# Patient Record
Sex: FEMALE | Race: WHITE | URBAN - METROPOLITAN AREA
[De-identification: names, ages, dates, MRNs, and addresses within clinical notes are randomized per-mention and may not be internally consistent; named-entity substitution may affect disease eponyms.]

---

## 2017-01-12 ENCOUNTER — TELEPHONE (OUTPATIENT)
Dept: TRANSPLANT | Facility: CLINIC | Age: 42
End: 2017-01-12

## 2017-01-12 NOTE — TELEPHONE ENCOUNTER
Rowena Melnedez,  received the labs from [facility] on Rosibel Willingham   1975 MR # [id number] I sent these results to epic scanning. Rosibel is active on the kidney list. Are these labs ok?  Anything more need to be done, please let me know. Fatoumata

## 2017-01-12 NOTE — TELEPHONE ENCOUNTER
talked with [facility] copy service staff, they gave new fax number and cover/DELMAR went through successful tone at 8:02 this am for records on Rosibel Fernando please look for records from [facility] by 10 am today 1/12/2017

## 2017-01-13 ENCOUNTER — TELEPHONE (OUTPATIENT)
Dept: TRANSPLANT | Facility: CLINIC | Age: 42
End: 2017-01-13

## 2017-01-13 NOTE — TELEPHONE ENCOUNTER
"Labs from [facility] Arrived, faxed to CAMILLA Banegas and sent to [facility].  Nora to review  ESR,  CRP, Vasculitis panel,  RPR, Myeloperoxidase Antibody IgG, Proteinase 3 antibody IgG,  Beta 2 glycoprotein 1 Antibody IgM,  Beta 2 glycoprotein 1 Antibody IgG,  Rheumatoid factor,  LUPE antibody panel,  PRABHAKAR (direct antiglobulin test) to see if anything more needs to be done.  I called Rosibel and informed her that she is ACTIVE on Wait List.  If she has questions she can call Rec[facility] Wait List team in the future. She understands.      Rosibel commented that her donors have said it is a 'slow process and haven't heard from donor team.\"  I recommend she ask her donors to call, Rosibel has number and she will tell them.  I will send Rosibel's donor comment to donor team.  Zunilda Borrego.  savanna Sheridan Pt active on list, Nora may have recommendations for outside labs.    "

## 2017-01-19 ENCOUNTER — RESULTS ONLY (OUTPATIENT)
Dept: OTHER | Facility: CLINIC | Age: 42
End: 2017-01-19

## 2017-01-19 ENCOUNTER — DOCUMENTATION ONLY (OUTPATIENT)
Dept: NEPHROLOGY | Facility: CLINIC | Age: 42
End: 2017-01-19

## 2017-01-19 DIAGNOSIS — Z99.2 END STAGE RENAL DISEASE ON DIALYSIS (H): ICD-10-CM

## 2017-01-19 DIAGNOSIS — N18.6 END STAGE RENAL DISEASE ON DIALYSIS (H): ICD-10-CM

## 2017-01-19 PROCEDURE — 86832 HLA CLASS I HIGH DEFIN QUAL: CPT | Performed by: TRANSPLANT SURGERY

## 2017-01-19 PROCEDURE — 86833 HLA CLASS II HIGH DEFIN QUAL: CPT | Performed by: TRANSPLANT SURGERY

## 2017-01-19 NOTE — PROGRESS NOTES
The following outside labs were obtained and reviewed by myself, along with Dr. Valente, in response to a positive dsDNA result at time of eval : LUPE, PRABHAKAR, RPR, lupus panel, beta 2 glycoprotein (IgG, IgM), RF, ESR,CRP, MPO, PR3, and vasculitis panel. All results were negative, except RNP, which had previously been positive. None of the above should preclude her from active listing on the kidney transplant list.

## 2017-01-24 LAB — PRA SINGLE ANTIGEN IGG ANTIBODY: NORMAL

## 2017-02-01 LAB
SA1 CELL: NORMAL
SA1 COMMENTS: NORMAL
SA1 HI RISK ABY: NORMAL
SA1 MOD RISK ABY: NORMAL
SA1 TEST METHOD: NORMAL
SA2 CELL: NORMAL
SA2 COMMENTS: NORMAL
SA2 HI RISK ABY UA: NORMAL
SA2 MOD RISK ABY: NORMAL
SA2 TEST METHOD: NORMAL

## 2017-03-08 ENCOUNTER — RESULTS ONLY (OUTPATIENT)
Dept: OTHER | Facility: CLINIC | Age: 42
End: 2017-03-08

## 2017-03-13 LAB — CROSSMATCH RESULT: NORMAL

## 2017-04-20 DIAGNOSIS — Z99.2 END STAGE RENAL DISEASE ON DIALYSIS (H): ICD-10-CM

## 2017-04-20 DIAGNOSIS — N18.6 END STAGE RENAL DISEASE ON DIALYSIS (H): ICD-10-CM

## 2017-04-21 ENCOUNTER — DOCUMENTATION ONLY (OUTPATIENT)
Dept: TRANSPLANT | Facility: CLINIC | Age: 42
End: 2017-04-21

## 2017-04-21 ENCOUNTER — ORGAN (OUTPATIENT)
Dept: TRANSPLANT | Facility: CLINIC | Age: 42
End: 2017-04-21

## 2017-04-21 DIAGNOSIS — Z76.82 AWAITING ORGAN TRANSPLANT: Primary | ICD-10-CM

## 2017-04-21 NOTE — Clinical Note
Final XM negative. Kidney arriving Sunday morning and will be transplanted in recip upon final visualization.

## 2017-04-21 NOTE — PROGRESS NOTES
PATHOLOGY HLA CROSSMATCH CONSULTATION: DONOR/RECIPIENT  VIRTUAL CROSSMATCH - Kidney  Consultation Date: 2017  Consultation Requested by: Dr. Mani Whiteside  Regarding: Compatibility of  donor organ UNOS #[id number] from OPO/Hospital: [facility]  with Rosibel Willingham      Findings: Regarding a virtual crossmatch between Rosibel Willingham and  donor listed above (match ID [id number]):  The most recent (17) and two (2) additional patient serum/sera  were analyzed.  The patient has no antibodies listed with HLA specificity against the donor organ. This candidate has no HLA mismatches in the graft rejection vector with the kidney donor; therefore, there are no donor-specific antibodies by definition.     Record Review Indicates: I personally reviewed the most recent serum, the historic peak sera, and all other sera with solid-phase HLA Single Antigen test results:  The patient has no HLA antibodies against the donor organ.     The results of this virtual XM are:   -most recent serum: compatible   -peak #1: compatible    Disclaimer: Clinical judgement must take into account other factors, such as non-HLA antibodies not detected in the assay. The VXM gives probabilities only.  The probability does not account for the potential for auto-antibodies that may be present in the patient's serum.  These autoantibodies may render the physical crossmatch falsely positive, and would be detected by an autologous crossmatch.  When possible, confirm findings with prospective allogeneic and autologous flow crossmatches before going to transplant as clinically indicated.     Koby Holman, PhD,[facility]  Medical Director, Immunology/Histocompatibility Laboratory  Pager [phone]

## 2017-04-22 ENCOUNTER — RESULTS ONLY (OUTPATIENT)
Dept: OTHER | Facility: CLINIC | Age: 42
End: 2017-04-22

## 2017-04-22 ENCOUNTER — APPOINTMENT (OUTPATIENT)
Dept: GENERAL RADIOLOGY | Facility: CLINIC | Age: 42
DRG: 652 | End: 2017-04-22
Attending: SURGERY
Payer: COMMERCIAL

## 2017-04-22 ENCOUNTER — HOSPITAL ENCOUNTER (INPATIENT)
Facility: CLINIC | Age: 42
LOS: 4 days | Discharge: HOME HEALTH | DRG: 652 | End: 2017-04-27
Attending: SURGERY | Admitting: SURGERY
Payer: COMMERCIAL

## 2017-04-22 ENCOUNTER — ANESTHESIA EVENT (OUTPATIENT)
Dept: SURGERY | Facility: CLINIC | Age: 42
DRG: 652 | End: 2017-04-22
Payer: COMMERCIAL

## 2017-04-22 DIAGNOSIS — Z99.2 ESRD (END STAGE RENAL DISEASE) ON DIALYSIS (H): ICD-10-CM

## 2017-04-22 DIAGNOSIS — N18.6 END STAGE RENAL DISEASE (H): ICD-10-CM

## 2017-04-22 DIAGNOSIS — Z76.82 KIDNEY TRANSPLANT CANDIDATE: Primary | ICD-10-CM

## 2017-04-22 DIAGNOSIS — Z94.0 KIDNEY REPLACED BY TRANSPLANT: ICD-10-CM

## 2017-04-22 DIAGNOSIS — T86.19 OTHER COMPLICATION OF KIDNEY TRANSPLANT: ICD-10-CM

## 2017-04-22 DIAGNOSIS — D84.9 IMMUNOSUPPRESSION (H): ICD-10-CM

## 2017-04-22 DIAGNOSIS — N18.6 ESRD (END STAGE RENAL DISEASE) ON DIALYSIS (H): ICD-10-CM

## 2017-04-22 LAB
ABO + RH BLD: NORMAL
ABO + RH BLD: NORMAL
ALBUMIN SERPL-MCNC: 4.8 G/DL (ref 3.4–5)
ALBUMIN UR-MCNC: 100 MG/DL
ALP SERPL-CCNC: 56 U/L (ref 40–150)
ALT SERPL W P-5'-P-CCNC: 32 U/L (ref 0–50)
ANION GAP SERPL CALCULATED.3IONS-SCNC: 12 MMOL/L (ref 3–14)
APPEARANCE UR: CLEAR
APTT PPP: 29 SEC (ref 22–37)
AST SERPL W P-5'-P-CCNC: 16 U/L (ref 0–45)
BASOPHILS # BLD AUTO: 0 10E9/L (ref 0–0.2)
BASOPHILS NFR BLD AUTO: 0.5 %
BILIRUB SERPL-MCNC: 0.5 MG/DL (ref 0.2–1.3)
BILIRUB UR QL STRIP: NEGATIVE
BLD GP AB SCN SERPL QL: NORMAL
BLD PROD TYP BPU: NORMAL
BLOOD BANK CMNT PATIENT-IMP: NORMAL
BUN SERPL-MCNC: 61 MG/DL (ref 7–30)
CALCIUM SERPL-MCNC: 10.7 MG/DL (ref 8.5–10.1)
CHLORIDE SERPL-SCNC: 98 MMOL/L (ref 94–109)
CHOLEST SERPL-MCNC: 199 MG/DL
CO2 SERPL-SCNC: 27 MMOL/L (ref 20–32)
COLOR UR AUTO: ABNORMAL
CREAT SERPL-MCNC: 8.57 MG/DL (ref 0.52–1.04)
DIFFERENTIAL METHOD BLD: ABNORMAL
EOSINOPHIL # BLD AUTO: 0.2 10E9/L (ref 0–0.7)
EOSINOPHIL NFR BLD AUTO: 1.9 %
ERYTHROCYTE [DISTWIDTH] IN BLOOD BY AUTOMATED COUNT: 16.3 % (ref 10–15)
GFR SERPL CREATININE-BSD FRML MDRD: 5 ML/MIN/1.7M2
GLUCOSE BLDC GLUCOMTR-MCNC: 112 MG/DL (ref 70–99)
GLUCOSE SERPL-MCNC: 78 MG/DL (ref 70–99)
GLUCOSE UR STRIP-MCNC: 30 MG/DL
HBA1C MFR BLD: 4 % (ref 4.3–6)
HCG SERPL QL: NEGATIVE
HCT VFR BLD AUTO: 35.7 % (ref 35–47)
HDLC SERPL-MCNC: 81 MG/DL
HGB BLD-MCNC: 11.3 G/DL (ref 11.7–15.7)
HGB UR QL STRIP: NEGATIVE
IMM GRANULOCYTES # BLD: 0 10E9/L (ref 0–0.4)
IMM GRANULOCYTES NFR BLD: 0.1 %
INR PPP: 1.04 (ref 0.86–1.14)
KETONES UR STRIP-MCNC: NEGATIVE MG/DL
LDLC SERPL CALC-MCNC: 100 MG/DL
LEUKOCYTE ESTERASE UR QL STRIP: NEGATIVE
LYMPHOCYTES # BLD AUTO: 1.8 10E9/L (ref 0.8–5.3)
LYMPHOCYTES NFR BLD AUTO: 22.4 %
MCH RBC QN AUTO: 29 PG (ref 26.5–33)
MCHC RBC AUTO-ENTMCNC: 31.7 G/DL (ref 31.5–36.5)
MCV RBC AUTO: 92 FL (ref 78–100)
MONOCYTES # BLD AUTO: 0.5 10E9/L (ref 0–1.3)
MONOCYTES NFR BLD AUTO: 5.9 %
MUCOUS THREADS #/AREA URNS LPF: PRESENT /LPF
NEUTROPHILS # BLD AUTO: 5.5 10E9/L (ref 1.6–8.3)
NEUTROPHILS NFR BLD AUTO: 69.2 %
NITRATE UR QL: NEGATIVE
NONHDLC SERPL-MCNC: 118 MG/DL
NRBC # BLD AUTO: 0 10*3/UL
NRBC BLD AUTO-RTO: 0 /100
NUM BPU REQUESTED: 2
PH UR STRIP: 8 PH (ref 5–7)
PLATELET # BLD AUTO: 150 10E9/L (ref 150–450)
POTASSIUM SERPL-SCNC: 4.1 MMOL/L (ref 3.4–5.3)
PROT SERPL-MCNC: 8.6 G/DL (ref 6.8–8.8)
PROT UR-MCNC: 1.38 G/L
PROT/CREAT 24H UR: 5.96 G/G CR (ref 0–0.2)
RBC # BLD AUTO: 3.89 10E12/L (ref 3.8–5.2)
RBC #/AREA URNS AUTO: 1 /HPF (ref 0–2)
SODIUM SERPL-SCNC: 138 MMOL/L (ref 133–144)
SP GR UR STRIP: 1 (ref 1–1.03)
SPECIMEN EXP DATE BLD: NORMAL
SQUAMOUS #/AREA URNS AUTO: 1 /HPF (ref 0–1)
TRIGL SERPL-MCNC: 92 MG/DL
URN SPEC COLLECT METH UR: ABNORMAL
UROBILINOGEN UR STRIP-MCNC: NORMAL MG/DL (ref 0–2)
WBC # BLD AUTO: 8 10E9/L (ref 4–11)
WBC #/AREA URNS AUTO: <1 /HPF (ref 0–2)

## 2017-04-22 PROCEDURE — 36415 COLL VENOUS BLD VENIPUNCTURE: CPT | Performed by: STUDENT IN AN ORGANIZED HEALTH CARE EDUCATION/TRAINING PROGRAM

## 2017-04-22 PROCEDURE — 86833 HLA CLASS II HIGH DEFIN QUAL: CPT | Performed by: TRANSPLANT SURGERY

## 2017-04-22 PROCEDURE — 25000132 ZZH RX MED GY IP 250 OP 250 PS 637: Performed by: STUDENT IN AN ORGANIZED HEALTH CARE EDUCATION/TRAINING PROGRAM

## 2017-04-22 PROCEDURE — 86803 HEPATITIS C AB TEST: CPT | Performed by: STUDENT IN AN ORGANIZED HEALTH CARE EDUCATION/TRAINING PROGRAM

## 2017-04-22 PROCEDURE — 86825 HLA X-MATH NON-CYTOTOXIC: CPT | Performed by: TRANSPLANT SURGERY

## 2017-04-22 PROCEDURE — 86901 BLOOD TYPING SEROLOGIC RH(D): CPT | Performed by: STUDENT IN AN ORGANIZED HEALTH CARE EDUCATION/TRAINING PROGRAM

## 2017-04-22 PROCEDURE — 90937 HEMODIALYSIS REPEATED EVAL: CPT

## 2017-04-22 PROCEDURE — 86705 HEP B CORE ANTIBODY IGM: CPT | Performed by: STUDENT IN AN ORGANIZED HEALTH CARE EDUCATION/TRAINING PROGRAM

## 2017-04-22 PROCEDURE — 80053 COMPREHEN METABOLIC PANEL: CPT | Performed by: STUDENT IN AN ORGANIZED HEALTH CARE EDUCATION/TRAINING PROGRAM

## 2017-04-22 PROCEDURE — 80061 LIPID PANEL: CPT | Performed by: STUDENT IN AN ORGANIZED HEALTH CARE EDUCATION/TRAINING PROGRAM

## 2017-04-22 PROCEDURE — 40000141 ZZH STATISTIC PERIPHERAL IV START W/O US GUIDANCE

## 2017-04-22 PROCEDURE — 81001 URINALYSIS AUTO W/SCOPE: CPT | Performed by: STUDENT IN AN ORGANIZED HEALTH CARE EDUCATION/TRAINING PROGRAM

## 2017-04-22 PROCEDURE — 86665 EPSTEIN-BARR CAPSID VCA: CPT | Performed by: STUDENT IN AN ORGANIZED HEALTH CARE EDUCATION/TRAINING PROGRAM

## 2017-04-22 PROCEDURE — 93005 ELECTROCARDIOGRAM TRACING: CPT

## 2017-04-22 PROCEDURE — 86900 BLOOD TYPING SEROLOGIC ABO: CPT | Performed by: STUDENT IN AN ORGANIZED HEALTH CARE EDUCATION/TRAINING PROGRAM

## 2017-04-22 PROCEDURE — 86850 RBC ANTIBODY SCREEN: CPT | Performed by: STUDENT IN AN ORGANIZED HEALTH CARE EDUCATION/TRAINING PROGRAM

## 2017-04-22 PROCEDURE — 86923 COMPATIBILITY TEST ELECTRIC: CPT | Performed by: STUDENT IN AN ORGANIZED HEALTH CARE EDUCATION/TRAINING PROGRAM

## 2017-04-22 PROCEDURE — 84156 ASSAY OF PROTEIN URINE: CPT | Performed by: STUDENT IN AN ORGANIZED HEALTH CARE EDUCATION/TRAINING PROGRAM

## 2017-04-22 PROCEDURE — 83036 HEMOGLOBIN GLYCOSYLATED A1C: CPT | Performed by: STUDENT IN AN ORGANIZED HEALTH CARE EDUCATION/TRAINING PROGRAM

## 2017-04-22 PROCEDURE — 85025 COMPLETE CBC W/AUTO DIFF WBC: CPT | Performed by: STUDENT IN AN ORGANIZED HEALTH CARE EDUCATION/TRAINING PROGRAM

## 2017-04-22 PROCEDURE — 86645 CMV ANTIBODY IGM: CPT | Performed by: STUDENT IN AN ORGANIZED HEALTH CARE EDUCATION/TRAINING PROGRAM

## 2017-04-22 PROCEDURE — 85730 THROMBOPLASTIN TIME PARTIAL: CPT | Performed by: STUDENT IN AN ORGANIZED HEALTH CARE EDUCATION/TRAINING PROGRAM

## 2017-04-22 PROCEDURE — 84703 CHORIONIC GONADOTROPIN ASSAY: CPT | Performed by: STUDENT IN AN ORGANIZED HEALTH CARE EDUCATION/TRAINING PROGRAM

## 2017-04-22 PROCEDURE — 74000 XR ABDOMEN PORT F1 VW: CPT

## 2017-04-22 PROCEDURE — 87340 HEPATITIS B SURFACE AG IA: CPT | Performed by: STUDENT IN AN ORGANIZED HEALTH CARE EDUCATION/TRAINING PROGRAM

## 2017-04-22 PROCEDURE — 86832 HLA CLASS I HIGH DEFIN QUAL: CPT | Performed by: TRANSPLANT SURGERY

## 2017-04-22 PROCEDURE — 71020 XR CHEST 2 VW: CPT

## 2017-04-22 PROCEDURE — 86644 CMV ANTIBODY: CPT | Performed by: STUDENT IN AN ORGANIZED HEALTH CARE EDUCATION/TRAINING PROGRAM

## 2017-04-22 PROCEDURE — 93010 ELECTROCARDIOGRAM REPORT: CPT | Performed by: INTERNAL MEDICINE

## 2017-04-22 PROCEDURE — 25000128 H RX IP 250 OP 636

## 2017-04-22 PROCEDURE — 87389 HIV-1 AG W/HIV-1&-2 AB AG IA: CPT | Performed by: STUDENT IN AN ORGANIZED HEALTH CARE EDUCATION/TRAINING PROGRAM

## 2017-04-22 PROCEDURE — 85610 PROTHROMBIN TIME: CPT | Performed by: STUDENT IN AN ORGANIZED HEALTH CARE EDUCATION/TRAINING PROGRAM

## 2017-04-22 PROCEDURE — 25000128 H RX IP 250 OP 636: Performed by: INTERNAL MEDICINE

## 2017-04-22 PROCEDURE — 82962 GLUCOSE BLOOD TEST: CPT

## 2017-04-22 RX ORDER — HYDROMORPHONE HYDROCHLORIDE 1 MG/ML
INJECTION, SOLUTION INTRAMUSCULAR; INTRAVENOUS; SUBCUTANEOUS
Status: COMPLETED
Start: 2017-04-22 | End: 2017-04-22

## 2017-04-22 RX ORDER — HYDROMORPHONE HCL/0.9% NACL/PF 0.2MG/0.2
0.2 SYRINGE (ML) INTRAVENOUS ONCE
Status: COMPLETED | OUTPATIENT
Start: 2017-04-22 | End: 2017-04-22

## 2017-04-22 RX ORDER — METOPROLOL SUCCINATE 25 MG/1
25 TABLET, EXTENDED RELEASE ORAL DAILY
Status: DISCONTINUED | OUTPATIENT
Start: 2017-04-22 | End: 2017-04-23

## 2017-04-22 RX ORDER — HYDROMORPHONE HCL/0.9% NACL/PF 0.2MG/0.2
0.2 SYRINGE (ML) INTRAVENOUS
Status: DISCONTINUED | OUTPATIENT
Start: 2017-04-22 | End: 2017-04-24 | Stop reason: DRUGHIGH

## 2017-04-22 RX ADMIN — SODIUM CHLORIDE 250 ML: 9 INJECTION, SOLUTION INTRAVENOUS at 22:07

## 2017-04-22 RX ADMIN — HYDROMORPHONE HYDROCHLORIDE 0.2 MG: 10 INJECTION, SOLUTION INTRAMUSCULAR; INTRAVENOUS; SUBCUTANEOUS at 23:58

## 2017-04-22 RX ADMIN — Medication 0.2 MG: at 23:58

## 2017-04-22 RX ADMIN — METOPROLOL SUCCINATE 25 MG: 25 TABLET, FILM COATED, EXTENDED RELEASE ORAL at 17:11

## 2017-04-22 RX ADMIN — SODIUM CHLORIDE 1000 ML: 9 INJECTION, SOLUTION INTRAVENOUS at 22:06

## 2017-04-22 NOTE — CONSULTS
Nephrology Initial Consult  April 22, 2017      Rosibel Willingham MRN:[id number] YOB: 1975  Date of Admission:4/22/2017  Primary care provider: No Ref-Primary, Physician  Requesting physician: Leanne Montelongo MD    ASSESSMENT AND RECOMMENDATIONS:   1. ESKD secondary unclear etiology - patient last dialyzed 2 days ago, but reportedly has good urine output and less than a kg above her dry weight.  Normal serum potassium.  No acute indications for dialysis and feel she does not require dialysis prior to planned kidney transplant.  2. HTN - okay control.  Would recommend holding amlodipine and continuing on metoprolol for now.  3. Anemia in chronic renal disease - near normal Hgb.  Will check iron studies.  4. Secondary renal hyperparathyroidism - will check PTH and vitamin D levels.  5. Hypercalcemia - will check PTH and vitamin D levels.    Recommendations were communicated to primary team via this note.    Sid Blankenship MD      REASON FOR CONSULT:  ESKD, planned kidney transplant    HISTORY OF PRESENT ILLNESS:  Rosibel Willingham is a 41 year old with ESKD who presents for possible kidney transplant.  Patient grew up in the [facility], but her  is from [place] and they decided to move down there just over a year ago.  Soon after moving to [place], patient presented to a local hospital in 6/2016 with hypertensive emergency, kidney failure with a creatinine of 8.3 mg/dl, and anemia requiring blood transfusion.  Abdominal US showed increased echogenicity of both kidneys and they were smaller just under 9 cm each. YOLANDE and RNP were positive.  Complements were noted to be normal.  A kidney biopsy was done that showed global glomerulosclerosis and severe interstitial fibrosis and tubular atrophy. She was initiated on hemodialysis and has continued on it since.  Of note, for about a year prior to starting dialysis, patient had been developing worsening headaches, for which she was  taking Excedrin 4-5 times per week for 1-2 months, leg cramps, and blood in the stools, all of which have since resolved.  She presently dialyzes via a left upper extremity AV fistula on THS with last dialysis on Thursday.  Her dry weight is ~ 52.0 kg and she continues to have good urine output.    She reports feeling really good with minimal medical complaints at this time.  No fever, sweats or chills.  No nausea, vomiting or diarrhea.  No chest pain or shortness of breath.  No leg swelling.    PAST MEDICAL HISTORY:  Reviewed with patient on 04/22/2017.  Past Medical History:   Diagnosis Date     Anemia of chronic kidney failure      End stage kidney disease (H)      Hypertension      Secondary hyperparathyroidism (H)        Past Surgical History:   Procedure Laterality Date     APPENDECTOMY       CREATE FISTULA ARTERIOVENOUS UPPER EXTREMITY          MEDICATIONS:  PTA Meds  Prior to Admission medications    Medication Sig Last Dose Taking? Auth Provider   amLODIPine (NORVASC) 10 MG tablet Take 10 mg by mouth daily 4/21/2017 at 2000 Yes Reported, Patient   metoprolol (TOPROL-XL) 25 MG 24 hr tablet Take 25 mg by mouth daily 4/21/2017 at 2000 Yes Reported, Patient      Current Meds    methylPREDNISolone  500 mg Intravenous Once     anti-thymocyte globulin  2 mg/kg Intravenous Once     mycophenolate  1,000 mg Intravenous Once     cefuroxime (ZINACEF) IV  1.5 g Intravenous Once     Infusion Meds       ALLERGIES:    Allergies   Allergen Reactions     Erythromycin Hives       REVIEW OF SYSTEMS:  A 10 point review of systems was negative except as noted above.    SOCIAL HISTORY:   Social History     Social History     Marital status:      Spouse name: N/A     Number of children: N/A     Years of education: N/A     Occupational History     Not on file.     Social History Main Topics     Smoking status: Former Smoker     Packs/day: 0.50     Years: 2.00     Types: Cigarettes     Quit date: 9/26/1998     Smokeless  "tobacco: Never Used     Alcohol use 1.2 oz/week     2 Standard drinks or equivalent per week     Drug use: No     Sexual activity: Not on file     Other Topics Concern     Not on file     Social History Narrative     Reviewed with patient.    FAMILY MEDICAL HISTORY:   Family History   Problem Relation Age of Onset     Family History Negative       Reviewed with patient.    PHYSICAL EXAM:   Temp  Av.8  F (36.6  C)  Min: 97.8  F (36.6  C)  Max: 97.8  F (36.6  C)      Pulse  Av  Min: 86  Max: 100 Resp  Av  Min: 16  Max: 16       BP (!) 144/91  Pulse 86  Temp 97.8  F (36.6  C) (Oral)  Resp 16  Ht 1.575 m (5' 2\")  Wt 52.9 kg (116 lb 10 oz)  BMI 21.33 kg/m2   Date 17 0700 - 17 0659   Shift 0505-8105 5087-1318 1411-5683 24 Hour Total   I  N  T  A  K  E   P.O. 240   240    Shift Total  (mL/kg) 240  (4.54)   240  (4.54)   O  U  T  P  U  T   Shift Total  (mL/kg)       Weight (kg) 52.9 52.9 52.9 52.9        Admit Weight: 52.9 kg (116 lb 10 oz)     GENERAL APPEARANCE: alert, NAD  EYES: no scleral icterus, pupils equal  HENT: NC/AT, mouth without ulcers or lesions  Lymphatics: no cervical or supraclavicular LAD  Pulmonary: lungs clear to auscultation with equal breath sounds bilaterally  CV: regular rhythm, normal rate   - none to trace LE edema bilaterally  GI: soft, nontender, normal bowel sounds  MS: no evidence of inflammation in joints, no muscle tenderness  : no Huang  SKIN: no rash, warm, dry  NEURO: mentation intact and speech normal  ACCESS: left upper extremity AV fistula with good thrill    LABS:   CMP  Recent Labs  Lab 17  1230      POTASSIUM 4.1   CHLORIDE 98   CO2 27   ANIONGAP 12   GLC 78   BUN 61*   CR 8.57*   GFRESTIMATED 5*   GFRESTBLACK 6*   ARLINE 10.7*   PROTTOTAL 8.6   ALBUMIN 4.8   BILITOTAL 0.5   ALKPHOS 56   AST 16   ALT 32     CBC  Recent Labs  Lab 17  1230   HGB 11.3*   WBC 8.0   RBC 3.89   HCT 35.7   MCV 92   MCH 29.0   MCHC 31.7   RDW 16.3*    "     INR  Recent Labs  Lab 04/22/17  1230   INR 1.04   PTT 29     ABGNo lab results found in last 7 days.   URINE STUDIES  Recent Labs   Lab Test  04/22/17   1300  09/26/16   1539   COLOR  Light Yellow  Straw   APPEARANCE  Clear  Clear   URINEGLC  30*  50*   URINEBILI  Negative  Negative   URINEKETONE  Negative  Negative   SG  1.005  1.004   UBLD  Negative  Small*   URINEPH  8.0*  9.0*   PROTEIN  100*  30*   NITRITE  Negative  Negative   LEUKEST  Negative  Negative   RBCU  1  0   WBCU  <1  <1     No lab results found.  PTH  No lab results found.  IRON STUDIES  No lab results found.    Sid Blankenship MD

## 2017-04-22 NOTE — LETTER
Transition Communication Hand-off for Care Transitions to Next Level of Care Provider    Name: Rosibel Willingham  MRN #: [id number]  Primary Care Provider: Physician No Ref-Primary     Primary Clinic: No address on file     Reason for Hospitalization:  Kidney replaced by transplant [Z94.0]  Admit Date/Time: 4/22/2017 11:08 AM  Discharge Date: 4.27.17  Payor Source: Payor: [facility] / Plan: [employer] TRANSPLANT / Product Type: Indemnity /              Reason for Communication Hand-off Referral: Other see dx    Discharge Plan:       Concern for non-adherence with plan of care:   Y/N N  Discharge Needs Assessment:  Needs       Most Recent Value    Anticipated Changes Related to Illness none    Other Resources -- [[facility] [phone]]    Home Care [facility] [phone], Fax: [phone]            Follow-up specialty is recommended: Yes    Follow-up plan:  Future Appointments  Date Time Provider Department Center   4/28/2017 7:00 AM [facility] SPE[facility]   4/28/2017 8:00 AM Benjamin Beltran MD [facility]   4/29/2017 7:00 AM [facility]   4/30/2017 7:00 AM [facility]   5/15/2017 2:00 PM Leanne Montelongo MD [facility]   5/22/2017 1:00 PM Benjamin Beltran MD [facility]   7/24/2017 1:10 PM Benjamin Beltran MD [facility]       Any outstanding tests or procedures:        Referrals     Future Labs/Procedures    Home care nursing referral     Comments:    ___________________________________________________  [facility]  Phone: [phone]  Fax: [phone]  ___________________________________________________  ++++pt will stay at [facility] Hot+++++  [address] [place] [address]  Room__________  [place] [address]  Hotel Phone: [phone]  Rosibel Cell: [phone]  Ian Cell: [phone]        RN skilled nursing visit, to begin after [facility] clinic ([phone]) appointments are completed--approx start date 5/3 or 5/5     RN to  assess vital signs and weight, respiratory and cardiac status, patients ability to take and record daily blood pressure, temp and weight, pain level and activity tolerance, incision for signs/symptoms of infection, hydration, nutrition and bowel status and home safety.  RN to teach medication management.    RN to provide morning lab draws, Q M-W-F and report results to Outpatient Care Coordinator: Chinyere Chacha  Ph: [phone]  Fax: [phone]    Pt will have Outpatient Hemodialysis Q M-W-F @ 12:30pm (they cannot draw immunosuppression labs)    Your provider has ordered home care nursing services. If you have not been contacted within 2 days of your discharge please call the inpatient department phone number at [phone] .            [facility] Recommendations:      Swapna Medina    AVS/Discharge Summary is the source of truth; this is a helpful guide for improved communication of patient story

## 2017-04-22 NOTE — IP AVS SNAPSHOT
[address] [address]    Phone:  [phone]                                       After Visit Summary   4/22/2017    Rosibel Willingham    MRN: [id number]           After Visit Summary Signature Page     I have received my discharge instructions, and my questions have been answered. I have discussed any challenges I see with this plan with the nurse or doctor.    ..........................................................................................................................................  Patient/Patient Representative Signature      ..........................................................................................................................................  Patient Representative Print Name and Relationship to Patient    ..................................................               ................................................  Date                                            Time    ..........................................................................................................................................  Reviewed by Signature/Title    ...................................................              ..............................................  Date                                                            Time

## 2017-04-22 NOTE — PLAN OF CARE
Problem: Individualization  Goal: Patient Preferences  Outcome: No Change  Focus: Admission  D/I: Pt admitted at 1100 for pre op kidney transplant. Pt ambulated to unit with . Alert and oriented. Hypertensive, pt last took pm meds last evening. OVSS. Labs drawn, urine spec sent, EKG and CXR done. Nephrologist consulted since pt is due to dialyze today and OR time has been pushed back to late evening hours. Waiting to hear if she will need HD today. Left UE fistula + bruit, + thrill. PIV placed in right AC, saline locked. Denies pain, did have lunch at 1300, otherwise has been NPO. Scrub X 1 done.  Admission profile started, still needs abuse questions asked but  has been in room.   A/P: Plan for OR tonight. One more shower scrub needed.

## 2017-04-23 ENCOUNTER — ANESTHESIA (OUTPATIENT)
Dept: SURGERY | Facility: CLINIC | Age: 42
DRG: 652 | End: 2017-04-23
Payer: COMMERCIAL

## 2017-04-23 ENCOUNTER — DOCUMENTATION ONLY (OUTPATIENT)
Dept: TRANSPLANT | Facility: CLINIC | Age: 42
End: 2017-04-23

## 2017-04-23 ENCOUNTER — APPOINTMENT (OUTPATIENT)
Dept: GENERAL RADIOLOGY | Facility: CLINIC | Age: 42
DRG: 652 | End: 2017-04-23
Attending: TRANSPLANT SURGERY
Payer: COMMERCIAL

## 2017-04-23 PROBLEM — Z94.0 DECEASED-DONOR KIDNEY TRANSPLANT: Status: ACTIVE | Noted: 2017-04-23

## 2017-04-23 LAB
ANION GAP SERPL CALCULATED.3IONS-SCNC: 10 MMOL/L (ref 3–14)
ANION GAP SERPL CALCULATED.3IONS-SCNC: 10 MMOL/L (ref 3–14)
ANION GAP SERPL CALCULATED.3IONS-SCNC: 8 MMOL/L (ref 3–14)
BLD PROD TYP BPU: NORMAL
BLD PROD TYP BPU: NORMAL
BLD UNIT ID BPU: 0
BLD UNIT ID BPU: 0
BLOOD PRODUCT CODE: NORMAL
BLOOD PRODUCT CODE: NORMAL
BPU ID: NORMAL
BPU ID: NORMAL
BUN SERPL-MCNC: 38 MG/DL (ref 7–30)
BUN SERPL-MCNC: 42 MG/DL (ref 7–30)
BUN SERPL-MCNC: 43 MG/DL (ref 7–30)
CALCIUM SERPL-MCNC: 7.9 MG/DL (ref 8.5–10.1)
CALCIUM SERPL-MCNC: 8.3 MG/DL (ref 8.5–10.1)
CALCIUM SERPL-MCNC: 8.5 MG/DL (ref 8.5–10.1)
CHLORIDE SERPL-SCNC: 105 MMOL/L (ref 94–109)
CHLORIDE SERPL-SCNC: 105 MMOL/L (ref 94–109)
CHLORIDE SERPL-SCNC: 106 MMOL/L (ref 94–109)
CO2 SERPL-SCNC: 22 MMOL/L (ref 20–32)
CO2 SERPL-SCNC: 23 MMOL/L (ref 20–32)
CO2 SERPL-SCNC: 26 MMOL/L (ref 20–32)
CREAT SERPL-MCNC: 5.02 MG/DL (ref 0.52–1.04)
CREAT SERPL-MCNC: 6.48 MG/DL (ref 0.52–1.04)
CREAT SERPL-MCNC: 6.52 MG/DL (ref 0.52–1.04)
DEPRECATED CALCIDIOL+CALCIFEROL SERPL-MC: 29 UG/L (ref 20–75)
ERYTHROCYTE [DISTWIDTH] IN BLOOD BY AUTOMATED COUNT: 15.9 % (ref 10–15)
ERYTHROCYTE [DISTWIDTH] IN BLOOD BY AUTOMATED COUNT: 16 % (ref 10–15)
FERRITIN SERPL-MCNC: 71 NG/ML (ref 12–150)
GFR SERPL CREATININE-BSD FRML MDRD: 7 ML/MIN/1.7M2
GFR SERPL CREATININE-BSD FRML MDRD: 7 ML/MIN/1.7M2
GFR SERPL CREATININE-BSD FRML MDRD: 9 ML/MIN/1.7M2
GLUCOSE BLDC GLUCOMTR-MCNC: 197 MG/DL (ref 70–99)
GLUCOSE BLDC GLUCOMTR-MCNC: 205 MG/DL (ref 70–99)
GLUCOSE SERPL-MCNC: 111 MG/DL (ref 70–99)
GLUCOSE SERPL-MCNC: 93 MG/DL (ref 70–99)
GLUCOSE SERPL-MCNC: 95 MG/DL (ref 70–99)
HCT VFR BLD AUTO: 26.8 % (ref 35–47)
HCT VFR BLD AUTO: 28.1 % (ref 35–47)
HGB BLD-MCNC: 7.8 G/DL (ref 11.7–15.7)
HGB BLD-MCNC: 8.4 G/DL (ref 11.7–15.7)
HGB BLD-MCNC: 8.4 G/DL (ref 11.7–15.7)
HGB BLD-MCNC: 9.2 G/DL (ref 11.7–15.7)
IRON SATN MFR SERPL: 25 % (ref 15–46)
IRON SERPL-MCNC: 47 UG/DL (ref 35–180)
MAGNESIUM SERPL-MCNC: 1.7 MG/DL (ref 1.6–2.3)
MAGNESIUM SERPL-MCNC: 1.9 MG/DL (ref 1.6–2.3)
MCH RBC QN AUTO: 28.8 PG (ref 26.5–33)
MCH RBC QN AUTO: 29.6 PG (ref 26.5–33)
MCHC RBC AUTO-ENTMCNC: 31.3 G/DL (ref 31.5–36.5)
MCHC RBC AUTO-ENTMCNC: 32.7 G/DL (ref 31.5–36.5)
MCV RBC AUTO: 90 FL (ref 78–100)
MCV RBC AUTO: 92 FL (ref 78–100)
PHOSPHATE SERPL-MCNC: 2.5 MG/DL (ref 2.5–4.5)
PHOSPHATE SERPL-MCNC: 4 MG/DL (ref 2.5–4.5)
PLATELET # BLD AUTO: 103 10E9/L (ref 150–450)
PLATELET # BLD AUTO: 125 10E9/L (ref 150–450)
POTASSIUM SERPL-SCNC: 3.7 MMOL/L (ref 3.4–5.3)
POTASSIUM SERPL-SCNC: 4.6 MMOL/L (ref 3.4–5.3)
POTASSIUM SERPL-SCNC: 5.4 MMOL/L (ref 3.4–5.3)
POTASSIUM SERPL-SCNC: 6 MMOL/L (ref 3.4–5.3)
POTASSIUM SERPL-SCNC: 6.5 MMOL/L (ref 3.4–5.3)
PTH-INTACT SERPL-MCNC: 149 PG/ML (ref 12–72)
RBC # BLD AUTO: 2.92 10E12/L (ref 3.8–5.2)
RBC # BLD AUTO: 3.11 10E12/L (ref 3.8–5.2)
SODIUM SERPL-SCNC: 137 MMOL/L (ref 133–144)
SODIUM SERPL-SCNC: 139 MMOL/L (ref 133–144)
SODIUM SERPL-SCNC: 139 MMOL/L (ref 133–144)
TIBC SERPL-MCNC: 192 UG/DL (ref 240–430)
TRANSFUSION STATUS PATIENT QL: NORMAL
WBC # BLD AUTO: 10.8 10E9/L (ref 4–11)
WBC # BLD AUTO: 3.7 10E9/L (ref 4–11)

## 2017-04-23 PROCEDURE — 25000125 ZZHC RX 250: Performed by: TRANSPLANT SURGERY

## 2017-04-23 PROCEDURE — 36000062 ZZH SURGERY LEVEL 4 1ST 30 MIN - UMMC: Performed by: SURGERY

## 2017-04-23 PROCEDURE — 36000064 ZZH SURGERY LEVEL 4 EA 15 ADDTL MIN - UMMC: Performed by: SURGERY

## 2017-04-23 PROCEDURE — 00000146 ZZHCL STATISTIC GLUCOSE BY METER IP

## 2017-04-23 PROCEDURE — 25000132 ZZH RX MED GY IP 250 OP 250 PS 637: Performed by: SURGERY

## 2017-04-23 PROCEDURE — 80048 BASIC METABOLIC PNL TOTAL CA: CPT | Performed by: STUDENT IN AN ORGANIZED HEALTH CARE EDUCATION/TRAINING PROGRAM

## 2017-04-23 PROCEDURE — 85027 COMPLETE CBC AUTOMATED: CPT | Performed by: TRANSPLANT SURGERY

## 2017-04-23 PROCEDURE — 82728 ASSAY OF FERRITIN: CPT | Performed by: INTERNAL MEDICINE

## 2017-04-23 PROCEDURE — 84100 ASSAY OF PHOSPHORUS: CPT | Performed by: STUDENT IN AN ORGANIZED HEALTH CARE EDUCATION/TRAINING PROGRAM

## 2017-04-23 PROCEDURE — 25000128 H RX IP 250 OP 636: Performed by: STUDENT IN AN ORGANIZED HEALTH CARE EDUCATION/TRAINING PROGRAM

## 2017-04-23 PROCEDURE — 36415 COLL VENOUS BLD VENIPUNCTURE: CPT | Performed by: INTERNAL MEDICINE

## 2017-04-23 PROCEDURE — 83540 ASSAY OF IRON: CPT | Performed by: INTERNAL MEDICINE

## 2017-04-23 PROCEDURE — 25000131 ZZH RX MED GY IP 250 OP 636 PS 637: Performed by: SURGERY

## 2017-04-23 PROCEDURE — 93005 ELECTROCARDIOGRAM TRACING: CPT

## 2017-04-23 PROCEDURE — 90937 HEMODIALYSIS REPEATED EVAL: CPT

## 2017-04-23 PROCEDURE — 25000125 ZZHC RX 250

## 2017-04-23 PROCEDURE — 80048 BASIC METABOLIC PNL TOTAL CA: CPT | Performed by: INTERNAL MEDICINE

## 2017-04-23 PROCEDURE — 25000125 ZZHC RX 250: Performed by: SURGERY

## 2017-04-23 PROCEDURE — 82306 VITAMIN D 25 HYDROXY: CPT | Performed by: INTERNAL MEDICINE

## 2017-04-23 PROCEDURE — 80048 BASIC METABOLIC PNL TOTAL CA: CPT | Performed by: SURGERY

## 2017-04-23 PROCEDURE — 36415 COLL VENOUS BLD VENIPUNCTURE: CPT | Performed by: TRANSPLANT SURGERY

## 2017-04-23 PROCEDURE — 81200002 ZZH ACQUISITION KIDNEY CADAVER

## 2017-04-23 PROCEDURE — 85018 HEMOGLOBIN: CPT | Performed by: TRANSPLANT SURGERY

## 2017-04-23 PROCEDURE — 37000008 ZZH ANESTHESIA TECHNICAL FEE, 1ST 30 MIN: Performed by: SURGERY

## 2017-04-23 PROCEDURE — 25000128 H RX IP 250 OP 636

## 2017-04-23 PROCEDURE — 84100 ASSAY OF PHOSPHORUS: CPT | Performed by: TRANSPLANT SURGERY

## 2017-04-23 PROCEDURE — 25800025 ZZH RX 258: Performed by: SURGERY

## 2017-04-23 PROCEDURE — 27210794 ZZH OR GENERAL SUPPLY STERILE: Performed by: SURGERY

## 2017-04-23 PROCEDURE — 93010 ELECTROCARDIOGRAM REPORT: CPT | Performed by: INTERNAL MEDICINE

## 2017-04-23 PROCEDURE — 25000128 H RX IP 250 OP 636: Performed by: TRANSPLANT SURGERY

## 2017-04-23 PROCEDURE — 05PY33Z REMOVAL OF INFUSION DEVICE FROM UPPER VEIN, PERCUTANEOUS APPROACH: ICD-10-PCS | Performed by: ANESTHESIOLOGY

## 2017-04-23 PROCEDURE — 80048 BASIC METABOLIC PNL TOTAL CA: CPT | Performed by: TRANSPLANT SURGERY

## 2017-04-23 PROCEDURE — 40000196 ZZH STATISTIC RAPCV CVP MONITORING

## 2017-04-23 PROCEDURE — 83735 ASSAY OF MAGNESIUM: CPT | Performed by: STUDENT IN AN ORGANIZED HEALTH CARE EDUCATION/TRAINING PROGRAM

## 2017-04-23 PROCEDURE — 25000132 ZZH RX MED GY IP 250 OP 250 PS 637: Performed by: STUDENT IN AN ORGANIZED HEALTH CARE EDUCATION/TRAINING PROGRAM

## 2017-04-23 PROCEDURE — 37000009 ZZH ANESTHESIA TECHNICAL FEE, EACH ADDTL 15 MIN: Performed by: SURGERY

## 2017-04-23 PROCEDURE — 83735 ASSAY OF MAGNESIUM: CPT | Performed by: TRANSPLANT SURGERY

## 2017-04-23 PROCEDURE — 0TY00Z0 TRANSPLANTATION OF RIGHT KIDNEY, ALLOGENEIC, OPEN APPROACH: ICD-10-PCS | Performed by: SURGERY

## 2017-04-23 PROCEDURE — 25800025 ZZH RX 258

## 2017-04-23 PROCEDURE — 0T760DZ DILATION OF RIGHT URETER WITH INTRALUMINAL DEVICE, OPEN APPROACH: ICD-10-PCS | Performed by: SURGERY

## 2017-04-23 PROCEDURE — C2617 STENT, NON-COR, TEM W/O DEL: HCPCS | Performed by: SURGERY

## 2017-04-23 PROCEDURE — 25000132 ZZH RX MED GY IP 250 OP 250 PS 637: Performed by: TRANSPLANT SURGERY

## 2017-04-23 PROCEDURE — 36415 COLL VENOUS BLD VENIPUNCTURE: CPT | Performed by: STUDENT IN AN ORGANIZED HEALTH CARE EDUCATION/TRAINING PROGRAM

## 2017-04-23 PROCEDURE — 83970 ASSAY OF PARATHORMONE: CPT | Performed by: INTERNAL MEDICINE

## 2017-04-23 PROCEDURE — 40000170 ZZH STATISTIC PRE-PROCEDURE ASSESSMENT II: Performed by: SURGERY

## 2017-04-23 PROCEDURE — 5A1D60Z PERFORMANCE OF URINARY FILTRATION, MULTIPLE: ICD-10-PCS | Performed by: INTERNAL MEDICINE

## 2017-04-23 PROCEDURE — 40000985 XR CHEST PORT 1 VW

## 2017-04-23 PROCEDURE — 36592 COLLECT BLOOD FROM PICC: CPT | Performed by: TRANSPLANT SURGERY

## 2017-04-23 PROCEDURE — 84132 ASSAY OF SERUM POTASSIUM: CPT | Performed by: TRANSPLANT SURGERY

## 2017-04-23 PROCEDURE — 30243S1 TRANSFUSION OF NONAUTOLOGOUS GLOBULIN INTO CENTRAL VEIN, PERCUTANEOUS APPROACH: ICD-10-PCS | Performed by: SURGERY

## 2017-04-23 PROCEDURE — 25000128 H RX IP 250 OP 636: Performed by: INTERNAL MEDICINE

## 2017-04-23 PROCEDURE — 25000128 H RX IP 250 OP 636: Performed by: SURGERY

## 2017-04-23 PROCEDURE — 83550 IRON BINDING TEST: CPT | Performed by: INTERNAL MEDICINE

## 2017-04-23 PROCEDURE — 25000565 ZZH ISOFLURANE, EA 15 MIN: Performed by: SURGERY

## 2017-04-23 PROCEDURE — 71000016 ZZH RECOVERY PHASE 1 LEVEL 3 FIRST HR: Performed by: SURGERY

## 2017-04-23 PROCEDURE — 25000131 ZZH RX MED GY IP 250 OP 636 PS 637: Performed by: TRANSPLANT SURGERY

## 2017-04-23 PROCEDURE — 12000006 ZZH R&B IMCU INTERMEDIATE UMMC

## 2017-04-23 PROCEDURE — 85027 COMPLETE CBC AUTOMATED: CPT | Performed by: STUDENT IN AN ORGANIZED HEALTH CARE EDUCATION/TRAINING PROGRAM

## 2017-04-23 DEVICE — STENT URETERAL SOFT UNIVERSA 5.0FRX18CM US-518 G53145
Type: IMPLANTABLE DEVICE | Site: URETER | Status: NON-FUNCTIONAL
Removed: 2017-05-15

## 2017-04-23 RX ORDER — ONDANSETRON 2 MG/ML
4 INJECTION INTRAMUSCULAR; INTRAVENOUS EVERY 6 HOURS PRN
Status: DISCONTINUED | OUTPATIENT
Start: 2017-04-23 | End: 2017-04-27 | Stop reason: HOSPADM

## 2017-04-23 RX ORDER — NALOXONE HYDROCHLORIDE 0.4 MG/ML
.1-.4 INJECTION, SOLUTION INTRAMUSCULAR; INTRAVENOUS; SUBCUTANEOUS
Status: DISCONTINUED | OUTPATIENT
Start: 2017-04-23 | End: 2017-04-24

## 2017-04-23 RX ORDER — HEPARIN SODIUM 1000 [USP'U]/ML
INJECTION, SOLUTION INTRAVENOUS; SUBCUTANEOUS PRN
Status: DISCONTINUED | OUTPATIENT
Start: 2017-04-23 | End: 2017-04-23

## 2017-04-23 RX ORDER — FENTANYL CITRATE 50 UG/ML
INJECTION, SOLUTION INTRAMUSCULAR; INTRAVENOUS PRN
Status: DISCONTINUED | OUTPATIENT
Start: 2017-04-23 | End: 2017-04-23

## 2017-04-23 RX ORDER — SULFAMETHOXAZOLE AND TRIMETHOPRIM 400; 80 MG/1; MG/1
1 TABLET ORAL DAILY
Status: DISCONTINUED | OUTPATIENT
Start: 2017-04-23 | End: 2017-04-25

## 2017-04-23 RX ORDER — DEXTROSE MONOHYDRATE 25 G/50ML
25-50 INJECTION, SOLUTION INTRAVENOUS
Status: DISCONTINUED | OUTPATIENT
Start: 2017-04-23 | End: 2017-04-27 | Stop reason: HOSPADM

## 2017-04-23 RX ORDER — METOPROLOL TARTRATE 1 MG/ML
5 INJECTION, SOLUTION INTRAVENOUS EVERY 6 HOURS
Status: DISCONTINUED | OUTPATIENT
Start: 2017-04-24 | End: 2017-04-24

## 2017-04-23 RX ORDER — ONDANSETRON 4 MG/1
4 TABLET, ORALLY DISINTEGRATING ORAL EVERY 6 HOURS PRN
Status: DISCONTINUED | OUTPATIENT
Start: 2017-04-23 | End: 2017-04-27 | Stop reason: HOSPADM

## 2017-04-23 RX ORDER — ACETAMINOPHEN 500 MG
1000 TABLET ORAL EVERY 8 HOURS SCHEDULED
Status: DISCONTINUED | OUTPATIENT
Start: 2017-04-23 | End: 2017-04-27

## 2017-04-23 RX ORDER — GLYCOPYRROLATE 0.2 MG/ML
INJECTION, SOLUTION INTRAMUSCULAR; INTRAVENOUS PRN
Status: DISCONTINUED | OUTPATIENT
Start: 2017-04-23 | End: 2017-04-23

## 2017-04-23 RX ORDER — NICOTINE POLACRILEX 4 MG
15-30 LOZENGE BUCCAL
Status: DISCONTINUED | OUTPATIENT
Start: 2017-04-23 | End: 2017-04-27 | Stop reason: HOSPADM

## 2017-04-23 RX ORDER — NALOXONE HYDROCHLORIDE 0.4 MG/ML
.1-.4 INJECTION, SOLUTION INTRAMUSCULAR; INTRAVENOUS; SUBCUTANEOUS
Status: DISCONTINUED | OUTPATIENT
Start: 2017-04-23 | End: 2017-04-27 | Stop reason: HOSPADM

## 2017-04-23 RX ORDER — SODIUM CHLORIDE, SODIUM LACTATE, POTASSIUM CHLORIDE, CALCIUM CHLORIDE 600; 310; 30; 20 MG/100ML; MG/100ML; MG/100ML; MG/100ML
INJECTION, SOLUTION INTRAVENOUS CONTINUOUS PRN
Status: DISCONTINUED | OUTPATIENT
Start: 2017-04-23 | End: 2017-04-23

## 2017-04-23 RX ORDER — SODIUM CHLORIDE 9 MG/ML
INJECTION, SOLUTION INTRAVENOUS CONTINUOUS
Status: DISCONTINUED | OUTPATIENT
Start: 2017-04-23 | End: 2017-04-23 | Stop reason: HOSPADM

## 2017-04-23 RX ORDER — ONDANSETRON 4 MG/1
4 TABLET, ORALLY DISINTEGRATING ORAL EVERY 30 MIN PRN
Status: DISCONTINUED | OUTPATIENT
Start: 2017-04-23 | End: 2017-04-23 | Stop reason: HOSPADM

## 2017-04-23 RX ORDER — PROCHLORPERAZINE MALEATE 5 MG
5-10 TABLET ORAL EVERY 6 HOURS PRN
Status: DISCONTINUED | OUTPATIENT
Start: 2017-04-23 | End: 2017-04-27 | Stop reason: HOSPADM

## 2017-04-23 RX ORDER — MYCOPHENOLATE MOFETIL 250 MG/1
750 CAPSULE ORAL
Status: DISCONTINUED | OUTPATIENT
Start: 2017-04-23 | End: 2017-04-27 | Stop reason: HOSPADM

## 2017-04-23 RX ORDER — VALGANCICLOVIR 450 MG/1
450 TABLET, FILM COATED ORAL
Status: DISCONTINUED | OUTPATIENT
Start: 2017-04-24 | End: 2017-04-27 | Stop reason: HOSPADM

## 2017-04-23 RX ORDER — PROPOFOL 10 MG/ML
INJECTION, EMULSION INTRAVENOUS PRN
Status: DISCONTINUED | OUTPATIENT
Start: 2017-04-23 | End: 2017-04-23

## 2017-04-23 RX ORDER — AMOXICILLIN 250 MG
2 CAPSULE ORAL AT BEDTIME
Status: DISCONTINUED | OUTPATIENT
Start: 2017-04-23 | End: 2017-04-27 | Stop reason: HOSPADM

## 2017-04-23 RX ORDER — HYDROMORPHONE HYDROCHLORIDE 1 MG/ML
.3-.5 INJECTION, SOLUTION INTRAMUSCULAR; INTRAVENOUS; SUBCUTANEOUS EVERY 5 MIN PRN
Status: DISCONTINUED | OUTPATIENT
Start: 2017-04-23 | End: 2017-04-23 | Stop reason: HOSPADM

## 2017-04-23 RX ORDER — FENTANYL CITRATE 50 UG/ML
25-50 INJECTION, SOLUTION INTRAMUSCULAR; INTRAVENOUS
Status: DISCONTINUED | OUTPATIENT
Start: 2017-04-23 | End: 2017-04-23 | Stop reason: HOSPADM

## 2017-04-23 RX ORDER — MANNITOL 20 G/100ML
INJECTION, SOLUTION INTRAVENOUS PRN
Status: DISCONTINUED | OUTPATIENT
Start: 2017-04-23 | End: 2017-04-23

## 2017-04-23 RX ORDER — LIDOCAINE HYDROCHLORIDE 20 MG/ML
INJECTION, SOLUTION INFILTRATION; PERINEURAL PRN
Status: DISCONTINUED | OUTPATIENT
Start: 2017-04-23 | End: 2017-04-23

## 2017-04-23 RX ORDER — ONDANSETRON 2 MG/ML
INJECTION INTRAMUSCULAR; INTRAVENOUS PRN
Status: DISCONTINUED | OUTPATIENT
Start: 2017-04-23 | End: 2017-04-23

## 2017-04-23 RX ORDER — LABETALOL HYDROCHLORIDE 5 MG/ML
10 INJECTION, SOLUTION INTRAVENOUS
Status: DISCONTINUED | OUTPATIENT
Start: 2017-04-23 | End: 2017-04-23 | Stop reason: HOSPADM

## 2017-04-23 RX ORDER — BISACODYL 10 MG
10 SUPPOSITORY, RECTAL RECTAL 2 TIMES DAILY PRN
Status: DISCONTINUED | OUTPATIENT
Start: 2017-04-23 | End: 2017-04-27 | Stop reason: HOSPADM

## 2017-04-23 RX ORDER — DEXAMETHASONE SODIUM PHOSPHATE 4 MG/ML
INJECTION, SOLUTION INTRA-ARTICULAR; INTRALESIONAL; INTRAMUSCULAR; INTRAVENOUS; SOFT TISSUE PRN
Status: DISCONTINUED | OUTPATIENT
Start: 2017-04-23 | End: 2017-04-23

## 2017-04-23 RX ORDER — FUROSEMIDE 10 MG/ML
INJECTION INTRAMUSCULAR; INTRAVENOUS PRN
Status: DISCONTINUED | OUTPATIENT
Start: 2017-04-23 | End: 2017-04-23

## 2017-04-23 RX ORDER — SODIUM CHLORIDE, SODIUM LACTATE, POTASSIUM CHLORIDE, CALCIUM CHLORIDE 600; 310; 30; 20 MG/100ML; MG/100ML; MG/100ML; MG/100ML
INJECTION, SOLUTION INTRAVENOUS CONTINUOUS
Status: DISCONTINUED | OUTPATIENT
Start: 2017-04-23 | End: 2017-04-23 | Stop reason: HOSPADM

## 2017-04-23 RX ORDER — FUROSEMIDE 10 MG/ML
60 INJECTION INTRAMUSCULAR; INTRAVENOUS 3 TIMES DAILY
Status: DISCONTINUED | OUTPATIENT
Start: 2017-04-23 | End: 2017-04-24

## 2017-04-23 RX ORDER — SODIUM CHLORIDE 450 MG/100ML
INJECTION, SOLUTION INTRAVENOUS CONTINUOUS PRN
Status: DISCONTINUED | OUTPATIENT
Start: 2017-04-23 | End: 2017-04-24

## 2017-04-23 RX ORDER — PANTOPRAZOLE SODIUM 40 MG/1
40 TABLET, DELAYED RELEASE ORAL DAILY
Status: DISCONTINUED | OUTPATIENT
Start: 2017-04-23 | End: 2017-04-27 | Stop reason: HOSPADM

## 2017-04-23 RX ORDER — HEPARIN SODIUM 1000 [USP'U]/ML
INJECTION, SOLUTION INTRAVENOUS; SUBCUTANEOUS
Status: DISCONTINUED | OUTPATIENT
Start: 1840-12-31 | End: 2017-04-23 | Stop reason: HOSPADM

## 2017-04-23 RX ORDER — AMOXICILLIN 250 MG
1 CAPSULE ORAL 2 TIMES DAILY
Status: DISCONTINUED | OUTPATIENT
Start: 2017-04-23 | End: 2017-04-23

## 2017-04-23 RX ORDER — SODIUM CHLORIDE 9 MG/ML
1000 INJECTION, SOLUTION INTRAVENOUS CONTINUOUS PRN
Status: DISCONTINUED | OUTPATIENT
Start: 2017-04-23 | End: 2017-04-24

## 2017-04-23 RX ORDER — NEOSTIGMINE METHYLSULFATE 1 MG/ML
VIAL (ML) INJECTION PRN
Status: DISCONTINUED | OUTPATIENT
Start: 2017-04-23 | End: 2017-04-23

## 2017-04-23 RX ORDER — ONDANSETRON 2 MG/ML
4 INJECTION INTRAMUSCULAR; INTRAVENOUS EVERY 30 MIN PRN
Status: DISCONTINUED | OUTPATIENT
Start: 2017-04-23 | End: 2017-04-23 | Stop reason: HOSPADM

## 2017-04-23 RX ADMIN — MYCOPHENOLATE MOFETIL 750 MG: 250 CAPSULE ORAL at 18:10

## 2017-04-23 RX ADMIN — MIDAZOLAM HYDROCHLORIDE 2 MG: 1 INJECTION, SOLUTION INTRAMUSCULAR; INTRAVENOUS at 09:52

## 2017-04-23 RX ADMIN — CISATRACURIUM BESYLATE 4 MG: 2 INJECTION INTRAVENOUS at 12:00

## 2017-04-23 RX ADMIN — HYDROMORPHONE HYDROCHLORIDE: 10 INJECTION, SOLUTION INTRAMUSCULAR; INTRAVENOUS; SUBCUTANEOUS at 14:38

## 2017-04-23 RX ADMIN — BISACODYL 10 MG: 10 SUPPOSITORY RECTAL at 01:30

## 2017-04-23 RX ADMIN — SODIUM CHLORIDE, POTASSIUM CHLORIDE, SODIUM LACTATE AND CALCIUM CHLORIDE: 600; 310; 30; 20 INJECTION, SOLUTION INTRAVENOUS at 10:02

## 2017-04-23 RX ADMIN — ONDANSETRON 4 MG: 2 INJECTION INTRAMUSCULAR; INTRAVENOUS at 13:41

## 2017-04-23 RX ADMIN — SENNOSIDES AND DOCUSATE SODIUM 1 TABLET: 8.6; 5 TABLET ORAL at 01:31

## 2017-04-23 RX ADMIN — CISATRACURIUM BESYLATE 2 MG: 2 INJECTION INTRAVENOUS at 11:16

## 2017-04-23 RX ADMIN — SODIUM CHLORIDE, POTASSIUM CHLORIDE, SODIUM LACTATE AND CALCIUM CHLORIDE: 600; 310; 30; 20 INJECTION, SOLUTION INTRAVENOUS at 13:55

## 2017-04-23 RX ADMIN — LIDOCAINE HYDROCHLORIDE 50 MG: 20 INJECTION, SOLUTION INFILTRATION; PERINEURAL at 09:52

## 2017-04-23 RX ADMIN — MANNITOL 25 G: 20 INJECTION, SOLUTION INTRAVENOUS at 12:21

## 2017-04-23 RX ADMIN — SODIUM CHLORIDE 500 MG: 9 INJECTION, SOLUTION INTRAVENOUS at 10:11

## 2017-04-23 RX ADMIN — PROPOFOL 120 MG: 10 INJECTION, EMULSION INTRAVENOUS at 09:52

## 2017-04-23 RX ADMIN — FENTANYL CITRATE 50 MCG: 50 INJECTION, SOLUTION INTRAMUSCULAR; INTRAVENOUS at 10:38

## 2017-04-23 RX ADMIN — FENTANYL CITRATE 50 MCG: 50 INJECTION, SOLUTION INTRAMUSCULAR; INTRAVENOUS at 09:52

## 2017-04-23 RX ADMIN — HYDROMORPHONE HYDROCHLORIDE 0.5 MG: 10 INJECTION, SOLUTION INTRAMUSCULAR; INTRAVENOUS; SUBCUTANEOUS at 15:05

## 2017-04-23 RX ADMIN — CISATRACURIUM BESYLATE 2 MG: 2 INJECTION INTRAVENOUS at 12:48

## 2017-04-23 RX ADMIN — SODIUM CHLORIDE 1000 ML: 9 INJECTION, SOLUTION INTRAVENOUS at 19:10

## 2017-04-23 RX ADMIN — Medication: at 00:00

## 2017-04-23 RX ADMIN — PANTOPRAZOLE SODIUM 40 MG: 40 TABLET, DELAYED RELEASE ORAL at 17:18

## 2017-04-23 RX ADMIN — FENTANYL CITRATE 50 MCG: 50 INJECTION, SOLUTION INTRAMUSCULAR; INTRAVENOUS at 12:12

## 2017-04-23 RX ADMIN — HYDROMORPHONE HYDROCHLORIDE 0.2 MG: 1 INJECTION, SOLUTION INTRAMUSCULAR; INTRAVENOUS; SUBCUTANEOUS at 13:53

## 2017-04-23 RX ADMIN — SULFAMETHOXAZOLE AND TRIMETHOPRIM 1 TABLET: 400; 80 TABLET ORAL at 17:18

## 2017-04-23 RX ADMIN — SODIUM CHLORIDE 250 ML: 9 INJECTION, SOLUTION INTRAVENOUS at 23:26

## 2017-04-23 RX ADMIN — DEXAMETHASONE SODIUM PHOSPHATE 8 MG: 4 INJECTION, SOLUTION INTRA-ARTICULAR; INTRALESIONAL; INTRAMUSCULAR; INTRAVENOUS; SOFT TISSUE at 11:09

## 2017-04-23 RX ADMIN — HYDROMORPHONE HYDROCHLORIDE: 10 INJECTION, SOLUTION INTRAMUSCULAR; INTRAVENOUS; SUBCUTANEOUS at 20:06

## 2017-04-23 RX ADMIN — SODIUM CHLORIDE, POTASSIUM CHLORIDE, SODIUM LACTATE AND CALCIUM CHLORIDE: 600; 310; 30; 20 INJECTION, SOLUTION INTRAVENOUS at 09:29

## 2017-04-23 RX ADMIN — HYDROMORPHONE HYDROCHLORIDE 0.3 MG: 1 INJECTION, SOLUTION INTRAMUSCULAR; INTRAVENOUS; SUBCUTANEOUS at 14:32

## 2017-04-23 RX ADMIN — SENNOSIDES AND DOCUSATE SODIUM 2 TABLET: 8.6; 5 TABLET ORAL at 22:15

## 2017-04-23 RX ADMIN — FUROSEMIDE 10 MG: 10 INJECTION, SOLUTION INTRAVENOUS at 12:25

## 2017-04-23 RX ADMIN — FUROSEMIDE 10 MG: 10 INJECTION, SOLUTION INTRAVENOUS at 12:23

## 2017-04-23 RX ADMIN — FUROSEMIDE 60 MG: 10 INJECTION, SOLUTION INTRAVENOUS at 14:44

## 2017-04-23 RX ADMIN — SODIUM CHLORIDE, POTASSIUM CHLORIDE, SODIUM LACTATE AND CALCIUM CHLORIDE: 600; 310; 30; 20 INJECTION, SOLUTION INTRAVENOUS at 11:59

## 2017-04-23 RX ADMIN — Medication 3000 UNITS: at 11:51

## 2017-04-23 RX ADMIN — ANTI-THYMOCYTE GLOBULIN (RABBIT) 100 MG: 5 INJECTION, POWDER, LYOPHILIZED, FOR SOLUTION INTRAVENOUS at 10:29

## 2017-04-23 RX ADMIN — FUROSEMIDE 10 MG: 10 INJECTION, SOLUTION INTRAVENOUS at 12:24

## 2017-04-23 RX ADMIN — Medication 2.5 MG: at 13:46

## 2017-04-23 RX ADMIN — FUROSEMIDE 10 MG: 10 INJECTION, SOLUTION INTRAVENOUS at 12:21

## 2017-04-23 RX ADMIN — FENTANYL CITRATE 50 MCG: 50 INJECTION INTRAMUSCULAR; INTRAVENOUS at 14:37

## 2017-04-23 RX ADMIN — CISATRACURIUM BESYLATE 10 MG: 2 INJECTION INTRAVENOUS at 09:53

## 2017-04-23 RX ADMIN — FUROSEMIDE 60 MG: 10 INJECTION, SOLUTION INTRAVENOUS at 20:05

## 2017-04-23 RX ADMIN — ACETAMINOPHEN 1000 MG: 500 TABLET, FILM COATED ORAL at 22:15

## 2017-04-23 RX ADMIN — DEXTROSE AND SODIUM CHLORIDE: 5; 900 INJECTION, SOLUTION INTRAVENOUS at 14:39

## 2017-04-23 RX ADMIN — SODIUM CHLORIDE 1000 ML: 9 INJECTION, SOLUTION INTRAVENOUS at 23:26

## 2017-04-23 RX ADMIN — FENTANYL CITRATE 50 MCG: 50 INJECTION, SOLUTION INTRAMUSCULAR; INTRAVENOUS at 11:23

## 2017-04-23 RX ADMIN — CALCIUM GLUCONATE 50 ML/HR: 94 INJECTION, SOLUTION INTRAVENOUS at 22:03

## 2017-04-23 RX ADMIN — CEFUROXIME 1.5 G: 1.5 INJECTION, POWDER, FOR SOLUTION INTRAVENOUS at 10:25

## 2017-04-23 RX ADMIN — FENTANYL CITRATE 50 MCG: 50 INJECTION, SOLUTION INTRAMUSCULAR; INTRAVENOUS at 13:11

## 2017-04-23 RX ADMIN — DEXTROSE MONOHYDRATE 1000 MG: 50 INJECTION, SOLUTION INTRAVENOUS at 10:29

## 2017-04-23 RX ADMIN — FENTANYL CITRATE 50 MCG: 50 INJECTION INTRAMUSCULAR; INTRAVENOUS at 14:59

## 2017-04-23 RX ADMIN — GLYCOPYRROLATE 0.5 MG: 0.2 INJECTION, SOLUTION INTRAMUSCULAR; INTRAVENOUS at 13:46

## 2017-04-23 ASSESSMENT — ENCOUNTER SYMPTOMS: SEIZURES: 0

## 2017-04-23 ASSESSMENT — PAIN DESCRIPTION - DESCRIPTORS
DESCRIPTORS: ACHING;SORE

## 2017-04-23 ASSESSMENT — LIFESTYLE VARIABLES: TOBACCO_USE: 0

## 2017-04-23 NOTE — ANESTHESIA POSTPROCEDURE EVALUATION
Patient: Rosibel Willingham    Procedure(s):   Donor Kidney transplant   Ureteral stent placement - Wound Class: II-Clean Contaminated    Diagnosis:End Stage Renal Disease  Diagnosis Additional Information: No value filed.    Anesthesia Type:  General, ETT    Note:  Anesthesia Post Evaluation    Patient location during evaluation: PACU  Patient participation: Able to fully participate in evaluation  Level of consciousness: responsive to verbal stimuli and sleepy but conscious  Pain management: satisfactory to patient  Airway patency: patent  Cardiovascular status: acceptable  Respiratory status: acceptable  Hydration status: acceptable     Anesthetic complications: None          Last vitals:  Vitals:    17 1445 17 1500 17 1515   BP: (!) 142/92 143/89 (!) 139/92   Pulse:      Resp: 12 12 12   Temp:  36.7  C (98.1  F) 36.7  C (98.1  F)   SpO2: 99% 100% 100%         Electronically Signed By: Ministerio Crum MD  2017  3:28 PM

## 2017-04-23 NOTE — ANESTHESIA CARE TRANSFER NOTE
Patient: Rosibel Willingham    Procedure(s):   Donor Kidney transplant   Ureteral stent placement - Wound Class: II-Clean Contaminated    Diagnosis: End Stage Renal Disease  Diagnosis Additional Information: No value filed.    Anesthesia Type:   General, ETT     Note:  Airway :Face Mask  Patient transferred to:PACU        Vitals: (Last set prior to Anesthesia Care Transfer)    CRNA VITALS  2017 1349 - 2017 1433      2017             EKG: NSR                Electronically Signed By: Kushal Turner MD  2017  2:33 PM

## 2017-04-23 NOTE — PROGRESS NOTES
SURGERY CROSS COVER NOTE    TRANSPLANT PRIMARY    S:  Called to evaluate patient for cramping abdominal pain that has worsening since dialysis.    Patient reports that cramping suprapubic abdominal pain started during dialysis and has now become quite unbearable.  8-9/10 intensity.  Patient has associated nausea, and had non-bloody emesis 200 cc prior to evaluation.  Denies chest pain. Denies shortness of breath.  LMP 4/10/17.  Reports that menstrual cycles are regular.  Int. Hx of small amounts of blood in stool, no recent BM, last 4/20.      O:    Temp: (P) 97.7  F (36.5  C) Temp src: (P) Oral BP: (!) (P) 144/102 Pulse: 92 Heart Rate: (P) 90 Resp: (P) 18 SpO2: (P) 99 % O2 Device: None (Room air)      Alert. Oriented.  Moderate Discomfort.   AAOx3.  MCNEAL symmetrically. No focal deficits.  RRR  Clear symmetric breath-sounds bilaterally.  Normal work of breathing  Abdomen soft, non distended, moderately tender supra-pubically and LLQ.  +BS. No guarding or rebound.  Ext wwp.    A/P  42 y/o F w/ESRD admitted as DDKT candidate for prospective srugery on 4/23/17.  Now with new cramping abd pain since dialysis.  No recent BM    -BMP, Ca, Mg, Phos- assess for electrolyte abnls causing cramping  -KUB XR  - no evidence of obstruction, air throughout bowel, large stool burden.  -Pain control.  Dilauded 0.2 mg x1 now and 0.2 q1hr prn.  -If no evidence of perforation on KUB start bowel regimen with senna/doc.  And dulcolax suppository.  -CBC, stable repeat in AM.      Tomy Mcclellan MD  Surgery PGY1  [phone]

## 2017-04-23 NOTE — ANESTHESIA PREPROCEDURE EVALUATION
Anesthesia Evaluation     . Pt has had prior anesthetic.     No history of anesthetic complications          ROS/MED HX    ENT/Pulmonary:      (-) tobacco use and asthma   Neurologic:      (-) seizures and CVA   Cardiovascular:     (+) hypertension----. : . . . :. . Previous cardiac testing Echodate:9/26/2016results:Interpretation Summary  Left ventricular function, chamber size, wall motion, and wall thickness are  normal.The EF is 55-60%.  Right ventricular function, chamber size, wall motion, and thickness are  normal.  ______________________________________________________________________________           Left Ventricle  Left ventricular function, chamber size, wall motion, and wall thickness are  normal.The EF is 55-60%. Normal left ventricular filling for age.     Right Ventricle  Right ventricular function, chamber size, wall motion, and thickness are  normal.  Atria  The right atria appears normal. Moderate left atrial enlargement is present.     Mitral Valve  The mitral valve is normal.     Aortic Valve  Aortic valve is normal in structure and function.     Tricuspid Valve  The tricuspid valve is normal. The peak velocity of the tricuspid regurgitant  jet is not obtainable.     Pulmonic Valve  The pulmonic valve is normal.     Vessels  The thoracic aorta is normal. The inferior vena cava was normal in size with  preserved respiratory variability.  Pericardium  No pericardial effusion is present.     ______________________________________________________________________________  MMode/2D Measurements & Calculations  IVSd: 0.65 cm  LVIDd: 4.8 cm  LVIDs: 2.8 cm  LVPWd: 0.97 cm  FS: 41.0 %  EDV(Teich): 108.9 ml  ESV(Teich): 30.8 ml  LV mass(C)d: 129.9 grams  LA dimension: 3.0 cm  asc Aorta Diam: 3.1 cm  LVOT diam: 2.1 cm  LVOT area: 3.6 cm2  LA Volume (BP): 63.0 ml     LA Volume Index (BP): 42.3 ml/m2           Doppler Measurements & Calculations  MV E max kayleen: 80.2 cm/sec  MV A max kayleen: 51.4 cm/sec  MV E/A:  1.6  MV dec time: 0.20 sec  Lateral E/e': 6.6  Medial E/e': 7.0  date: results:ECG reviewed date:4/21/2017 results:NSR date: results:          METS/Exercise Tolerance:  >4 METS   Hematologic:     (+) Anemia, -      Musculoskeletal:         GI/Hepatic:        (-) GERD and liver disease   Renal/Genitourinary: Comment: Left arm fistula    (+) chronic renal disease, type: ESRD, Pt requires dialysis, type: Hemodialysis,       Endo:      (-) Type II DM   Psychiatric:         Infectious Disease:         Malignancy:         Other:    (+) No chance of pregnancy C-spine cleared: N/A, no H/O Chronic Pain,no other significant disability                    Physical Exam  Normal systems: dental    Airway   Mallampati: I  TM distance: >3 FB  Neck ROM: full    Dental     Cardiovascular   Rhythm and rate: regular and normal      Pulmonary    breath sounds clear to auscultation                    Anesthesia Plan      History & Physical Review  History and physical reviewed and following examination; no interval change.    ASA Status:  3 .    NPO Status:  > 8 hours    Plan for General and ETT with Intravenous and Propofol induction. Maintenance will be Balanced.    PONV prophylaxis:  Dexamethasone or Solumedrol and Ondansetron (or other 5HT-3)  Additional equipment: Central Line and 2nd IV - ASA 3  - GETA with standard ASA monitors, IV induction, balanced anesthetic  - PIV x2  - Central line  - Avoid fistula in left arm  - Antibiotics, immunosuppresion per surgery  - PONV prophylaxis  - Pain management with Fentanyl/dilaudid boluses         Postoperative Care  Postoperative pain management:  IV analgesics.      Consents  Anesthetic plan, risks, benefits and alternatives discussed with:  Patient.  Use of blood products discussed: Yes.   Consented to blood products.  .        ANESTHESIA PREOP EVALUATION    Procedure: Procedure(s):   - Wound Class:     HPI: Rosibel Willingham is a 41 year old female presenting for above procedure.  "    PMHx/PSHx/ROS:  Past Medical History:   Diagnosis Date     Anemia of chronic kidney failure      End stage kidney disease (H)      Hypertension      Secondary hyperparathyroidism (H)        Past Surgical History:   Procedure Laterality Date     APPENDECTOMY       CREATE FISTULA ARTERIOVENOUS UPPER EXTREMITY           Past Anes Hx: No personal or family h/o anesthesia problems    Soc Hx:   Social History   Substance Use Topics     Smoking status: Former Smoker     Packs/day: 0.50     Years: 2.00     Types: Cigarettes     Quit date: 9/26/1998     Smokeless tobacco: Never Used     Alcohol use 1.2 oz/week     2 Standard drinks or equivalent per week       Allergies:   Allergies   Allergen Reactions     Erythromycin Hives       Meds:   Prescriptions Prior to Admission   Medication Sig Dispense Refill Last Dose     amLODIPine (NORVASC) 10 MG tablet Take 10 mg by mouth daily   4/21/2017 at 2000     metoprolol (TOPROL-XL) 25 MG 24 hr tablet Take 25 mg by mouth daily   4/21/2017 at 2000       No current outpatient prescriptions on file.       Physical Exam:  Vitals: BP (!) 144/102  Pulse 92  Temp 36.5  C (97.7  F) (Oral)  Resp 18  Ht 1.575 m (5' 2\")  Wt 52.9 kg (116 lb 10 oz)  SpO2 99%  BMI 21.33 kg/m2  BMI= Body mass index is 21.33 kg/(m^2).      Labs:  UPT: No results found for: HCGQUANT      BMP:  Recent Labs   Lab Test  04/23/17   0008   NA  139   POTASSIUM  3.7   CHLORIDE  105   CO2  26   BUN  38*   CR  5.02*   GLC  95   ARLINE  8.3*     CBC:   Recent Labs   Lab Test  04/23/17   0008   WBC  3.7*   RBC  3.11*   HGB  9.2*   HCT  28.1*   MCV  90   MCH  29.6   MCHC  32.7   RDW  15.9*   PLT  103*     Coags:  Recent Labs   Lab Test  04/22/17   1230   INR  1.04   PTT  29       Assessment/Plan:  - ASA 3  - GETA with standard ASA monitors, IV induction, balanced anesthetic  - PIV  - CVP  - Antibiotics per surgery  - PONV prophylaxis        Kushal Turner D.O.          "

## 2017-04-23 NOTE — OP NOTE
Transplant Surgery  Operative Note     Procedure date:  17    Preoperative diagnosis:  End Stage renal failure due to unknown cause    Postoperative diagnosis:  Same,    Procedure:  1. Right kidney  transplant,   - Brain Death, Right  iliac fossa, with venous reconstruction. A J-J ureteral stent was placed.  2. Kidney allograft preparation on Back Table      Surgeon:  LIZ DIOP    Fellow/Assistant:  Mani Whiteside, fellow, [facility] resident     Anesthesia:  General    Specimen:  None    Drains:  Jake-Ruiz drain    Urine output:  55 mls    Estimated blood loss:  40    Fluids administered:    Fluid Amount   Crystalloid (mL) 2000        Indication: The patient has End Stage renal failure due to unknown cause and received an organ offer for a  - Brain Death kidney allograft. After discussing the risks and benefits of proceeding, the patient agreed to proceed with surgery and provided informed consent.  Findings: Integrity of recipient artery: Mild atherosclerosis   Intraoperative Events: None,     Final ABO/Crossmatch verification: After the donor organ arrived to the operating room and prior to anastomosis, I participated in the transplant pre-verification upon organ receipt timeout by visually verifying the donor ID, organ and laterality, donor blood type, recipient unique identifier, recipient blood type, and that the donor and recipient are blood type compatible. The crossmatch was done prospectively; the T cell flow crossmatch result was negative and B cell flow crossmatch result was negative prior to anastomosis.  The patient received Thymoglobulin on induction.    Donor Organ Information:   Donor UNOS ID:  [id number]    Donor arterial clamp on:  2017 11:36 AM    Total ischemic time:  1491 min    Cold ischemic time:  1451 min    Warm ischemic time:  40 min    Preservation fluid:  Saline      Back Table Details:   Procedure:  Bench preparation of the kidney allograft for  transplantation with venous reconstruction    Surgeon:  LIZ DIOP    Faculty Co-Surgeon:  LIZ DIOP    Fellow/Assistant:  Mani Whiteside, fellow, [facility] resident     Donor arrival to recipient room:  4/23/2017  9:28 AM    Graft injury:  No    Graft biopsy:  no    Organ received on:  Ice    Pump resistance:      Pump flow:      Arterial anatomy:  Single    Donor arterial quality:  Mild atherosclerosis    Venous anatomy:  Single    Ureteral anatomy:  Single    Any reconstruction:  Yes    Artery:  None    Vein:  Caval conduit     Complications: None.    Findings: Mild atherosclerosis       None.    Back Table Preparation:  The donor kidney was received and inspected. It had been flushed with HTK. The graft was prepared on the back table by removing perinephric fat and ligating venous tributaries and lymphatics. The ureter was also cleaned of excess tissue. If required, reconstruction was performed as detailed above. The kidney was stored in iced cold preservation solution until ready for transplantation. Faculty was present for the critical portions of the procedure.    Operative Procedure:   Arterial anastomosis start:  4/23/2017 11:47 AM    Arterial unclamp:  4/23/2017 12:27 PM    Extra vessels used:   none      The patient was brought to the operating room, placed in a supine position, and a time out was performed. Sequential compression devices were placed on both lower extremities and general endotracheal anesthesia was induced.  The patient was given IV antibiotics and a Huang catheter. A central line was placed by Anesthesia service. The abdomen was then shaved, prepped, and draped in the usual sterile fashion.  An incision was made in the right lower quadrant and carried down through the subcutaneous tissue and the abdominal wall fascia. If encountered, the epigastric vessels were ligated in continuity, divided and secured with surgical clips. The right iliac artery and vein were exposed.  The retractor system was placed and the lymphatics overlying the vessels were serially ligated and divided.     The patient was heparinized. We applied atraumatic vascular clamps and the donor kidney was brought to the operative field. We made a venotomy and the renal vein was anastomosed to the recipient right Common Iliac vein in an end-to-side fashion. An arteriotomy was made and the donor renal artery was anastomosed to the recipient right common iliac artery  in an end to side fashion. The patient was simultaneously loaded with IV mannitol, Lasix and volume. The renal artery was protected and the clamps were removed. After several cardiac cycles, we opened the renal artery and the kidney had Good reperfusion and was firm.    The transplant ureter was managed by creating a Liche (anterior multistitch) anastomosis with absorbable suture. A stent was placed across the anastomosis. The kidney made No urine prior to implantation.    Hemostasis was obtained, the anastomoses inspected, and the kidney placed in the iliac fossa. After placement, the vessel lay was inspected and found to be acceptable. The kidney position was Retroperitoneal. The field was irrigated with antibiotic solution. A drain was placed. The retractor was removed and the abdominal wall fascia reapproximated. Subcutaneous tissues were irrigated and hemostasis obtained.  The skin was reapproximated with running subcuticular stitch and dermabond was applied.   All needle, sponge and instrument counts were correct x 2. The patient was awakened, extubated, and transferred to PACU for post-op monitoring. Faculty was present for key portions of the procedure.I was present during the key portions of the procedure, and I was immediately available for the entire procedure between opening and closing

## 2017-04-23 NOTE — ANESTHESIA PROCEDURE NOTES
Central Line Procedure Note  Staff:     Anesthesiologist:  TONY FOUNTAIN    Resident/CRNA:  RON LIGHT    Central line placed by Resident/CRNA in the presence of a teaching physician    Location: In OR after induction  Procedure Start/Stop Times:     patient identified, IV checked, site marked, risks and benefits discussed, informed consent, monitors and equipment checked, pre-op evaluation and at physician/surgeon's request      Correct Patient: Yes      Correct Position: Yes      Correct Site: Yes      Correct Procedure: Yes      Correct Laterality:  Yes    Site Marked:  Yes  Line Placement:     Procedure:  Central Line    Insertion laterality:  Left    Insertion site:  Internal Jugular    Position:  Trendelenburg      Maximal Sterile Barriers: All elements of maximal sterile barrier technique followed      (Maximal sterile barriers include:   Sterile gown, Sterile Gloves, Mask, Cap, Whole body draped, hand hygiene and acceptable skin prep).       Injection Technique:  Ultrasound guided and Seldinger Technique    Sterile Ultrasound Technique:  Sterile probe cover and Sterile gel    Vein evaluated via U/S for patency/adequacy of catheter insertion and is adequate.  Using realtime U/S imaging the vein was punctured, and needle was observed entering vein on U/S      Catheter size:  7 Fr, 3 lumen, 20 cm    Catheter length at skin (cm):  17    Cath secured with: suture      Dressing:  Tegaderm and Biopatch    Complications:  None obvious    Blood aspirated all lumens: Yes      All Lumens Flushed: Yes      Verification method:  Placement to be verified post-op  Assessment/Narrative:      Pt with history of difficult placement on the right with reported trauma to right carotid artery. Upon initial scan, right IJ small and directly overlying carotid. Left IJ large and patent. Inserted left IJ triple lumen CVC with no difficulty.

## 2017-04-23 NOTE — PROGRESS NOTES
Pt arrived to 6B from PACU at 1600. Pt placed on cardiac monitor and pulse oximetry. Pt already on capnography. VSS. Pt oriented to room and unit routines, instructed on call light use.  at bedside. 2 RN skin assessment not completed d/t patient refusing turns d/t pain- pt states she will allow at a later time.

## 2017-04-23 NOTE — PROGRESS NOTES
Writer introduced self to patient.  She completed her second shower.  Compression stockings applied.  Chart reviewed and all requirements for pre-Op met.  Night RN, Apple Trejo, gave report to the OR RN during shift.   Reviewed with patient what to expect after surgery.  Her , mother, and daughter have arrived.  Transport should pick her up any minute.

## 2017-04-23 NOTE — PROVIDER NOTIFICATION
On-call notified of pt complaints of menstrual-like abd cramps progressively getting worse during dialysis. Pt reports last BM 4/20. BS active in all 4 quadrants. Will await advisement.

## 2017-04-23 NOTE — PHARMACY-TRANSPLANT NOTE
Adult Kidney Transplant Post Operative Note    41 year old female s/p  donor kidney transplant on 16 for End Stage Renal disease of unknown etiology.      Planned immunosuppression regimen per kidney transplant protocol:  INDUCTION: thymoglobulin to total ~ 6mg/kg and methylprednisolone IV daily x 3 doses used as pre-med for thymoglobulin.  MAINTENANCE:  mycophenolate and tacrolimus with goal trough levels of 8-10 mcg/L for first 6 months post-transplant.    Opportunistic pathogen prophylaxis includes: trimethoprim/sulfamethoxazole and valganciclovir.    Patient is not enrolled in medication study.    Pharmacy will monitor for medication interactions and immunosuppression levels in conjunction with the team. Medication therapy needs for discharge planning will continue to be addressed throughout the current admission via multidisciplinary rounds and order review.  Pharmacy will make recommendations as appropriate.      Adalberto Melton, PharmD  PGY-1 Resident Pharmacist

## 2017-04-23 NOTE — PROGRESS NOTES
HEMODIALYSIS TREATMENT NOTE    Date: 4/23/2017  Time: 12:41 AM    Data:   Pre Wt:  kg 52.9   Desired Wt: kg 52.9   Approximate (Net) Weight Removed: kg 0 Kg   Vascular Access Status: Access/lines visible and secure, Prescribed BFR achieved   Dialyzer Rinse: Clear   Total Blood Volume Processed:     Total Dialysis (Treatment) Time: 1 hours and 30 minutes    Assessment/Interventions:  1.5 hours of HD run via AVF on left upper arm with 16 gauge needle. She dialyzed 300ml/min BFR with no fluid removal.   Patient started c/o pain on her lower belly area. Patient described the pain as menstrual cramping. Patient's dialysis treatment ended 30 minutes early d/t pain on her lower belly area and vomiting. Nephrologist notified before treatment terminated. Hand off report given to the primary RN.          Plan:    Next HD treatment per renal team.

## 2017-04-24 ENCOUNTER — APPOINTMENT (OUTPATIENT)
Dept: ULTRASOUND IMAGING | Facility: CLINIC | Age: 42
DRG: 652 | End: 2017-04-24
Attending: TRANSPLANT SURGERY
Payer: COMMERCIAL

## 2017-04-24 LAB
ANION GAP SERPL CALCULATED.3IONS-SCNC: 8 MMOL/L (ref 3–14)
BASOPHILS # BLD AUTO: 0 10E9/L (ref 0–0.2)
BASOPHILS NFR BLD AUTO: 0 %
BUN SERPL-MCNC: 21 MG/DL (ref 7–30)
CALCIUM SERPL-MCNC: 8.2 MG/DL (ref 8.5–10.1)
CHLORIDE SERPL-SCNC: 103 MMOL/L (ref 94–109)
CMV IGG SERPL QL IA: ABNORMAL AI (ref 0–0.8)
CMV IGM SERPL QL IA: 0.4 AI (ref 0–0.8)
CO2 SERPL-SCNC: 26 MMOL/L (ref 20–32)
CREAT FLD-MCNC: 4.3 MG/DL
CREAT SERPL-MCNC: 3.9 MG/DL (ref 0.52–1.04)
DIFFERENTIAL METHOD BLD: ABNORMAL
EBV VCA IGG SER QL IA: ABNORMAL AI (ref 0–0.8)
EBV VCA IGM SER QL IA: 0.2 AI (ref 0–0.8)
EOSINOPHIL # BLD AUTO: 0 10E9/L (ref 0–0.7)
EOSINOPHIL NFR BLD AUTO: 0 %
ERYTHROCYTE [DISTWIDTH] IN BLOOD BY AUTOMATED COUNT: 16 % (ref 10–15)
GFR SERPL CREATININE-BSD FRML MDRD: 13 ML/MIN/1.7M2
GLUCOSE BLDC GLUCOMTR-MCNC: 189 MG/DL (ref 70–99)
GLUCOSE BLDC GLUCOMTR-MCNC: 218 MG/DL (ref 70–99)
GLUCOSE BLDC GLUCOMTR-MCNC: 219 MG/DL (ref 70–99)
GLUCOSE BLDC GLUCOMTR-MCNC: 222 MG/DL (ref 70–99)
GLUCOSE BLDC GLUCOMTR-MCNC: 223 MG/DL (ref 70–99)
GLUCOSE SERPL-MCNC: 158 MG/DL (ref 70–99)
HBV CORE IGM SERPL QL IA: NORMAL
HBV SURFACE AG SERPL QL IA: NONREACTIVE
HCT VFR BLD AUTO: 23.2 % (ref 35–47)
HCV AB SERPL QL IA: NORMAL
HGB BLD-MCNC: 7.2 G/DL (ref 11.7–15.7)
HGB BLD-MCNC: 7.3 G/DL (ref 11.7–15.7)
HGB BLD-MCNC: 7.6 G/DL (ref 11.7–15.7)
HGB BLD-MCNC: 7.8 G/DL (ref 11.7–15.7)
HIV 1+2 AB+HIV1 P24 AG SERPL QL IA: NORMAL
HLA FINAL CROSSMATCH RECIPIENT: NORMAL
IMM GRANULOCYTES # BLD: 0 10E9/L (ref 0–0.4)
IMM GRANULOCYTES NFR BLD: 0.2 %
LYMPHOCYTES # BLD AUTO: 0.1 10E9/L (ref 0.8–5.3)
LYMPHOCYTES NFR BLD AUTO: 0.9 %
MAGNESIUM SERPL-MCNC: 1.6 MG/DL (ref 1.6–2.3)
MCH RBC QN AUTO: 29 PG (ref 26.5–33)
MCHC RBC AUTO-ENTMCNC: 31.5 G/DL (ref 31.5–36.5)
MCV RBC AUTO: 92 FL (ref 78–100)
MONOCYTES # BLD AUTO: 0.5 10E9/L (ref 0–1.3)
MONOCYTES NFR BLD AUTO: 4.2 %
NEUTROPHILS # BLD AUTO: 10.4 10E9/L (ref 1.6–8.3)
NEUTROPHILS NFR BLD AUTO: 94.7 %
NRBC # BLD AUTO: 0 10*3/UL
NRBC BLD AUTO-RTO: 0 /100
PHOSPHATE SERPL-MCNC: 3.9 MG/DL (ref 2.5–4.5)
PLATELET # BLD AUTO: 106 10E9/L (ref 150–450)
POTASSIUM SERPL-SCNC: 3.7 MMOL/L (ref 3.4–5.3)
POTASSIUM SERPL-SCNC: 4.6 MMOL/L (ref 3.4–5.3)
POTASSIUM SERPL-SCNC: 4.8 MMOL/L (ref 3.4–5.3)
POTASSIUM SERPL-SCNC: 4.8 MMOL/L (ref 3.4–5.3)
RBC # BLD AUTO: 2.52 10E12/L (ref 3.8–5.2)
SODIUM SERPL-SCNC: 137 MMOL/L (ref 133–144)
SPECIMEN SOURCE FLD: NORMAL
WBC # BLD AUTO: 11 10E9/L (ref 4–11)

## 2017-04-24 PROCEDURE — 84100 ASSAY OF PHOSPHORUS: CPT | Performed by: TRANSPLANT SURGERY

## 2017-04-24 PROCEDURE — 82570 ASSAY OF URINE CREATININE: CPT | Performed by: PHYSICIAN ASSISTANT

## 2017-04-24 PROCEDURE — 25000128 H RX IP 250 OP 636: Performed by: PHYSICIAN ASSISTANT

## 2017-04-24 PROCEDURE — 83735 ASSAY OF MAGNESIUM: CPT | Performed by: TRANSPLANT SURGERY

## 2017-04-24 PROCEDURE — 25000125 ZZHC RX 250: Performed by: TRANSPLANT SURGERY

## 2017-04-24 PROCEDURE — 84132 ASSAY OF SERUM POTASSIUM: CPT | Performed by: TRANSPLANT SURGERY

## 2017-04-24 PROCEDURE — 00000146 ZZHCL STATISTIC GLUCOSE BY METER IP

## 2017-04-24 PROCEDURE — 25000132 ZZH RX MED GY IP 250 OP 250 PS 637: Performed by: TRANSPLANT SURGERY

## 2017-04-24 PROCEDURE — 76776 US EXAM K TRANSPL W/DOPPLER: CPT

## 2017-04-24 PROCEDURE — 12000025 ZZH R&B TRANSPLANT INTERMEDIATE

## 2017-04-24 PROCEDURE — 25000132 ZZH RX MED GY IP 250 OP 250 PS 637: Performed by: SURGERY

## 2017-04-24 PROCEDURE — 25000131 ZZH RX MED GY IP 250 OP 636 PS 637: Performed by: TRANSPLANT SURGERY

## 2017-04-24 PROCEDURE — 80048 BASIC METABOLIC PNL TOTAL CA: CPT | Performed by: TRANSPLANT SURGERY

## 2017-04-24 PROCEDURE — 85025 COMPLETE CBC W/AUTO DIFF WBC: CPT | Performed by: TRANSPLANT SURGERY

## 2017-04-24 PROCEDURE — 85018 HEMOGLOBIN: CPT | Performed by: TRANSPLANT SURGERY

## 2017-04-24 PROCEDURE — 36592 COLLECT BLOOD FROM PICC: CPT | Performed by: TRANSPLANT SURGERY

## 2017-04-24 PROCEDURE — 25000132 ZZH RX MED GY IP 250 OP 250 PS 637: Performed by: PHYSICIAN ASSISTANT

## 2017-04-24 PROCEDURE — 25000128 H RX IP 250 OP 636: Performed by: TRANSPLANT SURGERY

## 2017-04-24 RX ORDER — ACETAMINOPHEN 325 MG/1
650 TABLET ORAL ONCE
Status: COMPLETED | OUTPATIENT
Start: 2017-04-24 | End: 2017-04-24

## 2017-04-24 RX ORDER — OXYCODONE AND ACETAMINOPHEN 5; 325 MG/1; MG/1
1-2 TABLET ORAL EVERY 4 HOURS PRN
Status: DISCONTINUED | OUTPATIENT
Start: 2017-04-24 | End: 2017-04-24

## 2017-04-24 RX ORDER — DIPHENHYDRAMINE HCL 12.5MG/5ML
25-50 LIQUID (ML) ORAL ONCE
Status: COMPLETED | OUTPATIENT
Start: 2017-04-24 | End: 2017-04-24

## 2017-04-24 RX ORDER — DIPHENHYDRAMINE HCL 25 MG
25-50 CAPSULE ORAL ONCE
Status: COMPLETED | OUTPATIENT
Start: 2017-04-24 | End: 2017-04-24

## 2017-04-24 RX ORDER — HYDROMORPHONE HCL/0.9% NACL/PF 0.2MG/0.2
0.2 SYRINGE (ML) INTRAVENOUS
Status: ACTIVE | OUTPATIENT
Start: 2017-04-24 | End: 2017-04-25

## 2017-04-24 RX ORDER — FUROSEMIDE 10 MG/ML
80 INJECTION INTRAMUSCULAR; INTRAVENOUS 3 TIMES DAILY
Status: DISCONTINUED | OUTPATIENT
Start: 2017-04-24 | End: 2017-04-25

## 2017-04-24 RX ORDER — OXYCODONE HYDROCHLORIDE 5 MG/1
5 TABLET ORAL EVERY 4 HOURS PRN
Status: DISCONTINUED | OUTPATIENT
Start: 2017-04-24 | End: 2017-04-27 | Stop reason: HOSPADM

## 2017-04-24 RX ADMIN — FUROSEMIDE 60 MG: 10 INJECTION, SOLUTION INTRAVENOUS at 08:10

## 2017-04-24 RX ADMIN — ACETAMINOPHEN 1000 MG: 500 TABLET, FILM COATED ORAL at 18:49

## 2017-04-24 RX ADMIN — CLOTRIMAZOLE 10 MG: 10 LOZENGE ORAL at 08:11

## 2017-04-24 RX ADMIN — DIPHENHYDRAMINE HYDROCHLORIDE 50 MG: 25 CAPSULE ORAL at 11:13

## 2017-04-24 RX ADMIN — ONDANSETRON 4 MG: 2 INJECTION INTRAMUSCULAR; INTRAVENOUS at 08:08

## 2017-04-24 RX ADMIN — PROCHLORPERAZINE MALEATE 5 MG: 5 TABLET, FILM COATED ORAL at 09:36

## 2017-04-24 RX ADMIN — ACETAMINOPHEN 1000 MG: 500 TABLET, FILM COATED ORAL at 06:58

## 2017-04-24 RX ADMIN — SODIUM CHLORIDE 250 MG: 9 INJECTION, SOLUTION INTRAVENOUS at 11:14

## 2017-04-24 RX ADMIN — MYCOPHENOLATE MOFETIL 750 MG: 250 CAPSULE ORAL at 08:10

## 2017-04-24 RX ADMIN — MYCOPHENOLATE MOFETIL 750 MG: 250 CAPSULE ORAL at 17:50

## 2017-04-24 RX ADMIN — FUROSEMIDE 80 MG: 10 INJECTION, SOLUTION INTRAVENOUS at 20:26

## 2017-04-24 RX ADMIN — ACETAMINOPHEN 650 MG: 325 TABLET, FILM COATED ORAL at 11:13

## 2017-04-24 RX ADMIN — ASPIRIN 325 MG: 325 TABLET, DELAYED RELEASE ORAL at 11:13

## 2017-04-24 RX ADMIN — PANTOPRAZOLE SODIUM 40 MG: 40 TABLET, DELAYED RELEASE ORAL at 08:11

## 2017-04-24 RX ADMIN — PROCHLORPERAZINE MALEATE 5 MG: 5 TABLET, FILM COATED ORAL at 10:35

## 2017-04-24 RX ADMIN — VALGANCICLOVIR HYDROCHLORIDE 450 MG: 450 TABLET ORAL at 08:11

## 2017-04-24 RX ADMIN — FUROSEMIDE 80 MG: 10 INJECTION, SOLUTION INTRAVENOUS at 14:22

## 2017-04-24 RX ADMIN — SENNOSIDES AND DOCUSATE SODIUM 2 TABLET: 8.6; 5 TABLET ORAL at 21:42

## 2017-04-24 RX ADMIN — ANTI-THYMOCYTE GLOBULIN (RABBIT) 100 MG: 5 INJECTION, POWDER, LYOPHILIZED, FOR SOLUTION INTRAVENOUS at 11:52

## 2017-04-24 RX ADMIN — SULFAMETHOXAZOLE AND TRIMETHOPRIM 1 TABLET: 400; 80 TABLET ORAL at 08:11

## 2017-04-24 RX ADMIN — DEXTROSE AND SODIUM CHLORIDE: 5; 900 INJECTION, SOLUTION INTRAVENOUS at 04:16

## 2017-04-24 RX ADMIN — METOROPROLOL TARTRATE 5 MG: 5 INJECTION, SOLUTION INTRAVENOUS at 08:10

## 2017-04-24 ASSESSMENT — PAIN DESCRIPTION - DESCRIPTORS: DESCRIPTORS: ACHING;CONSTANT

## 2017-04-24 NOTE — PLAN OF CARE
Problem: Goal Outcome Summary  Goal: Goal Outcome Summary  Outcome: Improving  Pt. Arrived via her bed form Unit 6B. Assumed care at 1330. Pt. Alert, with family members at bedside. Rates her pain at '2' unless she is moving. C/o increased pain with activity. Capnography in use. Thymo dose # 2 infusing over 6 hours. Huang catheter in place but UOP is scant and red. Rt. OCTAVIO bulb to suction with serosanguinous OP. Former unit RN irrigated and did a bladder scan prior to transferring patient. Plan:Monitor per orders.

## 2017-04-24 NOTE — TELEPHONE ENCOUNTER
Organ Offer Encounter Information    Organ Offer Information   Organ offer date & time:  4/21/2017  6:05 PM   Coordinator/Fellow/Attending name:  HELADIO POZO   Organ(s):   Organ UNOS ID Match Run ID Comment Organ Laterality   Kidney [id number] NJTO          Recent infections?:  No    New medications?:  No Recent pregnancy?:  No   Angicoagulation medications?:  No Recent vaccinations?:  Yes (Comment: HBV vaccine 1 month ago)   Recent blood transfusions?:  No Recent hospitalizations?:  No   Has your insurance changed in the last 6-12 months?:  Neg    Patient last dialyzed:  4/20/2017  6:06 PM   Dialysis type:  [facility]   Discussed organ offer with:  Patient   Patient/Caregiver name:  Rosibel   Discussed risk category with Patient/Other:  N/A   Understood donor criteria, verbalized understanding   Patient/Other asked to speak to a surgeon?:  No   Discussed program-specific outcomes:  Did not have questions regarding SRTR   Right to decline organ offer without penalty, Patient/Other:  Aware of option to decline without penalty   Organ offer decision status Patient/Other:  Accepted Offer   Organ disposition:  Transplanted   Additional Comments:  April 21, 2017 6:09 PM  Kidney: Back Up pending final allocation, Import, DBD, 0MM  MD: Jarrett  Plan: VXM done by Dr Holman, reviewed with Dr. Whiteside.  Plan to call patient with offer; if interested, will need to come to [place] to have blood drawn for XM.  Spoke to patient regarding offer, accepted offer.  Explained that it is technically back up until allocation finalized, she verbalized understanding of this.  Has flight departing at 0630 with ETA 1030, will plan to come to lab to have blood drawn unless donor OR time becomes available before then.  Donor blood ETA 1000 tomorrow, Will job #[id number].  Provided contact info.  Will f/u with updates in OR timing or allocation changes.  Yaneli Pozo, RN   Transplant Coordinator    4/21/2017 6:42 PM:  Called patient-no  answer-left VM to provide contact information. No donor OR set yet. Patient called back later--understood no new updates. Has my number in case there are any questions.  Pari Dumont  Transplant Coordinator    4/22/2017 6:23 AM:  Called patient this morning to inform donor OR set for 0800 EST. Plan to be NPO from this point forward. Patient will be admitted around 1100 instead of coming to outpatient lab. Requested local backup or final XM waivers from [place]([phone])--awaiting answer. [place] from  is looking up potential flight options with a 1300 organ pickup time.   Pari Dumont    Admissions: Done 0635  Unit: [address] [place] [address] 0640  Immunology: [facility]45  OR: [facility] 1825  Blood Bank: [facility] 1833  TransNet/ABO Verification: Done 1842  Add Organ: Done  Transplant Coordinator    4/22/2017 6:38 PM:  RIGHT kidney accepted. Unable to get the kidney here tonight. Dr. Whiteside spoke with patient who wants to proceed with it even though it will have almost 24 hours CIT. Will Job #[id number]. Kidney will depart [facility] at 0610--arriving in [facility] 0812 with delivery by 1015. Recipient OR set for 0930.   Pari Dumont  Transplant Coordinator         Attestation I have discussed all of the above with the Patient/Legal Guardian/Caregiver regarding this organ offer.:  Yes   Coordinator/Fellow/Attending name:  HELADIO POZO

## 2017-04-24 NOTE — PROGRESS NOTES
Pt. VSS . Alert oriented x4.c/o nausea most of the shift. Antiemetics given with relief.Surgical site dry and intact.Bladder irrigation done x1.Ultrasound done to R kidney  MD aware of the results. Was up to the chair pt tolerated.Transfered to  with all her belongings report given to RN.

## 2017-04-24 NOTE — PROGRESS NOTES
CLINICAL NUTRITION SERVICES - ASSESSMENT NOTE     Nutrition Prescription    RECOMMENDATIONS FOR MDs/PROVIDERS TO ORDER:  Continue low K+ diet    Malnutrition Status:    Unable to determine due to unable to obtain nutrition hx and complete full physical assessment. However, pt appeared well-nourished upon brief visit.    Recommendations already ordered by Registered Dietitian (RD):  None at this time    Future/Additional Recommendations:  -Will need review of post-op diet guidelines as able. Unable to see pt at this time d/t nausea. Left handouts with /SO  -Monitor for diet tolerance vs need for supplements (would rec Nepro or Ensure Clear)     REASON FOR ASSESSMENT  Rosibel Willingham is a/an 41 year old female assessed by the dietitian for Provider Order - Nutrition Education - assess and educate post-op kidney transplant    NUTRITION HISTORY  - Per chart review, pt received post-op transplant diet education 9/26/16 during initial assessment. Pt had been on HD 3x/week since 6/2016 and reported following a low Na+, low K+, low Phos diet. The pt and her  typically cooked meals and ate out 3-4x/week, but the pt would modify the food she ordered or avoid certain things altogether. Typical intake was as follows:   B: bagel with CC (adds 2 eggs on the weekends) or brings 1-2 protein bars (Sandra bars, Sandra Protein, Atkins, Special K) with apple and fortino grahams to dialysis    L: turkey s/w on white with cheese    D: spaghetti with meat (limits sauce), chix (6 oz) with Mrs Dash, broccoli, rice    Sn: protein bars, fortino grahams, gummy bears    Beverages: coffee with cream and sugar, water, Sprite    ETOH (1 drink = 12 oz beer, 5 oz wine, 1.5 oz liquor): 2 drinks/week   - Attempted to visit pt, but pt stated it wasn't a good time. Per RN, pt is very nauseous. Was able to leave education materials with ?/SO.    CURRENT NUTRITION ORDERS  Diet: Regular, 3 gm K+  Intake/Tolerance: Per flow sheets, pt with  "intake of 100% at one meal yesterday however, pt was on clear liquids at that time. No intake yet today d/t nausea.    LABS  Labs reviewed  K+ elevated at 6.5 4/23, now 4.8 (WNL)    MEDICATIONS  Medications reviewed    ANTHROPOMETRICS  Height: 157.5 cm (5' 2\")  Most Recent Weight: 53.1 kg (117 lb)    IBW: 50 kg   BMI: Normal BMI  Weight History: Per nephrology, pt's dry weight is ~52 kg.  Wt Readings from Last 10 Encounters:   04/23/17 53.1 kg (117 lb)   09/26/16 51 kg (112 lb 8 oz)     Dosing Weight: 53 kg (based on driest wt this admission)     ASSESSED NUTRITION NEEDS  Estimated Energy Needs: 0687-0312 kcals/day (30 - 35 kcals/kg )  Justification: Increased needs and Post-op  Estimated Protein Needs:  grams protein/day (1.3 - 2 grams of pro/kg)  Justification: Increased needs and Post-op  Estimated Fluid Needs: 1 mL/kcal or per provider  Justification: Maintenance or Per provider pending fluid status    PHYSICAL FINDINGS  See malnutrition section below.  No abnormal nutrition-related physical findings observed.     MALNUTRITION  % Intake: Unable to assess  % Weight Loss: None noted, however minimal hx d/t no \"Care Everywhere\"  Subcutaneous Fat Loss: None observed - brief view  Muscle Loss: None observed - brief view  Fluid Accumulation/Edema: None noted  Malnutrition Diagnosis: Unable to determine due to unable to obtain nutrition hx and complete full physical assessment. However, pt appeared well-nourished upon brief visit.    NUTRITION DIAGNOSIS  Food- and nutrition-related knowledge deficit related to length of time since post-op transplant diet education (9/26/16) as evidenced by pt receiving diet education in the past, however, declined at this time d/t nausea.       INTERVENTIONS  Implementation  - Nutrition Education: Unable to complete due to pt declined at this time d/t nausea. Left handouts (your diet after transplant, food safety booklet, tips for heart healthy diet) with /SO.   - " Collaboration with other providers - discussed pt during rounds    Goals  1. Patient will be able to verbalize 3 aspects of post-op diet guidelines (heart healthy, food safety, high protein).  2. Patient to consume >75% of meals/supplements TID.     Monitoring/Evaluation  Progress toward goals will be monitored and evaluated per protocol.      Mateo Murray, Dietetic Intern    I have read and agree with the above assessment and recommendations.  Karen Au RD, LD  6B RD Pager: [phone]

## 2017-04-24 NOTE — PLAN OF CARE
Problem: Goal Outcome Summary  Goal: Goal Outcome Summary  Outcome: No Change  Assumed care of pt from 8215-3311. Pt frequently drowsy, but oriented x4. Afebrile, BPs 120s/70s, NSR with HR 70-80s. CVPs 7-11. O2 sats in upper 90s on 4L O2 via nasal cannula. RR 8-10. On capnography: CO2 30-40, IPI 7-10. LS diminished. Pt encouraged to deep breath and use incentive spirometer. Bowel sounds hypoactive in all quadrants. Tolerating clear liquids. Huang in place with low UOP, approx 8-15 mL/hr (see previous provider notification note). RLQ incision dermabonded, no drainage, c/d/i. RLQ OCTAVIO with moderate amounts of bloody output. L) AV fistula with bruit and thrill present. Dilaudid PCA at 0.2mg Q 10 minutes. D5NS infusing at 75mL/hr. Pt not yet out of bed since surgery. Continue with POC.

## 2017-04-24 NOTE — PROGRESS NOTES
Transplant Surgery  Inpatient Daily Progress Note  04/24/2017    Assessment & Plan: Rosibel Willingham is a 41 year old female with ESRD of unknown origin on dialysis since July 2016. Dry weight 52kg. Patient continued to make her normal UO while on HD. S/p DDKT with ureteral stent placement on 4/23/2017. CIT 25hrs. HD on POD 0 due to hyperkalemia.     Graft function: DGF. HD on POD 0 due to hyperkalemia.  cc yesterday and 53 cc since midnight. Expected ATN due to long CIT. Unclear etiology for native kidneys not producing urine post transplant. Huang appears patent s/p flushing. Check PVR. Drain SS output 225 cc thus far. Will check fluid for creatinine. Possible due to lower BP, normal BP for patient 140s-160s per her report.   US transplant kidney this morning. showing good flow and no obstruction.   Pain: scheduled tylenol. Stop PCA. Start oxycodone PRN.   Immunosuppression management:   Thymo induction: 100 mg intra-op, 100 mg POD 1  Solu-medrol 500 mg intra-op, 250 mg POD 1  Mycophenolate 750 mg BID   Tacrolimus, hold start of CNI due to DGF.   DSA in process   Complexity of management:High. Contributing factors: induction, DGF  Hematology: Hbg 11.3 pre op, decreased to 9.2 immediately post op. Hbg 7.8 up from last check 7.3. Continue to monitor q4hrs. .  mg started due to venous reconstruction.   Cardiorespiratory:   HTN: [id number]. Last /97.  Patient on amlodipine 5 mg PTA. PTA Metoprolol discontinued about 4 months ago. Stop metoprolol IV. Monitor.   Stable on RA  GI/Nutrition: PRABHAKAR, low k.   Endocrine: Hyperglycemia secondary to steroids. Monitor.  Fluid/Electrolytes: MIVF: Change to straight rate, decrease rate, D5 NS 40 cc/hr. Monitor fluid intake.  K 3.7 after HD, now 4.6 this am. Increase lasix 80 mg IV TID. Recheck in 4 hrs. Low K diet.   CKD BMD: . Vitamin D pending. Monitor.   : Huang to remain due to new surgical anastomosis  Infectious disease: afebrile.  "  Prophylaxis: DVT PCDs. ID ppx: Valcyte (CMV R+D+, EBV R+D+) x 12 weeks, Bactrim  Disposition: Transfer to      Medical Decision Making: High  Subsequent visit 21081 (high level decision making)    OG/Fellow/Resident Provider: Kimberly Perry PA-C    Faculty: Nicolas Winkler M.D., Ph.D.  _________________________________________________________________  Transplant History: Admitted 4/22/2017 for kidney transplant.  4/23/2017 (Kidney), Postoperative day: 1     Interval History: History is obtained from the patient  Overnight events: Pain control fair. IV medication effective but makes patient quite sedated. Mild nausea this AM. No SOB. Feels general mild abdominal distension.     ROS:   A 10-point review of systems was negative except as noted above.    Meds:    acetaminophen  650 mg Oral Once     diphenhydrAMINE  25-50 mg Oral Once    Or     diphenhydrAMINE  25-50 mg Per NG tube Once     methylPREDNISolone  250 mg Intravenous Once     anti-thymocyte globulin  2 mg/kg Intravenous Central line Once     sodium chloride (PF)  10 mL Intracatheter Q8H     valGANciclovir  450 mg Oral Once per day on Mon Thu     metoprolol  5 mg Intravenous Q6H     clotrimazole  10 mg Buccal TID     sulfamethoxazole-trimethoprim  1 tablet Oral Daily     mycophenolate  750 mg Oral BID IS     pantoprazole  40 mg Oral Daily     furosemide  60 mg Intravenous TID     senna-docusate  2 tablet Oral At Bedtime     acetaminophen  1,000 mg Oral Q8H AGUSTIN       Physical Exam:     Admit Weight: 52.9 kg (116 lb 10 oz)    Current vitals:   BP (!) 146/97 (BP Location: Right leg)  Pulse 92  Temp 98.4  F (36.9  C) (Oral)  Resp 16  Ht 1.575 m (5' 2\")  Wt 53.1 kg (117 lb)  SpO2 97%  BMI 21.4 kg/m2        Vital sign ranges:    Temp:  [97.5  F (36.4  C)-98.6  F (37  C)] 98.4  F (36.9  C)  Heart Rate:  [72-89] 84  Resp:  [9-16] 16  BP: (104-170)/() 146/97  SpO2:  [93 %-100 %] 97 %  Patient Vitals for the past 24 hrs:   BP Temp Temp src Heart Rate " Resp SpO2   04/24/17 0900 (!) 146/97 98.4  F (36.9  C) Oral - 16 -   04/24/17 0832 116/72 98.4  F (36.9  C) Oral - 16 -   04/24/17 0800 (!) 147/97 - - - - -   04/24/17 0756 - 98.2  F (36.8  C) Oral - 16 -   04/24/17 0700 129/85 98  F (36.7  C) Oral 84 14 97 %   04/24/17 0600 123/83 - - 81 12 96 %   04/24/17 0500 136/88 - - 83 12 98 %   04/24/17 0400 137/87 - - 86 12 99 %   04/24/17 0303 146/90 98.5  F (36.9  C) Oral 85 12 99 %   04/24/17 0200 (!) 158/97 - - 88 10 100 %   04/24/17 0120 - 98.5  F (36.9  C) Oral - 12 -   04/24/17 0100 - - - - 11 -   04/24/17 0000 129/84 - - 79 10 100 %   04/23/17 2300 117/78 - - 77 10 99 %   04/23/17 2220 114/73 - - 77 9 94 %   04/23/17 2121 104/74 - - 72 10 96 %   04/23/17 2010 108/70 97.5  F (36.4  C) Oral 79 12 93 %   04/23/17 1920 120/74 - - 76 - -   04/23/17 1850 124/78 - - 76 - 97 %   04/23/17 1820 115/75 - - 80 - 97 %   04/23/17 1750 118/75 - - 81 - 97 %   04/23/17 1720 125/80 - - 86 - -   04/23/17 1710 124/81 - - 85 - -   04/23/17 1700 127/76 - - 86 - 95 %   04/23/17 1640 125/82 - - 86 - -   04/23/17 1630 136/87 - - 87 - -   04/23/17 1610 (!) 140/94 - - 89 - -   04/23/17 1600 - - - - - 99 %   04/23/17 1558 150/87 98.6  F (37  C) Oral 86 10 100 %   04/23/17 1515 (!) 139/92 98.1  F (36.7  C) Oral 87 12 100 %   04/23/17 1500 143/89 98.1  F (36.7  C) Oral 88 12 100 %   04/23/17 1445 (!) 142/92 - - 89 12 99 %   04/23/17 1430 (!) 162/95 - - 87 12 100 %   04/23/17 1423 (!) 170/150 98.4  F (36.9  C) Oral - 12 100 %     General Appearance: in no apparent distress.   Skin: normal, dry  Heart: regular rate and rhythm  Lungs: NLB on RA  Abdomen: soft, mild distension/edema, mildly tender RLQ around surgical incision and over kidney transplant. The wound is well approximated, no drainage and glued.  : ruvalcaba is present.  The genitalia is not edematous. Urine has scant amount of urine, + gross hematuria-cherry red.   Extremities: edema: absent.   Neurologic: awake, alert and oriented.  Tremor absent..     Data:   CMP  Recent Labs  Lab 04/24/17  0602 04/24/17  0134  04/23/17  1435  04/22/17  1230     --   --  137  < > 138   POTASSIUM 4.6 3.7  < > 4.6  < > 4.1   CHLORIDE 103  --   --  105  < > 98   CO2 26  --   --  22  < > 27   *  --   --  111*  < > 78   BUN 21  --   --  42*  < > 61*   CR 3.90*  --   --  6.48*  < > 8.57*   GFRESTIMATED 13*  --   --  7*  < > 5*   GFRESTBLACK 15*  --   --  9*  < > 6*   ARLINE 8.2*  --   --  7.9*  < > 10.7*   MAG 1.6  --   --  1.7  < >  --    PHOS 3.9  --   --  4.0  < >  --    ALBUMIN  --   --   --   --   --  4.8   BILITOTAL  --   --   --   --   --  0.5   ALKPHOS  --   --   --   --   --  56   AST  --   --   --   --   --  16   ALT  --   --   --   --   --  32   < > = values in this interval not displayed.  CBC  Recent Labs  Lab 04/24/17  0602 04/24/17 0134 04/23/17  1435 04/22/17  1230   HGB 7.3* 7.2*  < > 8.4*  < > 11.3*   WBC 11.0  --   --  10.8  < > 8.0   *  --   --  125*  < > 150   A1C  --   --   --   --   --  4.0*   < > = values in this interval not displayed.  COAGS  Recent Labs  Lab 04/22/17  1230   INR 1.04   PTT 29      Urinalysis  Recent Labs   Lab Test  04/22/17   1300  09/26/16   1539   COLOR  Light Yellow  Straw   APPEARANCE  Clear  Clear   URINEGLC  30*  50*   URINEBILI  Negative  Negative   URINEKETONE  Negative  Negative   SG  1.005  1.004   UBLD  Negative  Small*   URINEPH  8.0*  9.0*   PROTEIN  100*  30*   NITRITE  Negative  Negative   LEUKEST  Negative  Negative   RBCU  1  0   WBCU  <1  <1   UTPG  5.96*   --      Virology:  Hepatitis C Antibody   Date Value Ref Range Status   09/26/2016  NR Final    Nonreactive   Assay performance characteristics have not been established for newborns,   infants, and children

## 2017-04-24 NOTE — PROVIDER NOTIFICATION
RN paged Isra Lr MD at 1830 regarding low UOP, approximately 10-15 mL/hr for previous 3 hours and BP trending down from SBP in 130s to 110s. Vital signs otherwise stable and CVP 7-11. RN spoke with Isra Lr MD in person. Order placed for 1L NS bolus. RN acknowledged and administered. Continue to monitor and notify team with any concerns.

## 2017-04-24 NOTE — PLAN OF CARE
Problem: Goal Outcome Summary  Goal: Goal Outcome Summary  Outcome: No Change  Neuro: A&Ox4. Lethargy improved throughout shift. Patient comfortably resting and drifts off to sleep between cares.   Cardiac: SR. VSS. CVP 8-14.             Respiratory: Tolerating 1 LPM NC. RR 8-14, Capnography in use.   GI/: Huang patent with minimal UO, ~8 ML red urine/hour. Huang flushed x2 per MD. Resistance upon flushing and one dime sized clot was dislodged. Huang exchanged for larger size (18FR)  to prevent furhter clots. Continue to monitor UP closely.   Diet/appetite: Tolerating clear diet   Activity:  Resistant to repositioning due to pain.   Pain: Dilaudid PCA decreased from 0.2 to 0.1 due to increasing lethargy. Pain adequately controlled with PCA and scheduled tylenol.   Skin: Abdominal incision c/d/i. R OCTAVIO with serosang output.      Critical lab value: K 6.5 at 2018. Maroon cross cover notified (Jenifer Mckay MD). EKG, K cocktail, and emergent dialysis performed. Potassium recheck 3.7.       R: Continue with POC. Notify primary team with changes.

## 2017-04-24 NOTE — PROGRESS NOTES
"Surgery Post-Op Check  4/23/2017 10:00 PM     No acute events postoperatively. Sleepy in room, pain is well controlled. Denies nausea, vomiting, chest pain, shortness of breath. Small amounts of UOP postop, pink urine to Huang. No other complaints.      /74  Pulse 92  Temp 97.5  F (36.4  C) (Oral)  Resp 10  Ht 1.575 m (5' 2\")  Wt 53.1 kg (117 lb)  SpO2 96%  BMI 21.4 kg/m2    NAD, somnolent, arousable to voice  RRR, NLB  Abd soft, nondistended, appropriately tender to palpation. No rebound or guarding. Incision c/d/i. OCTAVIO serosanguinous,    UOP: 15-30mL/hr      A/P:  41 year old female POD 0 s/p DDKT, some low UOP but HDS postoperatively.     - NS bolus, monitor urine output  - Continue PCA overnight  -  BID  - Thymo given intra-op  - Bactrim, Valcyte ppx  - Otherwise continue cares per primary team    Isra Lr MD  PGY-1 General Surgery  [facility]              "

## 2017-04-24 NOTE — PROGRESS NOTES
Nephrology Initial Consult  April 24, 2017      Rosibel Willingham MRN:[id number] YOB: 1975  Date of Admission:4/22/2017  Primary care provider: No Ref-Primary, Physician  Requesting physician: Leanne Montelongo MD    ASSESSMENT AND RECOMMENDATIONS:   1.DDKT: with DGF, minimal Uout. No indication for HD today. Will follow up.  2.INS: No DSA. She will get Thyroglobulin and Solumedrol 250 mg QD today. Will c/e MMF, will hold Tacrolimus for now.   3.PPx: Rec CMV/EBV neg. Valcyte for 3 mo. Bactrim life long.   4.BP/ Volume status: net positive 2.5 L since admison. She is clinically euvolemic, with high CVP ( has LVH on the echo? ). Agree to decrease her Iv fluids to 40 cc/h. She feels nauseated, will control her nauseas and encourage po intake. If able to tolerate po intake, we can stop the iv fluids. We can try Lasix to 80 mg iv TID but if is not working we will recommand to stop it.  She was not on Metoprolol at home for the last 4 mo.   5.Anemia: Hg low 7.3. Will f/u on her Hg. No need for iron supplements.   6. CKD-MB: , will correct with improving kidney function.  Vit D is 29. Will f/u.   7.Hyperkalemia: resolved with HD. Switched to low K diet.     Recommendations were communicated to primary team during rounds.     Seen and discussed with Dr. Krzysztof Nielson MD   [phone]       REASON FOR CONSULT: DDKT on 4/23/17 with DGF     HISTORY OF PRESENT ILLNESS:  Rosibel Willingham is a 41 year old  F, with unknown cause of ESRD ( had a Bx showing advance chronicity, non-specific changes), was on HD since 6/2016,and she got DDKT 4/23/17. She has DGF, most likely ATN related to her high CIT of 26 hours, and renal vein reconstruction. She is oliguric. She use to have good Uout at home, not sure how high but she said it was normal. She had HD last night for elevated K.   No events overnight, still with low Uout, on iv fluids and Lasix. BP at goal. She has elevated CVP of 17 th, but dose  not look volume overload. She feels slightly nauseated, her pain is ok controled. U/S of the kidney, with good venous and arterial flow. No hydronephrosis.     PAST MEDICAL HISTORY:  Reviewed with patient on 2017     Past Medical History:   Diagnosis Date     Anemia of chronic kidney failure      End stage kidney disease (H)      Hypertension      Secondary hyperparathyroidism (H)        Past Surgical History:   Procedure Laterality Date     APPENDECTOMY       CREATE FISTULA ARTERIOVENOUS UPPER EXTREMITY       TRANSPLANT KIDNEY RECIPIENT  DONOR Right 2017    Procedure: TRANSPLANT KIDNEY RECIPIENT  DONOR;   Donor Kidney transplant   Ureteral stent placement;  Surgeon: Leanne Montelongo MD;  Location: [facility] OR        MEDICATIONS:  PTA Meds  Prior to Admission medications    Medication Sig Last Dose Taking? Auth Provider   amLODIPine (NORVASC) 10 MG tablet Take 10 mg by mouth daily 2017 at  Yes Reported, Patient   metoprolol (TOPROL-XL) 25 MG 24 hr tablet Take 25 mg by mouth daily 2017 at  Yes Reported, Patient      Current Meds    aspirin EC  325 mg Oral Daily     furosemide  80 mg Intravenous TID     sodium chloride (PF)  10 mL Intracatheter Q8H     valGANciclovir  450 mg Oral Once per day on      sulfamethoxazole-trimethoprim  1 tablet Oral Daily     mycophenolate  750 mg Oral BID IS     pantoprazole  40 mg Oral Daily     senna-docusate  2 tablet Oral At Bedtime     acetaminophen  1,000 mg Oral Q8H AGUSTIN     Infusion Meds    dextrose 5% and 0.9% NaCl 40 mL/hr at 17 1118       ALLERGIES:    Allergies   Allergen Reactions     Erythromycin Hives       REVIEW OF SYSTEMS:  A 10 point review of systems was negative except as noted above.    SOCIAL HISTORY:   Social History     Social History     Marital status:      Spouse name: N/A     Number of children: N/A     Years of education: N/A     Occupational History     Not on file.     Social History Main  "Topics     Smoking status: Former Smoker     Packs/day: 0.50     Years: 2.00     Types: Cigarettes     Quit date: 1998     Smokeless tobacco: Never Used     Alcohol use 1.2 oz/week     2 Standard drinks or equivalent per week     Drug use: No     Sexual activity: Not on file     Other Topics Concern     Not on file     Social History Narrative     Reviewed with patient     FAMILY MEDICAL HISTORY:   Family History   Problem Relation Age of Onset     Family History Negative Other      Reviewed with patient     PHYSICAL EXAM:   Temp  Av.2  F (36.8  C)  Min: 97.5  F (36.4  C)  Max: 98.6  F (37  C)      Pulse  Av.6  Min: 61  Max: 100 Resp  Av.2  Min: 9  Max: 18  SpO2  Av.9 %  Min: 93 %  Max: 100 %    CVP (mmHg): 9 mmHg  /81 (BP Location: Right arm)  Pulse 92  Temp 98.4  F (36.9  C)  Resp 16  Ht 1.575 m (5' 2\")  Wt 53.1 kg (117 lb)  SpO2 97%  BMI 21.4 kg/m2   Date 17 07 - 17 0659   Shift 6399-1416 3188-6288 3143-1522 24 Hour Total   I  N  T  A  K  E   P.O. 100   100    I.V. 20   20    Shift Total  (mL/kg) 120  (2.26)   120  (2.26)   O  U  T  P  U  T   Urine 30   30    Drains 155   155    Shift Total  (mL/kg) 185  (3.49)   185  (3.49)   Weight (kg) 53.07 53.07 53.07 53.07        Admit Weight: 52.9 kg (116 lb 10 oz)     GENERAL APPEARANCE: no acute  distress,  awake  HENT: NC/AT,  mouth  without ulcers or lesions  Lymphatics: no cervical or supraclavicular LAD  Pulmonary: lungs clear to auscultation with equal breath sounds bilaterally, no clubbing  CV: regular rhythm, normal rate, no rub   - JVP  No    - Edema trace non pitting   GI: soft, nontender, normal bowel sounds, no HSM   MS: no evidence of inflammation in joints, no muscle tenderness  :  Huang in, hematuria   SKIN: no rash, warm, dry, no cyanosis  NEURO: mentation intact and speech normal    LABS:   CMP  Recent Labs  Lab 17  0928 17  0602 17  0134 17  2014  17  1435 17  0617 " 04/23/17  0008 04/22/17  1230   NA  --  137  --   --   --  137 139 139 138   POTASSIUM 4.8 4.6 3.7 6.5*  < > 4.6 5.4* 3.7 4.1   CHLORIDE  --  103  --   --   --  105 106 105 98   CO2  --  26  --   --   --  22 23 26 27   ANIONGAP  --  8  --   --   --  10 10 8 12   GLC  --  158*  --   --   --  111* 93 95 78   BUN  --  21  --   --   --  42* 43* 38* 61*   CR  --  3.90*  --   --   --  6.48* 6.52* 5.02* 8.57*   GFRESTIMATED  --  13*  --   --   --  7* 7* 9* 5*   GFRESTBLACK  --  15*  --   --   --  9* 8* 11* 6*   ARLINE  --  8.2*  --   --   --  7.9* 8.5 8.3* 10.7*   MAG  --  1.6  --   --   --  1.7  --  1.9  --    PHOS  --  3.9  --   --   --  4.0  --  2.5  --    PROTTOTAL  --   --   --   --   --   --   --   --  8.6   ALBUMIN  --   --   --   --   --   --   --   --  4.8   BILITOTAL  --   --   --   --   --   --   --   --  0.5   ALKPHOS  --   --   --   --   --   --   --   --  56   AST  --   --   --   --   --   --   --   --  16   ALT  --   --   --   --   --   --   --   --  32   < > = values in this interval not displayed.  CBC  Recent Labs  Lab 04/24/17  0928 04/24/17  0602 04/24/17  0134 04/23/17 2014 04/23/17  1435 04/23/17  0008 04/22/17  1230   HGB 7.8* 7.3* 7.2* 7.8*  < > 8.4* 9.2* 11.3*   WBC  --  11.0  --   --   --  10.8 3.7* 8.0   RBC  --  2.52*  --   --   --  2.92* 3.11* 3.89   HCT  --  23.2*  --   --   --  26.8* 28.1* 35.7   MCV  --  92  --   --   --  92 90 92   MCH  --  29.0  --   --   --  28.8 29.6 29.0   MCHC  --  31.5  --   --   --  31.3* 32.7 31.7   RDW  --  16.0*  --   --   --  16.0* 15.9* 16.3*   PLT  --  106*  --   --   --  125* 103* 150   < > = values in this interval not displayed.  INR  Recent Labs  Lab 04/22/17  1230   INR 1.04   PTT 29     ABGNo lab results found in last 7 days.   URINE STUDIES  Recent Labs   Lab Test  04/22/17   1300  09/26/16   1539   COLOR  Light Yellow  Straw   APPEARANCE  Clear  Clear   URINEGLC  30*  50*   URINEBILI  Negative  Negative   URINEKETONE  Negative  Negative   SG  1.005   1.004   UBLD  Negative  Small*   URINEPH  8.0*  9.0*   PROTEIN  100*  30*   NITRITE  Negative  Negative   LEUKEST  Negative  Negative   RBCU  1  0   WBCU  <1  <1     Recent Labs   Lab Test  04/22/17   1300   UTPG  5.96*     PTH  Recent Labs   Lab Test  04/23/17   0617   PTHI  149*     IRON STUDIES  Recent Labs   Lab Test  04/23/17   0617   IRON  47   FEB  192*   IRONSAT  25   MALLORIE  71       IMAGING:  All imaging studies reviewed by me.     Armando Nielson MD    Attestation:  This patient has been seen and evaluated by me, Sid Blankenship MD.  I have reviewed the note and agree with plan of care as documented by the fellow.

## 2017-04-24 NOTE — PROGRESS NOTES
HEMODIALYSIS TREATMENT NOTE    Date: 4/24/2017  Time: 2:00 AM    Data:   Pre Wt:  kg 53.1   Desired Wt: kg 53.1   Post Wt: kg     Approximate (Net) Weight Removed: kg 0 Kg   Vascular Access Status: Access/lines visible and secure, Prescribed BFR achieved   Dialyzer Rinse: Streaked, Light   Total Blood Volume Processed:     Total Dialysis (Treatment) Time: 2.5    Interventions:  2.5 hours of HD with no fluid pulled/.    Assessment:  Post op kidney transplant with K+ of 6.5.     Plan:    Per renal

## 2017-04-25 ENCOUNTER — APPOINTMENT (OUTPATIENT)
Dept: NUCLEAR MEDICINE | Facility: CLINIC | Age: 42
DRG: 652 | End: 2017-04-25
Attending: PHYSICIAN ASSISTANT
Payer: COMMERCIAL

## 2017-04-25 LAB
ANION GAP SERPL CALCULATED.3IONS-SCNC: 11 MMOL/L (ref 3–14)
BASOPHILS # BLD AUTO: 0 10E9/L (ref 0–0.2)
BASOPHILS NFR BLD AUTO: 0 %
BUN SERPL-MCNC: 43 MG/DL (ref 7–30)
CALCIUM SERPL-MCNC: 8.3 MG/DL (ref 8.5–10.1)
CHLORIDE SERPL-SCNC: 99 MMOL/L (ref 94–109)
CO2 SERPL-SCNC: 22 MMOL/L (ref 20–32)
CREAT SERPL-MCNC: 6.09 MG/DL (ref 0.52–1.04)
DIFFERENTIAL METHOD BLD: ABNORMAL
EOSINOPHIL # BLD AUTO: 0 10E9/L (ref 0–0.7)
EOSINOPHIL NFR BLD AUTO: 0 %
ERYTHROCYTE [DISTWIDTH] IN BLOOD BY AUTOMATED COUNT: 15.9 % (ref 10–15)
GFR SERPL CREATININE-BSD FRML MDRD: 8 ML/MIN/1.7M2
GLUCOSE SERPL-MCNC: 135 MG/DL (ref 70–99)
HCT VFR BLD AUTO: 22.6 % (ref 35–47)
HGB BLD-MCNC: 7.1 G/DL (ref 11.7–15.7)
IMM GRANULOCYTES # BLD: 0 10E9/L (ref 0–0.4)
IMM GRANULOCYTES NFR BLD: 0.3 %
INTERPRETATION ECG - MUSE: NORMAL
INTERPRETATION ECG - MUSE: NORMAL
LYMPHOCYTES # BLD AUTO: 0.3 10E9/L (ref 0.8–5.3)
LYMPHOCYTES NFR BLD AUTO: 2.5 %
MAGNESIUM SERPL-MCNC: 1.8 MG/DL (ref 1.6–2.3)
MCH RBC QN AUTO: 28.6 PG (ref 26.5–33)
MCHC RBC AUTO-ENTMCNC: 31.4 G/DL (ref 31.5–36.5)
MCV RBC AUTO: 91 FL (ref 78–100)
MONOCYTES # BLD AUTO: 0.4 10E9/L (ref 0–1.3)
MONOCYTES NFR BLD AUTO: 3.7 %
NEUTROPHILS # BLD AUTO: 10.3 10E9/L (ref 1.6–8.3)
NEUTROPHILS NFR BLD AUTO: 93.5 %
NRBC # BLD AUTO: 0 10*3/UL
NRBC BLD AUTO-RTO: 0 /100
PHOSPHATE SERPL-MCNC: 6.5 MG/DL (ref 2.5–4.5)
PLATELET # BLD AUTO: 109 10E9/L (ref 150–450)
POTASSIUM SERPL-SCNC: 5.1 MMOL/L (ref 3.4–5.3)
RBC # BLD AUTO: 2.48 10E12/L (ref 3.8–5.2)
SODIUM SERPL-SCNC: 131 MMOL/L (ref 133–144)
WBC # BLD AUTO: 11 10E9/L (ref 4–11)

## 2017-04-25 PROCEDURE — 85025 COMPLETE CBC W/AUTO DIFF WBC: CPT | Performed by: TRANSPLANT SURGERY

## 2017-04-25 PROCEDURE — 25000128 H RX IP 250 OP 636: Performed by: HOSPITALIST

## 2017-04-25 PROCEDURE — 25000132 ZZH RX MED GY IP 250 OP 250 PS 637: Performed by: TRANSPLANT SURGERY

## 2017-04-25 PROCEDURE — 83735 ASSAY OF MAGNESIUM: CPT | Performed by: TRANSPLANT SURGERY

## 2017-04-25 PROCEDURE — 12000026 ZZH R&B TRANSPLANT

## 2017-04-25 PROCEDURE — 25000132 ZZH RX MED GY IP 250 OP 250 PS 637: Performed by: PHYSICIAN ASSISTANT

## 2017-04-25 PROCEDURE — 34300033 ZZH RX 343: Performed by: SURGERY

## 2017-04-25 PROCEDURE — 25000128 H RX IP 250 OP 636: Performed by: PHYSICIAN ASSISTANT

## 2017-04-25 PROCEDURE — 36592 COLLECT BLOOD FROM PICC: CPT | Performed by: TRANSPLANT SURGERY

## 2017-04-25 PROCEDURE — 78707 K FLOW/FUNCT IMAGE W/O DRUG: CPT

## 2017-04-25 PROCEDURE — A9562 TC99M MERTIATIDE: HCPCS | Performed by: SURGERY

## 2017-04-25 PROCEDURE — 84100 ASSAY OF PHOSPHORUS: CPT | Performed by: TRANSPLANT SURGERY

## 2017-04-25 PROCEDURE — 80048 BASIC METABOLIC PNL TOTAL CA: CPT | Performed by: TRANSPLANT SURGERY

## 2017-04-25 PROCEDURE — 25000132 ZZH RX MED GY IP 250 OP 250 PS 637: Performed by: SURGERY

## 2017-04-25 PROCEDURE — 90937 HEMODIALYSIS REPEATED EVAL: CPT

## 2017-04-25 PROCEDURE — 25000131 ZZH RX MED GY IP 250 OP 636 PS 637: Performed by: TRANSPLANT SURGERY

## 2017-04-25 RX ORDER — BISACODYL 10 MG
10 SUPPOSITORY, RECTAL RECTAL ONCE
Status: DISCONTINUED | OUTPATIENT
Start: 2017-04-25 | End: 2017-04-26 | Stop reason: CLARIF

## 2017-04-25 RX ORDER — METHYLPREDNISOLONE SODIUM SUCCINATE 125 MG/2ML
100 INJECTION, POWDER, LYOPHILIZED, FOR SOLUTION INTRAMUSCULAR; INTRAVENOUS ONCE
Status: COMPLETED | OUTPATIENT
Start: 2017-04-25 | End: 2017-04-25

## 2017-04-25 RX ORDER — DIPHENHYDRAMINE HCL 12.5MG/5ML
25-50 LIQUID (ML) ORAL ONCE
Status: COMPLETED | OUTPATIENT
Start: 2017-04-25 | End: 2017-04-25

## 2017-04-25 RX ORDER — SULFAMETHOXAZOLE AND TRIMETHOPRIM 400; 80 MG/1; MG/1
1 TABLET ORAL
Status: DISCONTINUED | OUTPATIENT
Start: 2017-04-26 | End: 2017-04-27 | Stop reason: HOSPADM

## 2017-04-25 RX ORDER — DIPHENHYDRAMINE HCL 25 MG
25-50 CAPSULE ORAL ONCE
Status: COMPLETED | OUTPATIENT
Start: 2017-04-25 | End: 2017-04-25

## 2017-04-25 RX ORDER — ACETAMINOPHEN 325 MG/1
650 TABLET ORAL ONCE
Status: DISCONTINUED | OUTPATIENT
Start: 2017-04-25 | End: 2017-04-27 | Stop reason: CLARIF

## 2017-04-25 RX ADMIN — ACETAMINOPHEN 1000 MG: 500 TABLET, FILM COATED ORAL at 04:27

## 2017-04-25 RX ADMIN — ACETAMINOPHEN 1000 MG: 500 TABLET, FILM COATED ORAL at 14:27

## 2017-04-25 RX ADMIN — Medication: at 16:19

## 2017-04-25 RX ADMIN — ACETAMINOPHEN 1000 MG: 500 TABLET, FILM COATED ORAL at 18:56

## 2017-04-25 RX ADMIN — DIPHENHYDRAMINE HYDROCHLORIDE 25 MG: 25 CAPSULE ORAL at 20:01

## 2017-04-25 RX ADMIN — OXYCODONE HYDROCHLORIDE 5 MG: 5 TABLET ORAL at 10:52

## 2017-04-25 RX ADMIN — ASPIRIN 325 MG: 325 TABLET, DELAYED RELEASE ORAL at 08:56

## 2017-04-25 RX ADMIN — MYCOPHENOLATE MOFETIL 750 MG: 250 CAPSULE ORAL at 08:56

## 2017-04-25 RX ADMIN — SODIUM CHLORIDE 250 ML: 9 INJECTION, SOLUTION INTRAVENOUS at 16:01

## 2017-04-25 RX ADMIN — PANTOPRAZOLE SODIUM 40 MG: 40 TABLET, DELAYED RELEASE ORAL at 08:56

## 2017-04-25 RX ADMIN — SENNOSIDES AND DOCUSATE SODIUM 2 TABLET: 8.6; 5 TABLET ORAL at 21:37

## 2017-04-25 RX ADMIN — Medication 8 MILLICURIE: at 15:00

## 2017-04-25 RX ADMIN — METHYLPREDNISOLONE SODIUM SUCCINATE 100 MG: 125 INJECTION, POWDER, LYOPHILIZED, FOR SOLUTION INTRAMUSCULAR; INTRAVENOUS at 20:01

## 2017-04-25 RX ADMIN — MYCOPHENOLATE MOFETIL 750 MG: 250 CAPSULE ORAL at 20:32

## 2017-04-25 RX ADMIN — SULFAMETHOXAZOLE AND TRIMETHOPRIM 1 TABLET: 400; 80 TABLET ORAL at 08:56

## 2017-04-25 RX ADMIN — FUROSEMIDE 80 MG: 10 INJECTION, SOLUTION INTRAVENOUS at 08:58

## 2017-04-25 RX ADMIN — SODIUM CHLORIDE 500 ML: 9 INJECTION, SOLUTION INTRAVENOUS at 16:00

## 2017-04-25 RX ADMIN — ANTI-THYMOCYTE GLOBULIN (RABBIT) 125 MG: 5 INJECTION, POWDER, LYOPHILIZED, FOR SOLUTION INTRAVENOUS at 20:33

## 2017-04-25 NOTE — PLAN OF CARE
Problem: Goal Outcome Summary  Goal: Goal Outcome Summary  Outcome: Improving  D: Rosibel's VSS, mild pain, no c/o nausea now.  Says she lacked sleep, and so she slept in in the morning.  No BM yet, but passing gas.  I:   Allowed her to sleep some in the morning; a suppository was ordered, but Rosibel refused it, saying she would drink prune juice later on ( states he will order ir for her later on or go buy some).  A:  Rosibel states that her nap helped her a lot, and she seemed more cheerful afterward.  P:  Rosibel is going to a renogram now, and then she will go to dialysis from there; 7A floor nurse will start Thymo after dialysis (dose already available in 7A med room).

## 2017-04-25 NOTE — PROGRESS NOTES
Transplant Surgery  Inpatient Daily Progress Note  04/25/2017    Assessment & Plan: Rosibel Willingham is a 41 year old female with ESRD of unknown origin on dialysis since July 2016. Dry weight 52kg. Patient continued to make her normal UO while on HD. S/p DDKT with ureteral stent placement on 4/23/2017. CIT 25hrs. HD on POD 0 due to hyperkalemia.     Graft function: DGF. HD on POD 0 due to hyperkalemia.  cc yesterday plus 2 unrecorded voids and 75 cc since midnight.  (Huang was removed and replaced with 18 Fr due to minimal urine output and concern that it might be blocked.  Expected ATN due to long CIT but unclear etiology for native kidneys not producing urine post transplant. Huang appears patent s/p flushing. Check PVR.  Fluid creatinine in drain 4.3. Possible due to lower BP, normal BP for patient 140s-160s per her report.   US transplant kidney showing good flow and no obstruction, will get renogram today to assess for baseline, ATN.  Pain: scheduled tylenol. Oxycodone PRN.   Immunosuppression management:   Thymo induction: 100 mg intra-op, 100 mg POD 1, will give 125 mg today for a total of 6.14 mg/kg  Solu-medrol 500 mg intra-op, 250 mg POD 1, 100 mg today.  Mycophenolate 750 mg BID   Tacrolimus, hold start of CNI due to DGF.   DSA in process  Complexity of management:High. Contributing factors: induction, DGF  Hematology: Hbg 11.3 pre op, decreased to 9.2 immediately post op. Hbg now 7.1 from 7.6.  .  mg started due to venous reconstruction.   Cardiorespiratory:   HTN: SBPs 116-147s.  Patient on amlodipine 5 mg PTA. PTA Metoprolol discontinued about 4 months ago. Stop metoprolol IV. Monitor.   Stable on RA  GI/Nutrition: PRABHAKAR, low k.   Endocrine: Hyperglycemia secondary to steroids. Monitor.  Fluid/Electrolytes: MIVF: Off due to decreased urine output.  Lasix 80 mg IV TID.  K 5.1 this AM, will discuss with nephrology.  Low K diet.   CKD BMD: . Vitamin D 29. Monitor.   : Huang to  "remain due to new surgical anastomosis  Infectious disease: afebrile.   Prophylaxis: DVT PCDs. ID ppx: Valcyte (CMV R+D+, EBV R+D+) x 12 weeks, Bactrim  Disposition: 7A     Medical Decision Making: High  Subsequent visit 24896 (high level decision making)    OG/Fellow/Resident Provider: Tawny Macias PA-C    Faculty: Nicolas Winkler M.D., Ph.D.  _________________________________________________________________  Transplant History: Admitted 4/22/2017 for kidney transplant.  4/23/2017 (Kidney), Postoperative day: 2     Interval History: History is obtained from the patient  Overnight events: Pain control improved per patient.  Had some oral intake.  Walked around in room, sat up in chair.  Abdomen feels distended.    ROS:   A 10-point review of systems was negative except as noted above.    Meds:    acetaminophen  650 mg Oral Once     diphenhydrAMINE  25-50 mg Oral Once    Or     diphenhydrAMINE  25-50 mg Per NG tube Once     methylPREDNISolone  100 mg Intravenous Once     anti-thymocyte globulin  125 mg Intravenous Central line Once     aspirin EC  325 mg Oral Daily     furosemide  80 mg Intravenous TID     sodium chloride (PF)  10 mL Intracatheter Q8H     valGANciclovir  450 mg Oral Once per day on Mon Thu     sulfamethoxazole-trimethoprim  1 tablet Oral Daily     mycophenolate  750 mg Oral BID IS     pantoprazole  40 mg Oral Daily     senna-docusate  2 tablet Oral At Bedtime     acetaminophen  1,000 mg Oral Q8H AGUSTIN       Physical Exam:     Admit Weight: 52.9 kg (116 lb 10 oz)    Current vitals:   BP (!) 149/93 (BP Location: Right arm)  Pulse 89  Temp 98.4  F (36.9  C) (Oral)  Resp 18  Ht 1.575 m (5' 2\")  Wt 59.4 kg (131 lb)  SpO2 90%  BMI 23.96 kg/m2        Vital sign ranges:    Temp:  [98  F (36.7  C)-98.8  F (37.1  C)] 98.4  F (36.9  C)  Pulse:  [89-92] 89  Heart Rate:  [76-95] 89  Resp:  [11-18] 18  BP: (119-149)/(75-94) 149/93  SpO2:  [90 %-96 %] 90 %  Patient Vitals for the past 24 hrs:   BP Temp Temp src " Pulse Heart Rate Resp SpO2 Weight   04/25/17 0756 (!) 149/93 98.4  F (36.9  C) Oral 89 89 18 90 % -   04/25/17 0430 134/88 98.7  F (37.1  C) Oral - 95 18 93 % -   04/24/17 2300 140/86 98.6  F (37  C) Oral 92 - 18 93 % -   04/24/17 2030 136/79 98.8  F (37.1  C) Oral - 88 16 95 % -   04/24/17 1556 122/75 98.8  F (37.1  C) Oral - 88 16 95 % -   04/24/17 1430 - - - - - 11 94 % -   04/24/17 1345 - - - - - - 96 % -   04/24/17 1300 (!) 135/94 - - - 80 16 - -   04/24/17 1200 130/83 98  F (36.7  C) - - 76 16 - -   04/24/17 1100 146/81 98.4  F (36.9  C) - - 85 16 - -   04/24/17 1000 119/78 - - - 77 16 - 59.4 kg (131 lb)     General Appearance: in no apparent distress.   Skin: normal, dry  Heart: regular rate and rhythm  Lungs: NLB on RA  Abdomen: soft, mild distension/edema, mildly tender RLQ around surgical incision and over kidney transplant. The wound is well approximated, no drainage and glued.  : ruvalcaba is present.  The genitalia is not edematous. Urine has scant amount of urine, + gross hematuria-cherry red- no clots seen.   Extremities: edema: absent.   Neurologic: awake, alert and oriented. Tremor absent..     Data:   CMP  Recent Labs  Lab 04/25/17  0606 04/24/17  1309 04/24/17  1015  04/24/17  0602  04/22/17  1230   *  --   --   --  137  < > 138   POTASSIUM 5.1 4.8  --   < > 4.6  < > 4.1   CHLORIDE 99  --   --   --  103  < > 98   CO2 22  --   --   --  26  < > 27   *  --   --   --  158*  < > 78   BUN 43*  --   --   --  21  < > 61*   CR 6.09*  --   --   --  3.90*  < > 8.57*   GFRESTIMATED 8*  --   --   --  13*  < > 5*   GFRESTBLACK 9*  --   --   --  15*  < > 6*   ARLINE 8.3*  --   --   --  8.2*  < > 10.7*   MAG 1.8  --   --   --  1.6  < >  --    PHOS 6.5*  --   --   --  3.9  < >  --    ALBUMIN  --   --   --   --   --   --  4.8   BILITOTAL  --   --   --   --   --   --  0.5   ALKPHOS  --   --   --   --   --   --  56   AST  --   --   --   --   --   --  16   ALT  --   --   --   --   --   --  32   FCREAT  --   --   4.3  --   --   --   --    < > = values in this interval not displayed.  CBC  Recent Labs  Lab 04/25/17  0606 04/24/17  1309  04/24/17  0602  04/22/17  1230   HGB 7.1* 7.6*  < > 7.3*  < > 11.3*   WBC 11.0  --   --  11.0  < > 8.0   *  --   --  106*  < > 150   A1C  --   --   --   --   --  4.0*   < > = values in this interval not displayed.  COAGS    Recent Labs  Lab 04/22/17  1230   INR 1.04   PTT 29      Urinalysis  Recent Labs   Lab Test  04/22/17   1300  09/26/16   1539   COLOR  Light Yellow  Straw   APPEARANCE  Clear  Clear   URINEGLC  30*  50*   URINEBILI  Negative  Negative   URINEKETONE  Negative  Negative   SG  1.005  1.004   UBLD  Negative  Small*   URINEPH  8.0*  9.0*   PROTEIN  100*  30*   NITRITE  Negative  Negative   LEUKEST  Negative  Negative   RBCU  1  0   WBCU  <1  <1   UTPG  5.96*   --      Virology:  Hepatitis C Antibody   Date Value Ref Range Status   04/22/2017  NR Final    Nonreactive   Assay performance characteristics have not been established for newborns,   infants, and children

## 2017-04-25 NOTE — PLAN OF CARE
Problem: Goal Outcome Summary  Goal: Goal Outcome Summary  Outcome: No Change  7760-7966: Afebrile. VSS on RA. Pt complaining of some abdominal discomfort, scheduled dose of Tylenol administered. Pt on clear liquid diet, pt was able to eat small amounts of dinner without any complaints of nausea. Triple Lumen IJ, all three ports SL, patients maintenance IV fluids D/C this shift. PIV R Forearm SL. Huang catheter with small amounts of pink/red urine output, 100 mL UOP emptied this shift. Pt is on hemodialysis, last episode last night. OCTAVIO drain on R side of abdomen putting out moderate amounts of serosanguinous drainage, 190 mL emptied this shift. No BM since surgery and pt is not passing flatus, scheduled dose of Senna administered. Pt up to the chair this shift and was able to walk around her room, up with SBA. Call light in reach. Will continue to monitor and follow plan of care.

## 2017-04-25 NOTE — PLAN OF CARE
"Problem: Goal Outcome Summary  Goal: Goal Outcome Summary  Outcome: No Change  /88 (BP Location: Right arm)  Pulse 92  Temp 98.7  F (37.1  C) (Oral)  Resp 18  Ht 1.575 m (5' 2\")  Wt 59.4 kg (131 lb)  SpO2 93%  BMI 23.96 kg/m2     Patient VSS on RA; afebrile. Abdominal/incisional pain controlled with scheduled medications. Denies nausea on clear liquid diet. Triple lumen IJ and PIV saline locked. Resident Eliecer Rosario notified of low urine output (75 mL), and upon consult with fellow decided against further intervention. Urine red in ruvalcaba bag. OCTAVIO with 100 serosanguineous output. No BM this shift. Incision CDI. Med card, lab book updated in room. Continue to monitor and notify MD with changes or concerns.       "

## 2017-04-25 NOTE — PROGRESS NOTES
"  Nephrology Progress Note  04/25/2017         Assessment & Recommendations:   Rosibel Willingham is a 41 year old year old female with ESRD of unknow origin by Bx, was on HD for 6 mo, now s/p DDKT with DGF, most likely due to long CIT of 26 hours.     1.DDKT: with DGF, minimal Uout, volume up, with up-trending K. Will do HD today for volume and K management . 2 l UF goal.   2.INS: No DSA. She will get Thyroglobulin and Solumedrol 125 mg QD today. Will continue with MMF, we would recommend to start Tacrolimus today.   3.PPx: Rec CMV/EBV neg. Valcyte for 3 mo. Bactrim lifelong.   4.BP/ Volume status: she is 7 kg up since admsion as per yesterday weight. Will do HD today, will remove 2 -2.5 L. Keep -140s. I thing she has severe ATN, so is unlikely Lasix will work. We can hold Lasix until she will start making some more urine. Fluid restriction.   5.Anemia: Hg 7.1 low, stable, with some hemodilution component. No need for iron supplements.   6. CKD-MB: , will correct with improving kidney function.  Vit D is 29. Will f/u.   7.Hyperkalemia: will do HD today.She will continue with low K diet.   8.Hyponateremia: secondary to fluid intake. She should be on fluid restriction.     Recommendations were communicated to primary team during rounds.     Seen and discussed with Dr. Krzysztof Nielson MD   [phone]     Interval History :   In the last 24 hours Rosibel Willingham remained oliguric, with up-trending Cr. She is more volume up, and she started experiencing SOB. She start passing gas today. No other complains.   Review of Systems:   4 pt ROS reviewed alone and are all negative     Physical Exam:   I/O last 3 completed shifts:  In: 1093 [P.O.:460; I.V.:633]  Out: 660 [Urine:205; Drains:450; Other:5]   BP (!) 149/93 (BP Location: Right arm)  Pulse 89  Temp 98.4  F (36.9  C) (Oral)  Resp 18  Ht 1.575 m (5' 2\")  Wt 59.4 kg (131 lb)  SpO2 90%  BMI 23.96 kg/m2     GENERAL APPEARANCE: sitting in bed, " feels tired   HENT: mouth without ulcers or lesions  PULM: lungs crackles BB,   CV: regular rhythm, normal rate, no rub     -JVP  No      -edema generalized trace edema    GI: soft, tender over the surgical area  NEURO:  no asterixis     Labs:   All labs reviewed by me  Electrolytes/Renal -   Recent Labs   Lab Test  04/25/17   0606  04/24/17   1309  04/24/17   0928  04/24/17   0602   04/23/17   1435   NA  131*   --    --   137   --   137   POTASSIUM  5.1  4.8  4.8  4.6   < >  4.6   CHLORIDE  99   --    --   103   --   105   CO2  22   --    --   26   --   22   BUN  43*   --    --   21   --   42*   CR  6.09*   --    --   3.90*   --   6.48*   GLC  135*   --    --   158*   --   111*   ARLINE  8.3*   --    --   8.2*   --   7.9*   MAG  1.8   --    --   1.6   --   1.7   PHOS  6.5*   --    --   3.9   --   4.0    < > = values in this interval not displayed.       CBC -   Recent Labs   Lab Test  04/25/17   0606  04/24/17   1309  04/24/17   0928  04/24/17   0602   04/23/17   1435   WBC  11.0   --    --   11.0   --   10.8   HGB  7.1*  7.6*  7.8*  7.3*   < >  8.4*   PLT  109*   --    --   106*   --   125*    < > = values in this interval not displayed.       LFTs -   Recent Labs   Lab Test  04/22/17   1230  09/26/16   0700   ALKPHOS  56  50   BILITOTAL  0.5  0.4   ALT  32  14   AST  16  11   PROTTOTAL  8.6  7.1   ALBUMIN  4.8  3.8       Iron Panel -   Recent Labs   Lab Test  04/23/17   0617   IRON  47   IRONSAT  25   MALLORIE  71       Current Medications:    acetaminophen  650 mg Oral Once     diphenhydrAMINE  25-50 mg Oral Once    Or     diphenhydrAMINE  25-50 mg Per NG tube Once     methylPREDNISolone  100 mg Intravenous Once     anti-thymocyte globulin  125 mg Intravenous Central line Once     bisacodyl  10 mg Rectal Once     [START ON 4/26/2017] sulfamethoxazole-trimethoprim  1 tablet Oral Q Mon Wed Fri AM     sodium chloride 0.9%  250 mL Intravenous Once in dialysis     sodium chloride 0.9%  500 mL Hemodialysis Machine Once      gelatin absorbable  1 each Topical During Hemodialysis (from stock)     - MEDICATION INSTRUCTIONS -   Does not apply Once     aspirin EC  325 mg Oral Daily     sodium chloride (PF)  10 mL Intracatheter Q8H     valGANciclovir  450 mg Oral Once per day on Mon Thu     mycophenolate  750 mg Oral BID IS     pantoprazole  40 mg Oral Daily     senna-docusate  2 tablet Oral At Bedtime     acetaminophen  1,000 mg Oral Q8H [facility]        Armando Nielson MD     Attestation:  This patient has been seen and evaluated by me, Sid Blankenship MD.  I have reviewed the note and agree with plan of care as documented by the fellow.

## 2017-04-25 NOTE — PROVIDER NOTIFICATION
Just now verbally notified CAMILLA Hector that Rosibel's sodium pfngu=087, her hemoglobin is down to 7.1, and her magnesium level=1.8.

## 2017-04-25 NOTE — PROGRESS NOTES
Care Coordinator- Discharge Planning     Admission Date/Time:  4/22/2017  Attending MD:  Leanne Montelongo MD     Date:  Date of initial CC assessment:  4.25.17  Chart reviewed, discussed with interdisciplinary team.   Patient was admitted for:   1. Kidney transplant candidate    2. Kidney replaced by transplant    Rosibel Willingham is a 41 year old female with ESRD of unknown origin on dialysis since July 2016. Dry weight 52kg. Patient continued to make her normal UO while on HD. S/p DDKT with ureteral stent placement on 4/23/2017. CIT 25hrs. HD on POD 0 due to hyperkalemia--note per CAMILLA Kirk, today.  Plan for d/c in approx 2 days with OP HD at [facility] and ATC f/u.          Assessment  Concerns with insurance coverage for discharge needs: None.  Current Living Situation: Patient lives with spouse.  Support System: Supportive  Services Involved: Dialysis Services and Home Care  Transportation: Car and Family or Friend will provide  Barriers to Discharge: post op recovery   I met with the pt, and her spouse Ian, in their room, and explained my role and went over discharge routine. Pt and Ian are very pleasant. They are from [place], [place] and have small children. They plan to stay locally with Rosibel's sister in [facility]--pt has stayed with her before, and has established care with a local clinic (if she needs them-----I will get name later).  I explained that they return to ATC clinic the next morning @ 0645 for lab draw and then go to clinic and first visit is approx. 6 hour visit with subsequent visits approx 2-3 hours long. On lab draw days (ATC and HHN visit days M-W-F), they wait to take their immunosuppression pills until their labs are drawn and then they take them (bring them with to the ATC clinic). If you will have a HHN visit, they do not begin visits until ATC clinic appointments are complete and then the ATC nurse calls them to let them know what day to begin. I have given choice and they  chose [facility] and I called referral to rudi Thurman [phone] [facility] agency @ Ph:[phone] /F:[phone]. I will obtain local address and phone #.  Pt's Outpatient Care Coordinator is: Chinyere Joejavier (will send her this note via intritrue today). Pt is to call OP CC with questions or concerns and notify them if they develop vomiting or diarrhea right away, as pt may need to be readmitted to determine cause and get IV immunosuppression/ or hydration.  Pt had OP HD at [facility] (see below). I explained that per advisement of transplant nephrologist: Dr Gemini Nielson to arrange OP HD locally at [facility] as she should be followed by a transplant nephrologist and she agrees. I called Gemma at [phone] and she has M-W-F @ 12:30pm available and can begin on Friday,4/28 she needs records from [place] by tomorrow 4/26 and from me by today. I have called Lorraine in .[phone] and they have discharged her as she had her KT--she is printing and will fax records to [facility] today and she will call Gemma today. I have fax'd facesheet, neph and progress notes, 2 HD run notes and hep B labs, CXR and EKG to Gemma.  No lifting over 10 lbs x 6 weeks and no driving for at least 2 weeks, and then have to be off narcotics and reflexes back to normal. Stay hydrated with 1-2 liters of water QD. Avoid ill people and frequent handwashing to prevent illness.  PCP:____[place], [place]:  [facility] transplant nephrologist: Dr Sid Blankenship and Fellow: Gemini Nielson  Transplant Surgeon: Dr Leanne Montelongo  7A SW: Shahida Diehl--pt has restricted pharmacy: [facility].    Coordination of Care and Referrals: Provided patient/family with options for Dialysis Services and Home Care.      Plan  Anticipated Discharge Date:  4.27.17  Anticipated Discharge Plan:  F/U in ATC the morning after d/c--OP HD at [facility]  [place]        6/4/2016  [facility] T-TH-SAT PM  Start per [address] [facility] [place]          Current Dialysis Center Information   [facility] [place] [address] Phone #: [phone]   Contact: N/A KARUNA YANG  [id number]             CTS Handoff completed:  YES    Swapna Medina RN

## 2017-04-26 LAB
ABO + RH BLD: NORMAL
ABO + RH BLD: NORMAL
ANION GAP SERPL CALCULATED.3IONS-SCNC: 12 MMOL/L (ref 3–14)
BASOPHILS # BLD AUTO: 0 10E9/L (ref 0–0.2)
BASOPHILS NFR BLD AUTO: 0 %
BLD GP AB SCN SERPL QL: NORMAL
BLD PROD TYP BPU: NORMAL
BLD UNIT ID BPU: 0
BLOOD BANK CMNT PATIENT-IMP: NORMAL
BLOOD PRODUCT CODE: NORMAL
BPU ID: NORMAL
BUN SERPL-MCNC: 30 MG/DL (ref 7–30)
CALCIUM SERPL-MCNC: 8.4 MG/DL (ref 8.5–10.1)
CHLORIDE SERPL-SCNC: 100 MMOL/L (ref 94–109)
CO2 SERPL-SCNC: 24 MMOL/L (ref 20–32)
CREAT SERPL-MCNC: 4.29 MG/DL (ref 0.52–1.04)
DIFFERENTIAL METHOD BLD: ABNORMAL
EOSINOPHIL # BLD AUTO: 0 10E9/L (ref 0–0.7)
EOSINOPHIL NFR BLD AUTO: 0 %
ERYTHROCYTE [DISTWIDTH] IN BLOOD BY AUTOMATED COUNT: 15.7 % (ref 10–15)
GFR SERPL CREATININE-BSD FRML MDRD: 11 ML/MIN/1.7M2
GLUCOSE SERPL-MCNC: 130 MG/DL (ref 70–99)
HCT VFR BLD AUTO: 20.5 % (ref 35–47)
HGB BLD-MCNC: 6.5 G/DL (ref 11.7–15.7)
HGB BLD-MCNC: 8.3 G/DL (ref 11.7–15.7)
IMM GRANULOCYTES # BLD: 0 10E9/L (ref 0–0.4)
IMM GRANULOCYTES NFR BLD: 0 %
LYMPHOCYTES # BLD AUTO: 0.1 10E9/L (ref 0.8–5.3)
LYMPHOCYTES NFR BLD AUTO: 2.5 %
MAGNESIUM SERPL-MCNC: 1.8 MG/DL (ref 1.6–2.3)
MCH RBC QN AUTO: 28.6 PG (ref 26.5–33)
MCHC RBC AUTO-ENTMCNC: 31.7 G/DL (ref 31.5–36.5)
MCV RBC AUTO: 90 FL (ref 78–100)
MONOCYTES # BLD AUTO: 0.2 10E9/L (ref 0–1.3)
MONOCYTES NFR BLD AUTO: 5.7 %
NEUTROPHILS # BLD AUTO: 3.7 10E9/L (ref 1.6–8.3)
NEUTROPHILS NFR BLD AUTO: 91.8 %
NRBC # BLD AUTO: 0 10*3/UL
NRBC BLD AUTO-RTO: 0 /100
NUM BPU REQUESTED: 1
PHOSPHATE SERPL-MCNC: 5.2 MG/DL (ref 2.5–4.5)
PLATELET # BLD AUTO: 88 10E9/L (ref 150–450)
POTASSIUM SERPL-SCNC: 4.7 MMOL/L (ref 3.4–5.3)
PRA SINGLE ANTIGEN IGG ANTIBODY: NORMAL
RBC # BLD AUTO: 2.27 10E12/L (ref 3.8–5.2)
SODIUM SERPL-SCNC: 136 MMOL/L (ref 133–144)
SPECIMEN EXP DATE BLD: NORMAL
TRANSFUSION STATUS PATIENT QL: NORMAL
WBC # BLD AUTO: 4.1 10E9/L (ref 4–11)

## 2017-04-26 PROCEDURE — 25000132 ZZH RX MED GY IP 250 OP 250 PS 637: Performed by: SURGERY

## 2017-04-26 PROCEDURE — 25000128 H RX IP 250 OP 636: Performed by: PHYSICIAN ASSISTANT

## 2017-04-26 PROCEDURE — 25000131 ZZH RX MED GY IP 250 OP 636 PS 637: Performed by: PHYSICIAN ASSISTANT

## 2017-04-26 PROCEDURE — 25000125 ZZHC RX 250: Performed by: STUDENT IN AN ORGANIZED HEALTH CARE EDUCATION/TRAINING PROGRAM

## 2017-04-26 PROCEDURE — 86901 BLOOD TYPING SEROLOGIC RH(D): CPT | Performed by: NURSE PRACTITIONER

## 2017-04-26 PROCEDURE — 80048 BASIC METABOLIC PNL TOTAL CA: CPT | Performed by: PHYSICIAN ASSISTANT

## 2017-04-26 PROCEDURE — 25000131 ZZH RX MED GY IP 250 OP 636 PS 637: Performed by: TRANSPLANT SURGERY

## 2017-04-26 PROCEDURE — 86923 COMPATIBILITY TEST ELECTRIC: CPT | Performed by: NURSE PRACTITIONER

## 2017-04-26 PROCEDURE — 25000132 ZZH RX MED GY IP 250 OP 250 PS 637: Performed by: PHYSICIAN ASSISTANT

## 2017-04-26 PROCEDURE — P9016 RBC LEUKOCYTES REDUCED: HCPCS | Performed by: NURSE PRACTITIONER

## 2017-04-26 PROCEDURE — 83735 ASSAY OF MAGNESIUM: CPT | Performed by: PHYSICIAN ASSISTANT

## 2017-04-26 PROCEDURE — 25000128 H RX IP 250 OP 636: Performed by: STUDENT IN AN ORGANIZED HEALTH CARE EDUCATION/TRAINING PROGRAM

## 2017-04-26 PROCEDURE — 12000026 ZZH R&B TRANSPLANT

## 2017-04-26 PROCEDURE — 85025 COMPLETE CBC W/AUTO DIFF WBC: CPT | Performed by: PHYSICIAN ASSISTANT

## 2017-04-26 PROCEDURE — 36592 COLLECT BLOOD FROM PICC: CPT | Performed by: PHYSICIAN ASSISTANT

## 2017-04-26 PROCEDURE — 84100 ASSAY OF PHOSPHORUS: CPT | Performed by: PHYSICIAN ASSISTANT

## 2017-04-26 PROCEDURE — 85018 HEMOGLOBIN: CPT | Performed by: PHYSICIAN ASSISTANT

## 2017-04-26 PROCEDURE — 25000132 ZZH RX MED GY IP 250 OP 250 PS 637: Performed by: STUDENT IN AN ORGANIZED HEALTH CARE EDUCATION/TRAINING PROGRAM

## 2017-04-26 PROCEDURE — 25000132 ZZH RX MED GY IP 250 OP 250 PS 637: Performed by: TRANSPLANT SURGERY

## 2017-04-26 PROCEDURE — 86850 RBC ANTIBODY SCREEN: CPT | Performed by: NURSE PRACTITIONER

## 2017-04-26 PROCEDURE — 86900 BLOOD TYPING SEROLOGIC ABO: CPT | Performed by: NURSE PRACTITIONER

## 2017-04-26 RX ORDER — FUROSEMIDE 10 MG/ML
80 INJECTION INTRAMUSCULAR; INTRAVENOUS ONCE
Status: COMPLETED | OUTPATIENT
Start: 2017-04-26 | End: 2017-04-26

## 2017-04-26 RX ORDER — FUROSEMIDE 10 MG/ML
80 INJECTION INTRAMUSCULAR; INTRAVENOUS
Status: DISCONTINUED | OUTPATIENT
Start: 2017-04-26 | End: 2017-04-27

## 2017-04-26 RX ORDER — METOPROLOL TARTRATE 1 MG/ML
5 INJECTION, SOLUTION INTRAVENOUS EVERY 6 HOURS PRN
Status: DISCONTINUED | OUTPATIENT
Start: 2017-04-26 | End: 2017-04-27 | Stop reason: HOSPADM

## 2017-04-26 RX ORDER — AMLODIPINE BESYLATE 5 MG/1
5 TABLET ORAL DAILY
Status: DISCONTINUED | OUTPATIENT
Start: 2017-04-26 | End: 2017-04-27 | Stop reason: HOSPADM

## 2017-04-26 RX ORDER — TACROLIMUS 1 MG/1
2 CAPSULE ORAL
Status: DISCONTINUED | OUTPATIENT
Start: 2017-04-26 | End: 2017-04-27 | Stop reason: HOSPADM

## 2017-04-26 RX ADMIN — MYCOPHENOLATE MOFETIL 750 MG: 250 CAPSULE ORAL at 17:51

## 2017-04-26 RX ADMIN — TACROLIMUS 2 MG: 1 CAPSULE ORAL at 17:51

## 2017-04-26 RX ADMIN — AMLODIPINE BESYLATE 5 MG: 5 TABLET ORAL at 13:22

## 2017-04-26 RX ADMIN — METOPROLOL TARTRATE 5 MG: 5 INJECTION INTRAVENOUS at 20:51

## 2017-04-26 RX ADMIN — ACETAMINOPHEN 1000 MG: 500 TABLET, FILM COATED ORAL at 21:42

## 2017-04-26 RX ADMIN — MYCOPHENOLATE MOFETIL 750 MG: 250 CAPSULE ORAL at 08:35

## 2017-04-26 RX ADMIN — SENNOSIDES AND DOCUSATE SODIUM 2 TABLET: 8.6; 5 TABLET ORAL at 21:42

## 2017-04-26 RX ADMIN — FUROSEMIDE 80 MG: 10 INJECTION, SOLUTION INTRAVENOUS at 15:11

## 2017-04-26 RX ADMIN — ASPIRIN 325 MG: 325 TABLET, DELAYED RELEASE ORAL at 08:36

## 2017-04-26 RX ADMIN — PANTOPRAZOLE SODIUM 40 MG: 40 TABLET, DELAYED RELEASE ORAL at 08:36

## 2017-04-26 RX ADMIN — TACROLIMUS 2 MG: 1 CAPSULE ORAL at 10:44

## 2017-04-26 RX ADMIN — FUROSEMIDE 80 MG: 10 INJECTION, SOLUTION INTRAVENOUS at 09:00

## 2017-04-26 RX ADMIN — SULFAMETHOXAZOLE AND TRIMETHOPRIM 1 TABLET: 400; 80 TABLET ORAL at 08:35

## 2017-04-26 RX ADMIN — Medication 1 MG: at 02:44

## 2017-04-26 RX ADMIN — ACETAMINOPHEN 1000 MG: 500 TABLET, FILM COATED ORAL at 06:14

## 2017-04-26 RX ADMIN — ACETAMINOPHEN 1000 MG: 500 TABLET, FILM COATED ORAL at 14:16

## 2017-04-26 NOTE — PROVIDER NOTIFICATION
Neema Mccartney notified of critical lab result- hemoglobin 6.1. 1 unit RBC's ordered with type and cross to be completed.

## 2017-04-26 NOTE — PLAN OF CARE
"Problem: Goal Outcome Summary  Goal: Goal Outcome Summary  Outcome: Improving  /84 (BP Location: Right arm)  Pulse 86  Temp 98.5  F (36.9  C) (Oral)  Resp 16  Ht 1.575 m (5' 2\")  Wt 58.2 kg (128 lb 3.2 oz)  SpO2 98%  BMI 23.45 kg/m2      0000-2700: Afebrile, BPs elevated (max: 172/100), OVSS on RA. 5 mg amlodipine started with subsequent BP check 141/84. Patient reporting abdominal discomfort that is well controlled with scheduled tylenol. Huang with marginal amount cherry red UOP. Patient received IV lasix 80 mg x2 today. Soft black/green BM x2 per patient report. Order for hemoccult stool sample needs to be collected. Regular diet with poor appetite, ate 25% of one meal today. Denies nausea. R OCTAVIO with moderate serosanguinous OP. Incision liquid bandaged and JAISON. L AVF active +bruit/thrill. L IJ SL. Received 1 unit PRBC with hemoglobin up to 8.3 from 6.5. Ambulating independently in room.  at bedside throughout shift, helpful and supportive. Medication card updated. Labs given to patient for her to update lab book. Possible DC tomorrow or Friday. Specialty pharmacy notified and will attempt to see patient tomorrow. Will continue with POC, notify team with changes/concerns.           "

## 2017-04-26 NOTE — PLAN OF CARE
"Problem: Goal Outcome Summary  Goal: Goal Outcome Summary  Outcome: Improving  /83 (BP Location: Right arm)  Pulse 104  Temp 98.7  F (37.1  C) (Oral)  Resp 16  Ht 1.575 m (5' 2\")  Wt 59.4 kg (131 lb)  SpO2 95%  BMI 23.96 kg/m2     Patient VSS on RA; afebrile. Denies pain or nausea on 3g K diet. PIV and Triple lumen IJ saline locked after thymo finished. Huang output ~200 mL, red with a few clots. OCTAVIO with 50 mL out, serosanguineous. Incision CDI. Up with SBA. Continue with POC and notify MD with changes or concerns.          "

## 2017-04-26 NOTE — PROGRESS NOTES
Care Coordinator  D/I: I called Gemma at [facility] [phone] and verified that the [facility] dialysis unit has called her this morning, they have not fax'd records yet. I informed Gemma that we are planning for d/c tomorrow and plan to begin OP HD on Friday and Gemma is ok--she got my information that I fax'd yesterday.  P: Pt still deciding if they will stay in a hotel or at her sister's home in [facility]. Will follow.

## 2017-04-26 NOTE — PROGRESS NOTES
"  Nephrology Progress Note  04/26/2017         Assessment & Recommendations:   Rosibel Willingham is a 41 year old year old female with ESRD of unknow origin by Bx, was on HD for 6 mo, now s/p DDKT with DGF, most likely due to long CIT of 26 hours.     1.DDKT: with DGF,improving  Uout, volume up. No need for HD today.  2.INS: No DSA. She will get Thyroglobulin and Solumedrol 125 mg QD today. Start Tacrolimus today.   3.PPx: Rec CMV/EBV neg. Valcyte for 3 mo. Bactrim lifelong.   4.BP/ Volume status: improved with HD. Will give Lasix 80 mg BID to TID now her Uout increased. Fluid restriction. Start Amlodipine 5 mg QD .   5.Anemia: low, possible GI bleeding. Will send occult blood in stool. F/u HG in the afternoon.   6. CKD-MB: , will correct with improving kidney function.  Vit D is 29. Will f/u.   7.Hyperkalemia: corrected with HD. Will c/w low K diet.   8.Hyponateremia: secondary to fluid intake. She should be on fluid restriction.     Recommendations were communicated to primary team during rounds.     Seen and discussed with Dr. Krzysztof Nielson MD   [phone]     Interval History :   In the last 24 hours Rosibel Willingham slightly improving her Uout. She still feels puffy, but no SOB. BP slightly elevated. She had HD yesterday, 2 L UF removed. Hg down to 6.5, she had a black stool today. Has a h/o GI bleeding, unclear source. She got one URBC.   Review of Systems:   4 pt ROS reviewed alone and are all negative     Physical Exam:   I/O last 3 completed shifts:  In: 350 [P.O.:350]  Out: 2700 [Urine:440; Drains:260; Other:2000]   BP (!) 151/100 (BP Location: Right arm)  Pulse 91  Temp 98.6  F (37  C) (Oral)  Resp 18  Ht 1.575 m (5' 2\")  Wt 58.2 kg (128 lb 3.2 oz)  SpO2 98%  BMI 23.45 kg/m2     GENERAL APPEARANCE: sitting in bed, feels tired   HENT: mouth without ulcers or lesions  PULM: lungs crackles BB,   CV: regular rhythm, normal rate, no rub     -JVP  No      -edema generalized trace edema  "   GI: soft, tender over the surgical area  NEURO:  no asterixis     Labs:   All labs reviewed by me  Electrolytes/Renal -   Recent Labs   Lab Test  04/26/17   0612  04/25/17   0606  04/24/17   1309   04/24/17   0602   NA  136  131*   --    --   137   POTASSIUM  4.7  5.1  4.8   < >  4.6   CHLORIDE  100  99   --    --   103   CO2  24  22   --    --   26   BUN  30  43*   --    --   21   CR  4.29*  6.09*   --    --   3.90*   GLC  130*  135*   --    --   158*   ARLINE  8.4*  8.3*   --    --   8.2*   MAG  1.8  1.8   --    --   1.6   PHOS  5.2*  6.5*   --    --   3.9    < > = values in this interval not displayed.       CBC -   Recent Labs   Lab Test  04/26/17   0612  04/25/17   0606  04/24/17   1309   04/24/17   0602   WBC  4.1  11.0   --    --   11.0   HGB  6.5*  7.1*  7.6*   < >  7.3*   PLT  88*  109*   --    --   106*    < > = values in this interval not displayed.       LFTs -   Recent Labs   Lab Test  04/22/17   1230  09/26/16   0700   ALKPHOS  56  50   BILITOTAL  0.5  0.4   ALT  32  14   AST  16  11   PROTTOTAL  8.6  7.1   ALBUMIN  4.8  3.8       Iron Panel -   Recent Labs   Lab Test  04/23/17   0617   IRON  47   IRONSAT  25   MALLORIE  71       Current Medications:    tacrolimus  2 mg Oral BID IS     furosemide  80 mg Intravenous BID     amLODIPine  5 mg Oral Daily     acetaminophen  650 mg Oral Once     sulfamethoxazole-trimethoprim  1 tablet Oral Q Mon Wed Fri AM     aspirin EC  325 mg Oral Daily     sodium chloride (PF)  10 mL Intracatheter Q8H     valGANciclovir  450 mg Oral Once per day on Mon Thu     mycophenolate  750 mg Oral BID IS     pantoprazole  40 mg Oral Daily     senna-docusate  2 tablet Oral At Bedtime     acetaminophen  1,000 mg Oral Q8H AGUSTIN Nielson MD     Attestation:  This patient has been seen and evaluated by me, Sid Blankenship MD.  I have reviewed the note and agree with plan of care as documented by the fellow.

## 2017-04-26 NOTE — PROVIDER NOTIFICATION
CAMILLA Kirk notified of /100 and re-check of 151/100. Continue to monitor per PA. Nephrology assessing BP medication needs. Will report changes/concerns to team.

## 2017-04-26 NOTE — PROGRESS NOTES
Transplant Admission Psychosocial Assessment    Patient Name: Rosibel Willingham  : 1975  Age: 41 year old  MRN: [id number]  Completed assessment with: The patient, her spouse Ian and her mom Irina.    Patient underwent  donor kidney transplant on 17.  Met with patient to update psychosocial assessment and provide brief education about SW role while inpatient, as well as expectations/requirements and follow up needs post-transplant. SW also provided education about need for compliance with transplant medications, and explained ESRD Medicare benefits and medication coverage under Medicare part B.  Medicare 2728 forms completed and signed by patient.  Presenting Information   Living Situation: Pt lives with her spouse Ian in a house in [place], [place].  If not local, plans for short term stay:  They either plan to stay at a family members home or hotel, they are not sure yet.    Previous Functional Status: Independent, works full time, drives  Cultural/Language/Spiritual Considerations: None indicated    Support System  Primary Support Person Spouse Ian  Other support:  Mom, sisters, in-laws  Plan for support in immediate post-transplant period: Ian will provide  caregiver support    Mental Health/Coping:   History of Mental Health: Pt denies a hx of mental illness  Current status: Pt denies any recent symptoms of anxiety, depression or any other mental health issues.  Coping: Pt appears to be coping well, given that she will need outpatient dialysis due to delayed graft function  Services Needed/Recommended: None indicated at this time    Financial   Income: Both pt and Ian work full-time  Impact of transplant on income: Pt has short and long term disability coverage through her employer  Insurance and medication coverage: [facility] through her employer  PT HAS NO DEDUCTIBLE AND WILL PAY $10.00 FOR GENERICS, 20% FOR PREFERRED BRANDS WITH A MINIMUM COPAY OF $30 AND MAXIMUM COPAY OF $45.00,  20% FOR NONPREFERRED BRANDS WITH A MINIMUM COPAY OF $60 AND A MAXIMUM COPAY OF $75 WITH A $2000 MAX OUT OF POCKET.  (OUT OF POCKET HAS BEEN MET) PT IS RESTRICTED TO [facility] ([phone]) FOR SPECIALTY MEDS.  TEST CLAIMS:CELLCEPT=SPECIALTY DRUG-PRODUCT NOT APPROPRIATE FOR THIS LOCATION, MYCOPHENOLATE=SPECIALTY DRUG-PRODUCT NOT APPROPRIATE FOR THIS LOCATION, PROGRAF=SPECIALTY DRUG-PRODUCT NOT APPROPRIATE FOR THIS LOCATION, TACROLIMUS=SPECIALTY DRUG-PRODUCT NOT APPROPRIATE FOR THIS LOCATION, NEORAL=SPECIALTY DRUG-PRODUCT NOT APPROPRIATE FOR THIS LOCATION, CYCLOSPORINE=SPECIALTY DRUG-PRODUCT NOT APPROPRIATE FOR THIS LOCATION, VALCYTE=PRODUCT NOT COVERED, USE VALGANCICLOVIR, VALGANCICLOVIR=$0.00.  BILL [facility] ID#[id number] FOR 1ST FILL OF IMMUNOS.  Financial concerns: None reported  Resources needed: None indicated at this time    Assessment and recommendations:    [place] and her [place] Ian is from [place], they moved there 1 year ago and shortly thereafter had a hypertensive emergency which started her need for dialysis.  She has been on HD since last June and will need outpatient dialysis upon d/c d/t delayed graft function.  She will either be staying with family or a hotel, they are not sure yet.  [place] paperwork filled out by PA (Tawny Macias) for Ian, this has been faxed by writer and the original given to Ian.    Provided both inpatient and outpatient social work contact info and encouraged her to call if she has any questions/concerns.   She has no concerns about necessary follow-up or medication management.  She denied having add'l questions/concerns for SW at this time.  Please contact SW if add'l needs arise.  SW to remain available if add'l needs arise.      DIVYA Rae, [facility]    Ph: [phone]  Pager: [phone]

## 2017-04-26 NOTE — PLAN OF CARE
Problem: Goal Outcome Summary  Goal: Goal Outcome Summary  Outcome: No Change  9962-2236: Pt returned from renogram and dialysis around 1900. Since arriving back on the unit, pt has been afebrile. VSS on RA. Pt complaining of some abdominal discomfort but denied the administration of pain medications or other non-pharmacological interventions. Pt advanced to 3 gram K diet, pt was able to eat some dinner when arriving back on the unit, denies nausea. Triple Lumen IJ, two ports SL while the third port is currently infusing with patients scheduled dose of Thymo. PIV R Forearm SL. Huang catheter with red colored urine, 240 mL urine output emptied since 1900. OCTAVIO drain on R side of abdomen with moderate amounts of serosanguineous drainage, 135 mL emptied since 1900. Incision c/d/i, no drainage, approximated, and open to air. Med card and lab book updated. Pt still needs to watch videos on [facility]. Pt up with SBA. Call light in reach. Will continue to monitor and follow plan of care.

## 2017-04-26 NOTE — PROGRESS NOTES
Transplant Surgery  Inpatient Daily Progress Note  04/26/2017    Assessment & Plan: Rosibel Willingham is a 41 year old female with ESRD of unknown origin on dialysis since July 2016. Dry weight 52kg. Patient continued to make her normal UO while on HD. S/p DDKT with ureteral stent placement on 4/23/2017. CIT 25hrs. HD on POD 0 due to hyperkalemia, HD again on POD 2.     Graft function: DGF. HD on POD 0 and 2 due to hyperkalemia. Expected ATN due to long CIT but unclear etiology for native kidneys not producing urine post transplant.  Huang appears patent s/p flushing.  Fluid creatinine in drain 4.3. Possible due to lower BP, normal BP for patient 140s-160s per her report.   US transplant kidney showing good flow and no obstruction, renogram on 4/25 consistent with ATN.  Pain: scheduled tylenol. Oxycodone PRN.   Immunosuppression management:   Thymo induction: 100 mg intra-op, 100 mg POD 1, 125 mg POD 2 for a total of 6.14 mg/kg  Solu-medrol burst complete, 500 mg/ 250 mg/ 100 mg.  Mycophenolate 750 mg BID   Tacrolimus, hold start of CNI due to DGF.   DSA in process  Complexity of management: High. Contributing factors: induction, DGF  Hematology: Hgb decreased post op, now down to 6.5 this AM.  Will transfuse 1 unit pRBCs, will recheck hemoglobin after.  Having some hematuria.  PLT 88.  mg started due to venous reconstruction.  Cardiorespiratory:   HTN: SBPs 138-157.  Patient on amlodipine 5 mg PTA. PTA Metoprolol discontinued about 4 months ago. Stop metoprolol IV. Monitor.   Stable on RA  GI/Nutrition: PRABHAKAR, low k.   Endocrine: Hyperglycemia secondary to steroids. Monitor.  Fluid/Electrolytes: MIVF: Off due to decreased urine output.  Lasix 80 mg IV BID.  Dialyzed yesterday, no acute indication for dialysis yet today.  Low K diet.   CKD BMD: . Vitamin D 29. Monitor.   : Huang to remain due to new surgical anastomosis, will plan for removal tomorrow (4/27)  Infectious disease: afebrile.   Prophylaxis:  "DVT PCDs. ID ppx: Valcyte (CMV R+D+, EBV R+D+) x 12 weeks, Bactrim  Disposition: 7A, possible discharge tomorrow    Medical Decision Making: High  Subsequent visit 78305 (high level decision making)    OG/Fellow/Resident Provider: Tawny Macias PA-C    Faculty: Nicolas Winkler M.D., Ph.D.  _________________________________________________________________  Transplant History: Admitted 4/22/2017 for kidney transplant.  4/23/2017 (Kidney), Postoperative day: 3     Interval History: History is obtained from the patient  Overnight events: Pain control improved per patient.  Had some oral intake (but still minimal).  + BM, abdomen still feels distended, she thinks that it's due to fluids/edema.    ROS:   A 10-point review of systems was negative except as noted above.    Meds:    furosemide  80 mg Intravenous Once     acetaminophen  650 mg Oral Once     sulfamethoxazole-trimethoprim  1 tablet Oral Q Mon Wed Fri AM     aspirin EC  325 mg Oral Daily     sodium chloride (PF)  10 mL Intracatheter Q8H     valGANciclovir  450 mg Oral Once per day on Mon Thu     mycophenolate  750 mg Oral BID IS     pantoprazole  40 mg Oral Daily     senna-docusate  2 tablet Oral At Bedtime     acetaminophen  1,000 mg Oral Q8H AGUSTIN       Physical Exam:     Admit Weight: 52.9 kg (116 lb 10 oz)    Current vitals:   BP (!) 140/92 (BP Location: Right arm)  Pulse 91  Temp 99  F (37.2  C) (Oral)  Resp 16  Ht 1.575 m (5' 2\")  Wt 59.4 kg (131 lb)  SpO2 94%  BMI 23.96 kg/m2        Vital sign ranges:    Temp:  [98.5  F (36.9  C)-99  F (37.2  C)] 99  F (37.2  C)  Pulse:  [] 91  Heart Rate:  [] 91  Resp:  [16-18] 16  BP: (138-157)/(81-93) 140/92  SpO2:  [92 %-95 %] 94 %  Patient Vitals for the past 24 hrs:   BP Temp Temp src Pulse Heart Rate Resp SpO2   04/26/17 0729 (!) 140/92 99  F (37.2  C) Oral 91 91 16 94 %   04/26/17 0004 138/83 98.7  F (37.1  C) Oral - 96 16 95 %   04/25/17 1933 140/81 98.7  F (37.1  C) Oral - 97 16 92 %   04/25/17 " 1902 (!) 157/93 98.6  F (37  C) Oral - 93 16 95 %   04/25/17 1601 139/89 98.5  F (36.9  C) Oral - 89 - 93 %   04/25/17 1134 153/90 98.6  F (37  C) Oral 104 104 18 93 %     General Appearance: in no apparent distress.   Skin: normal, dry  Heart: regular rate and rhythm  Lungs: NLB on RA  Abdomen: soft, mild distension/edema, mildly tender RLQ around surgical incision and over kidney transplant. The wound is well approximated, no drainage and glued.  : ruvalcaba is present.  The genitalia is not edematous. Urine has scant amount of urine, + gross hematuria-cherry red- no clots seen.   Extremities: edema: absent.  Edema in abdomen/buttocks  Neurologic: awake, alert and oriented. Tremor absent.    Data:   CMP  Recent Labs  Lab 04/26/17  0612 04/25/17  0606 04/24/17  1015  04/22/17  1230    131*  --   --   < > 138   POTASSIUM 4.7 5.1  < >  --   < > 4.1   CHLORIDE 100 99  --   --   < > 98   CO2 24 22  --   --   < > 27   * 135*  --   --   < > 78   BUN 30 43*  --   --   < > 61*   CR 4.29* 6.09*  --   --   < > 8.57*   GFRESTIMATED 11* 8*  --   --   < > 5*   GFRESTBLACK 14* 9*  --   --   < > 6*   ARLINE 8.4* 8.3*  --   --   < > 10.7*   MAG 1.8 1.8  --   --   < >  --    PHOS 5.2* 6.5*  --   --   < >  --    ALBUMIN  --   --   --   --   --  4.8   BILITOTAL  --   --   --   --   --  0.5   ALKPHOS  --   --   --   --   --  56   AST  --   --   --   --   --  16   ALT  --   --   --   --   --  32   FCREAT  --   --   --  4.3  --   --    < > = values in this interval not displayed.  CBC  Recent Labs  Lab 04/26/17  0612 04/25/17  0606  04/22/17  1230   HGB 6.5* 7.1*  < > 11.3*   WBC 4.1 11.0  < > 8.0   PLT 88* 109*  < > 150   A1C  --   --   --  4.0*   < > = values in this interval not displayed.  COAGS    Recent Labs  Lab 04/22/17  1230   INR 1.04   PTT 29      Urinalysis  Recent Labs   Lab Test  04/22/17   1300  09/26/16   1539   COLOR  Light Yellow  Straw   APPEARANCE  Clear  Clear   URINEGLC  30*  50*   URINEBILI  Negative   Negative   URINEKETONE  Negative  Negative   SG  1.005  1.004   UBLD  Negative  Small*   URINEPH  8.0*  9.0*   PROTEIN  100*  30*   NITRITE  Negative  Negative   LEUKEST  Negative  Negative   RBCU  1  0   WBCU  <1  <1   UTPG  5.96*   --      Virology:  Hepatitis C Antibody   Date Value Ref Range Status   04/22/2017  NR Final    Nonreactive   Assay performance characteristics have not been established for newborns,   infants, and children

## 2017-04-26 NOTE — PROGRESS NOTES
HEMODIALYSIS TREATMENT NOTE    Date: 4/25/2017  Time: 7:23 PM    Data:   Pre Wt:  kg 59.4   Desired Wt: kg 57.4   Post Wt: 57.4 kg   Approximate (Net) Weight Removed: 2 Kg   Vascular Access Status: Access/lines visible and secure, Prescribed BFR achieved   Dialyzer Rinse: Streaked, Light   Total Blood Volume Processed: 66.3 Liters    Total Dialysis (Treatment) Time: 3 hrs    Interventions/Assessment:  Stable 3 hour HD tx via LUE fistula cannulated with 16 gauge needles. Thrill and bruit present. O2 2L via NC applied as pt reported some shortness of breath. Tylenol 1000 mg given PO at scheduled 1900. Pt states headaches are common at end of dialysis run.  Hand off report given to primary 7A nurse.      Plan:    Next HD tx per nephrology team.

## 2017-04-27 ENCOUNTER — TELEPHONE (OUTPATIENT)
Dept: PHARMACY | Facility: CLINIC | Age: 42
End: 2017-04-27

## 2017-04-27 VITALS
TEMPERATURE: 98.4 F | SYSTOLIC BLOOD PRESSURE: 160 MMHG | RESPIRATION RATE: 16 BRPM | BODY MASS INDEX: 23.53 KG/M2 | WEIGHT: 127.9 LBS | DIASTOLIC BLOOD PRESSURE: 97 MMHG | OXYGEN SATURATION: 97 % | HEART RATE: 91 BPM | HEIGHT: 62 IN

## 2017-04-27 PROBLEM — D84.9 IMMUNOSUPPRESSION (H): Status: ACTIVE | Noted: 2017-04-27

## 2017-04-27 LAB
ANION GAP SERPL CALCULATED.3IONS-SCNC: 11 MMOL/L (ref 3–14)
BASOPHILS # BLD AUTO: 0 10E9/L (ref 0–0.2)
BASOPHILS NFR BLD AUTO: 0 %
BUN SERPL-MCNC: 50 MG/DL (ref 7–30)
CALCIUM SERPL-MCNC: 8 MG/DL (ref 8.5–10.1)
CHLORIDE SERPL-SCNC: 101 MMOL/L (ref 94–109)
CO2 SERPL-SCNC: 23 MMOL/L (ref 20–32)
CREAT SERPL-MCNC: 6.08 MG/DL (ref 0.52–1.04)
DIFFERENTIAL METHOD BLD: ABNORMAL
DONOR IDENTIFICATION: NORMAL
DSA COMMENTS: NORMAL
DSA PRESENT: NO
DSA TEST METHOD: NORMAL
EOSINOPHIL # BLD AUTO: 0 10E9/L (ref 0–0.7)
EOSINOPHIL NFR BLD AUTO: 0.4 %
ERYTHROCYTE [DISTWIDTH] IN BLOOD BY AUTOMATED COUNT: 16.1 % (ref 10–15)
GFR SERPL CREATININE-BSD FRML MDRD: 8 ML/MIN/1.7M2
GLUCOSE BLDC GLUCOMTR-MCNC: 79 MG/DL (ref 70–99)
GLUCOSE SERPL-MCNC: 80 MG/DL (ref 70–99)
HCT VFR BLD AUTO: 23.6 % (ref 35–47)
HGB BLD-MCNC: 7.8 G/DL (ref 11.7–15.7)
IMM GRANULOCYTES # BLD: 0 10E9/L (ref 0–0.4)
IMM GRANULOCYTES NFR BLD: 0 %
LYMPHOCYTES # BLD AUTO: 0.3 10E9/L (ref 0.8–5.3)
LYMPHOCYTES NFR BLD AUTO: 5.6 %
MAGNESIUM SERPL-MCNC: 1.9 MG/DL (ref 1.6–2.3)
MCH RBC QN AUTO: 28.8 PG (ref 26.5–33)
MCHC RBC AUTO-ENTMCNC: 33.1 G/DL (ref 31.5–36.5)
MCV RBC AUTO: 87 FL (ref 78–100)
MONOCYTES # BLD AUTO: 0.3 10E9/L (ref 0–1.3)
MONOCYTES NFR BLD AUTO: 5.2 %
NEUTROPHILS # BLD AUTO: 4.6 10E9/L (ref 1.6–8.3)
NEUTROPHILS NFR BLD AUTO: 88.8 %
NRBC # BLD AUTO: 0 10*3/UL
NRBC BLD AUTO-RTO: 0 /100
ORGAN: NORMAL
PHOSPHATE SERPL-MCNC: 4.7 MG/DL (ref 2.5–4.5)
PLATELET # BLD AUTO: 107 10E9/L (ref 150–450)
POTASSIUM SERPL-SCNC: 3.9 MMOL/L (ref 3.4–5.3)
RBC # BLD AUTO: 2.71 10E12/L (ref 3.8–5.2)
SA1 CELL: NORMAL
SA1 COMMENTS: NORMAL
SA1 HI RISK ABY: NORMAL
SA1 MOD RISK ABY: NORMAL
SA1 TEST METHOD: NORMAL
SA2 CELL: NORMAL
SA2 COMMENTS: NORMAL
SA2 HI RISK ABY UA: NORMAL
SA2 MOD RISK ABY: NORMAL
SA2 TEST METHOD: NORMAL
SODIUM SERPL-SCNC: 135 MMOL/L (ref 133–144)
WBC # BLD AUTO: 5.2 10E9/L (ref 4–11)

## 2017-04-27 PROCEDURE — 25000132 ZZH RX MED GY IP 250 OP 250 PS 637: Performed by: TRANSPLANT SURGERY

## 2017-04-27 PROCEDURE — 25000132 ZZH RX MED GY IP 250 OP 250 PS 637: Performed by: PHYSICIAN ASSISTANT

## 2017-04-27 PROCEDURE — 25000132 ZZH RX MED GY IP 250 OP 250 PS 637: Performed by: SURGERY

## 2017-04-27 PROCEDURE — 25000131 ZZH RX MED GY IP 250 OP 636 PS 637: Performed by: TRANSPLANT SURGERY

## 2017-04-27 PROCEDURE — 36592 COLLECT BLOOD FROM PICC: CPT | Performed by: PHYSICIAN ASSISTANT

## 2017-04-27 PROCEDURE — 85025 COMPLETE CBC W/AUTO DIFF WBC: CPT | Performed by: PHYSICIAN ASSISTANT

## 2017-04-27 PROCEDURE — 25000131 ZZH RX MED GY IP 250 OP 636 PS 637: Performed by: PHYSICIAN ASSISTANT

## 2017-04-27 PROCEDURE — 84100 ASSAY OF PHOSPHORUS: CPT | Performed by: PHYSICIAN ASSISTANT

## 2017-04-27 PROCEDURE — 80048 BASIC METABOLIC PNL TOTAL CA: CPT | Performed by: PHYSICIAN ASSISTANT

## 2017-04-27 PROCEDURE — 25000128 H RX IP 250 OP 636: Performed by: STUDENT IN AN ORGANIZED HEALTH CARE EDUCATION/TRAINING PROGRAM

## 2017-04-27 PROCEDURE — 00000146 ZZHCL STATISTIC GLUCOSE BY METER IP

## 2017-04-27 PROCEDURE — 83735 ASSAY OF MAGNESIUM: CPT | Performed by: PHYSICIAN ASSISTANT

## 2017-04-27 RX ORDER — ACETAMINOPHEN 325 MG/1
650 TABLET ORAL EVERY 4 HOURS PRN
Qty: 100 TABLET | Refills: 0 | Status: SHIPPED | OUTPATIENT
Start: 2017-04-27

## 2017-04-27 RX ORDER — FUROSEMIDE 40 MG
80 TABLET ORAL 2 TIMES DAILY
Status: DISCONTINUED | OUTPATIENT
Start: 2017-04-27 | End: 2017-04-27 | Stop reason: HOSPADM

## 2017-04-27 RX ORDER — ASPIRIN 325 MG
325 TABLET, DELAYED RELEASE (ENTERIC COATED) ORAL DAILY
Qty: 30 TABLET | Refills: 11 | Status: SHIPPED | OUTPATIENT
Start: 2017-04-27 | End: 2017-05-08

## 2017-04-27 RX ORDER — VALGANCICLOVIR 450 MG/1
450 TABLET, FILM COATED ORAL
Qty: 8 TABLET | Refills: 2 | Status: SHIPPED | OUTPATIENT
Start: 2017-04-27 | End: 2017-05-22

## 2017-04-27 RX ORDER — ACETAMINOPHEN 325 MG/1
650 TABLET ORAL EVERY 4 HOURS PRN
Status: DISCONTINUED | OUTPATIENT
Start: 2017-04-27 | End: 2017-04-27 | Stop reason: HOSPADM

## 2017-04-27 RX ORDER — TACROLIMUS 1 MG/1
2 CAPSULE ORAL 2 TIMES DAILY
Qty: 120 CAPSULE | Refills: 11 | Status: SHIPPED | OUTPATIENT
Start: 2017-04-27 | End: 2017-05-05

## 2017-04-27 RX ORDER — AMOXICILLIN 250 MG
1-2 CAPSULE ORAL DAILY PRN
Qty: 100 TABLET | Refills: 0 | Status: SHIPPED | OUTPATIENT
Start: 2017-04-27 | End: 2017-07-03

## 2017-04-27 RX ORDER — SULFAMETHOXAZOLE AND TRIMETHOPRIM 400; 80 MG/1; MG/1
1 TABLET ORAL
Qty: 12 TABLET | Refills: 11 | Status: SHIPPED | OUTPATIENT
Start: 2017-04-28 | End: 2017-05-22

## 2017-04-27 RX ORDER — FUROSEMIDE 40 MG
80 TABLET ORAL 2 TIMES DAILY
Qty: 56 TABLET | Refills: 0 | Status: SHIPPED | OUTPATIENT
Start: 2017-04-27 | End: 2017-05-01

## 2017-04-27 RX ORDER — MYCOPHENOLATE MOFETIL 250 MG/1
750 CAPSULE ORAL 2 TIMES DAILY
Qty: 180 CAPSULE | Refills: 11 | Status: SHIPPED | OUTPATIENT
Start: 2017-04-27 | End: 2017-05-05

## 2017-04-27 RX ORDER — AMLODIPINE BESYLATE 5 MG/1
5 TABLET ORAL DAILY
Qty: 30 TABLET | Refills: 3 | Status: SHIPPED | OUTPATIENT
Start: 2017-04-27 | End: 2017-05-01

## 2017-04-27 RX ADMIN — TACROLIMUS 2 MG: 1 CAPSULE ORAL at 09:05

## 2017-04-27 RX ADMIN — PANTOPRAZOLE SODIUM 40 MG: 40 TABLET, DELAYED RELEASE ORAL at 09:05

## 2017-04-27 RX ADMIN — ACETAMINOPHEN 650 MG: 325 TABLET, FILM COATED ORAL at 13:33

## 2017-04-27 RX ADMIN — ASPIRIN 325 MG: 325 TABLET, DELAYED RELEASE ORAL at 09:05

## 2017-04-27 RX ADMIN — AMLODIPINE BESYLATE 5 MG: 5 TABLET ORAL at 09:05

## 2017-04-27 RX ADMIN — MYCOPHENOLATE MOFETIL 750 MG: 250 CAPSULE ORAL at 09:05

## 2017-04-27 RX ADMIN — FUROSEMIDE 80 MG: 10 INJECTION, SOLUTION INTRAVENOUS at 09:05

## 2017-04-27 RX ADMIN — ACETAMINOPHEN 1000 MG: 500 TABLET, FILM COATED ORAL at 06:04

## 2017-04-27 RX ADMIN — VALGANCICLOVIR HYDROCHLORIDE 450 MG: 450 TABLET ORAL at 09:05

## 2017-04-27 RX ADMIN — FUROSEMIDE 80 MG: 40 TABLET ORAL at 13:43

## 2017-04-27 NOTE — PROVIDER NOTIFICATION
On call George Rosario paged regarding pt's most recent vitals:  B/P: 169/96, T: 98.6, P: 87, R: 16  Parameter to notify if BP greater than 160s/90s. Home amlodipine restarted today; no prn's available. Asked provider if they would like to treat BP. Awaiting return page.    2030: Paged on call George Rosario after BP recheck ([phone]) to clarify order as it had been placed as a PO order; provider changed order to prn IV order and okay to proceed with giving med.

## 2017-04-27 NOTE — PROGRESS NOTES
"  Nephrology Progress Note  04/27/2017         Assessment & Recommendations:   Rosibel Willingham is a 41 year old year old female with ESRD of unknow origin by Bx, was on HD for 6 mo, now s/p DDKT with DGF, most likely due to long CIT of 26 hours.     1.DDKT: with DGF,improving Uout,wt stable, BP at goal.  No need for HD today.   2.INS: No DSA. Completed induction.  C/wTacrolimus and MMF. Tacrolimus level checked on today   3.PPx: Rec CMV/EBV neg. Valcyte for 3 mo. Bactrim lifelong.   4.BP/ Volume status: Wt stable. We can D/C her on Lasix 80 mg BID, with Uout monitoring. Stop Lasix if Uout >100 cc/h. C/w Amlodipine 5 mg PM.   5.Anemia: stable Hg after BP. No sings of bleeding. Will f/u.   6. CKD-MB: , will correct with improving kidney function.  Vit D is 29. Will f/u.   7.Hyperkalemia: corrected with HD. Will c/w low K diet until her kidney function improved.   8.Hyponateremia: corrected. C/w fluid restriction until her Uout improves.     Recommendations were communicated to primary team during rounds.     Seen and discussed with Dr. Krzysztof Nielson MD   [phone]     Interval History :   In the last 24 hours Rosibel Willingham with improving Uout, Cr still up-trending. Wt stable. No fever. She passed stool, no black stool. Hg stable. Hematuria present.     Review of Systems:   4 pt ROS reviewed alone and are all negative     Physical Exam:   I/O last 3 completed shifts:  In: 1262.08 [P.O.:720; I.V.:240]  Out: 885 [Urine:640; Drains:245]   /75 (BP Location: Right arm)  Pulse 89  Temp 98.6  F (37  C) (Oral)  Resp 16  Ht 1.575 m (5' 2\")  Wt 58 kg (127 lb 14.4 oz)  SpO2 98%  BMI 23.39 kg/m2     GENERAL APPEARANCE: sitting in chair, feels good today.   HENT: mouth without ulcers or lesions  PULM: lungs crackles BB,   CV: regular rhythm, normal rate, no rub     -JVP  No      -edema generalized trace edema    GI: soft, tender over the surgical area  NEURO:  no asterixis     Labs:   All labs " reviewed by me  Electrolytes/Renal -   Recent Labs   Lab Test  04/27/17   0644  04/26/17   0612  04/25/17   0606   NA  135  136  131*   POTASSIUM  3.9  4.7  5.1   CHLORIDE  101  100  99   CO2  23  24  22   BUN  50*  30  43*   CR  6.08*  4.29*  6.09*   GLC  80  130*  135*   ARLINE  8.0*  8.4*  8.3*   MAG  1.9  1.8  1.8   PHOS  4.7*  5.2*  6.5*       CBC -   Recent Labs   Lab Test  04/27/17   0644  04/26/17   1416  04/26/17   0612  04/25/17   0606   WBC  5.2   --   4.1  11.0   HGB  7.8*  8.3*  6.5*  7.1*   PLT  107*   --   88*  109*       LFTs -   Recent Labs   Lab Test  04/22/17   1230  09/26/16   0700   ALKPHOS  56  50   BILITOTAL  0.5  0.4   ALT  32  14   AST  16  11   PROTTOTAL  8.6  7.1   ALBUMIN  4.8  3.8       Iron Panel -   Recent Labs   Lab Test  04/23/17   0617   IRON  47   IRONSAT  25   MALLORIE  71       Current Medications:    tacrolimus  2 mg Oral BID IS     furosemide  80 mg Intravenous BID     amLODIPine  5 mg Oral Daily     acetaminophen  650 mg Oral Once     sulfamethoxazole-trimethoprim  1 tablet Oral Q Mon Wed Fri AM     aspirin EC  325 mg Oral Daily     sodium chloride (PF)  10 mL Intracatheter Q8H     valGANciclovir  450 mg Oral Once per day on Mon Thu     mycophenolate  750 mg Oral BID IS     pantoprazole  40 mg Oral Daily     senna-docusate  2 tablet Oral At Bedtime        Armando Nielson MD     Attestation:  This patient has been seen and evaluated by me, Sid Blankenship MD.  I have reviewed the note and agree with plan of care as documented by the fellow.

## 2017-04-27 NOTE — PLAN OF CARE
Problem: Goal Outcome Summary  Goal: Goal Outcome Summary  Outcome: No Change  2720-0393: Hypertensive (150s-160s/90s-100s), prn IV metoprolol 5mg administered x1 with improvement to 140s/80s. OVSS on RA. Reporting negligible pain that is controlled with scheduled Tylenol; declines further intervention. Denies nausea, but has poor intake on 3g K diet. Urine output borderline adequate, ranging from 50-100mL every 2 hours. Urine remains red, unchanged in color from beginning of shift. Reports passing gas but has not had BM this shift; hemoccult stool remains to be collected. Incision c/d/i, approximated with dermabond. R OCTAVIO with moderate amounts of serosanguinous output. Pt reviewed some of the MTP videos with  today; denied questions regarding content, but did have some questions about signs/symptoms to be aware of (signs of possible clot in kidney, increased edema, etc) that were answered. Continue to reinforce education. ROBNY CRAWLEY. Resting with call light in reach. Will continue to monitor and follow plan of care.

## 2017-04-27 NOTE — PLAN OF CARE
"Problem: Goal Outcome Summary  Goal: Goal Outcome Summary  Outcome: Adequate for Discharge Date Met:  04/27/17  BP (!) 160/97  Pulse 91  Temp 98.4  F (36.9  C) (Oral)  Resp 16  Ht 1.575 m (5' 2\")  Wt 58 kg (127 lb 14.4 oz)  SpO2 97%  BMI 23.39 kg/m2      6571-0343: Afebrile, BPs elevated (max: 160/97), OVSS on RA. Tawny Macias, PA notified of elevated BPs, per team will continue amlodipine and monitor fluid status as they believe HTN is partially d/t fluid volume status. Reports of minimal abdominal pain that is well controlled on PRN tylenol. Huang removed at 1015, voiding cherry red urine with small clots post removal with PVR 58 mL. CAMILLA Kirk aware of clots in urine. Patient encouraged to monitor amount and size of clots and notify transplant coordinator if amount or size of clots increase. Multiple small, loose black/brown BM this morning mixed with bloody urine (unable to send for hemoccult stool). Regular diet with fair appetite. Abdomen distended, patient reports \"feeling bloated today\". BLE edema 2-3+. Receiving BID lasix 80 mg. R OCTAVIO with serosanguinous OP, dressing changed by patient's , Ian. Writer educated Rosibel and Ian on OCTAVIO drain cares and gave them Caring for your Jake Ruiz Drain FOD. Both state understanding of instructions and Ian was able to properly demonstrate drain care. Incision liquid bandaged and JAISON. L IJ removed at 1415 without complication, site covered with primapore CDI. PIV removed. AVF active. Set up for outpatient dialysis PRN at [facility]. [facility] set up to visit patient at hotel after ATC appointments completed. Will call report to [facility].           "

## 2017-04-27 NOTE — PROGRESS NOTES
Transplant Surgery  Inpatient Daily Progress Note  04/27/2017    Assessment & Plan: Rosibel Willingham is a 41 year old female with ESRD of unknown origin on dialysis since July 2016. Dry weight 52kg. Patient continued to make her normal UO while on HD. S/p DDKT with ureteral stent placement on 4/23/2017. CIT 25hrs. HD on POD 0 due to hyperkalemia, HD again on POD 2.     Graft function: UOP now improving, usually first sign of renal recovery.  Cr still increasing, 6.08 (4.3), rise to be expected post dialysis.  K normal at 3.9, Phos high but trending down.  Does have arrangements for outpatient dialysis every MWF.    DGF. HD on POD 0 and 2 due to hyperkalemia. Expected ATN due to long CIT but unclear etiology for native kidneys not producing urine post transplant.  Huang appears patent s/p flushing.  Fluid creatinine in drain 4.3. Possible due to lower BP, normal BP for patient 140s-160s per her report.   US transplant kidney showing good flow and no obstruction, renogram on 4/25 consistent with ATN.  Pain: scheduled tylenol, will change to PRN. Oxycodone PRN.   Immunosuppression management:   Thymo induction: 100 mg intra-op, 100 mg POD 1, 125 mg POD 2 for a total of 6.14 mg/kg  Solu-medrol burst complete, 500 mg/ 250 mg/ 100 mg.  Mycophenolate 750 mg BID   Tacrolimus, now started at 2 mg BID, first level tomorrow.  DSA in process  Complexity of management: High. Contributing factors: induction, DGF  Hematology: Hgb decreased post op, 6.5 on 4/26, transfused 1 unit, hgb marcy appropriately.  Having some hematuria.   (88).  mg started due to venous reconstruction.  Cardiorespiratory:   HTN: SBPs 127-172.  Patient on amlodipine 5 mg PTA, now restarted and BPs improving. PTA Metoprolol discontinued about 4 months ago. Stop metoprolol IV.   GI/Nutrition: PRABHAKAR, low K diet, K is 3.9 this morning.   Endocrine: Hyperglycemia secondary to steroids, resolved.  Fluid/Electrolytes: MIVF: Off due to decreased urine  "output.  Lasix 80 mg IV BID.  Dialyzed POD 0 and 2, no acute indication for dialysis yet today.  Low K diet.   CKD BMD: . Vitamin D 29. Monitor.   : Remove ruvalcaba today  Infectious disease: afebrile.   Prophylaxis: DVT PCDs. ID ppx: Valcyte (CMV R+D+, EBV R+D+) x 12 weeks, Bactrim  Disposition: 7A, possible discharge later today vs tomorrow    Medical Decision Making: High  Subsequent visit 59692 (high level decision making)    OG/Fellow/Resident Provider: Tawny Macias PA-C    Faculty: Nicolas Winkler M.D., Ph.D.  _________________________________________________________________  Transplant History: Admitted 4/22/2017 for kidney transplant.  4/23/2017 (Kidney), Postoperative day: 4     Interval History: History is obtained from the patient  Overnight events: Pain control improved per patient.  Had some oral intake (but still minimal).  + BM, abdomen still feels distended, she thinks that it's due to fluids/edema.    ROS:   A 10-point review of systems was negative except as noted above.    Meds:    tacrolimus  2 mg Oral BID IS     furosemide  80 mg Intravenous BID     amLODIPine  5 mg Oral Daily     acetaminophen  650 mg Oral Once     sulfamethoxazole-trimethoprim  1 tablet Oral Q Mon Wed Fri AM     aspirin EC  325 mg Oral Daily     sodium chloride (PF)  10 mL Intracatheter Q8H     valGANciclovir  450 mg Oral Once per day on Mon Thu     mycophenolate  750 mg Oral BID IS     pantoprazole  40 mg Oral Daily     senna-docusate  2 tablet Oral At Bedtime     acetaminophen  1,000 mg Oral Q8H AGUSTIN       Physical Exam:     Admit Weight: 52.9 kg (116 lb 10 oz)    Current vitals:   /75 (BP Location: Right arm)  Pulse 89  Temp 98.6  F (37  C) (Oral)  Resp 16  Ht 1.575 m (5' 2\")  Wt 58 kg (127 lb 14.4 oz)  SpO2 98%  BMI 23.39 kg/m2        Vital sign ranges:    Temp:  [98.1  F (36.7  C)-98.7  F (37.1  C)] 98.6  F (37  C)  Pulse:  [86-89] 89  Heart Rate:  [77-92] 92  Resp:  [16-18] 16  BP: (127-172)/() " 133/75  SpO2:  [97 %-98 %] 98 %  Patient Vitals for the past 24 hrs:   BP Temp Temp src Pulse Heart Rate Resp SpO2 Weight   04/27/17 0714 133/75 98.6  F (37  C) Oral 89 - 16 98 % -   04/27/17 0624 - - - - - - - 58 kg (127 lb 14.4 oz)   04/27/17 0430 (!) 145/91 98.1  F (36.7  C) Oral - 92 16 98 % -   04/26/17 2300 127/75 98.7  F (37.1  C) Oral - 87 16 97 % -   04/26/17 2120 147/87 - - - - - - -   04/26/17 2115 (!) 143/91 - - - - - - -   04/26/17 2105 (!) 156/91 - - - - - - -   04/26/17 2101 (!) 156/105 - - - 77 - - -   04/26/17 2034 (!) 159/98 - - - 89 - - -   04/26/17 1933 (!) 169/96 98.6  F (37  C) Oral 87 87 16 98 % -   04/26/17 1536 141/84 98.5  F (36.9  C) Oral 86 86 16 98 % -   04/26/17 1155 (!) 151/100 98.6  F (37  C) Oral - 80 18 98 % -   04/26/17 1045 (!) 172/100 98.6  F (37  C) Oral - 86 18 98 % -     General Appearance: in no apparent distress.   Skin: normal, dry  Heart: regular rate and rhythm  Lungs: NLB on RA  Abdomen: soft, mild distension/edema, mildly tender RLQ around surgical incision and over kidney transplant. The wound is well approximated, no drainage and glued.  : ruvalcaba is present.  The genitalia is not edematous. Urine has scant amount of urine, + gross hematuria-cherry red- no clots seen.   Extremities: edema: 1+ bilateral.  Edema in abdomen/buttocks  Neurologic: awake, alert and oriented. Tremor absent.    Data:   CMP  Recent Labs  Lab 04/27/17  0644 04/26/17  0612  04/24/17  1015  04/22/17  1230    136  < >  --   < > 138   POTASSIUM 3.9 4.7  < >  --   < > 4.1   CHLORIDE 101 100  < >  --   < > 98   CO2 23 24  < >  --   < > 27   GLC 80 130*  < >  --   < > 78   BUN 50* 30  < >  --   < > 61*   CR 6.08* 4.29*  < >  --   < > 8.57*   GFRESTIMATED 8* 11*  < >  --   < > 5*   GFRESTBLACK 9* 14*  < >  --   < > 6*   ARLINE 8.0* 8.4*  < >  --   < > 10.7*   MAG 1.9 1.8  < >  --   < >  --    PHOS 4.7* 5.2*  < >  --   < >  --    ALBUMIN  --   --   --   --   --  4.8   BILITOTAL  --   --   --   --    --  0.5   ALKPHOS  --   --   --   --   --  56   AST  --   --   --   --   --  16   ALT  --   --   --   --   --  32   FCREAT  --   --   --  4.3  --   --    < > = values in this interval not displayed.  CBC  Recent Labs  Lab 04/27/17  0644 04/26/17  1416 04/26/17  0612  04/22/17  1230   HGB 7.8* 8.3* 6.5*  < > 11.3*   WBC 5.2  --  4.1  < > 8.0   *  --  88*  < > 150   A1C  --   --   --   --  4.0*   < > = values in this interval not displayed.  COAGS    Recent Labs  Lab 04/22/17  1230   INR 1.04   PTT 29      Urinalysis  Recent Labs   Lab Test  04/22/17   1300  09/26/16   1539   COLOR  Light Yellow  Straw   APPEARANCE  Clear  Clear   URINEGLC  30*  50*   URINEBILI  Negative  Negative   URINEKETONE  Negative  Negative   SG  1.005  1.004   UBLD  Negative  Small*   URINEPH  8.0*  9.0*   PROTEIN  100*  30*   NITRITE  Negative  Negative   LEUKEST  Negative  Negative   RBCU  1  0   WBCU  <1  <1   UTPG  5.96*   --      Virology:  Hepatitis C Antibody   Date Value Ref Range Status   04/22/2017  NR Final    Nonreactive   Assay performance characteristics have not been established for newborns,   infants, and children

## 2017-04-27 NOTE — PROVIDER NOTIFICATION
Notified on call provider George Rosario that pt's UOP last 2 hours was total of 50mL; UOP has been marginal since this writer arrived at 1900. Awaiting orders, if any.    Addendum 0225: Spoke with on call provider; pt has been hovering near 30mL/hr average and plan is to recheck at 0400 and see what UOP is at that time. Will notify provider of result and determine course of action.    Addendum 0300: Last hour UOP 55mL/hr; on call updated, no further action deemed required at this time.

## 2017-04-27 NOTE — PROVIDER NOTIFICATION
CAMILLA Kirk notified of the presence of small clots in urine. Voiding without difficulty and PVR of 58 mL post Huang removal. Will await call back.

## 2017-04-27 NOTE — DISCHARGE SUMMARY
[facility], [place]    Discharge Summary  Transplant Surgery    Date of Admission:  2017  Date of Discharge:  2017  Discharging Provider: Tawny Macias  Date of Service (when I saw the patient): 17    Discharge Diagnoses   Active Problems:    Anemia of chronic kidney failure    Hypertension    Kidney transplant candidate    -donor kidney transplant    Immunosuppression (H)      Procedure/Surgery Information   Procedure: Procedure(s):   Donor Kidney transplant   Ureteral stent placement - Wound Class: II-Clean Contaminated   Surgeon(s): Surgeon(s) and Role:     * Leanne Montelongo MD - Primary     * Isra Lr MD - Resident - Assisting     * Mani Whiteside MD - Resident - Assisting     History of Present Illness   Rosibel Willingham is a 41 year old female with ESRD of unknown origin on dialysis since 2016. Dry weight 52kg. Patient continued to make her normal UO while on HD. S/p DDKT with ureteral stent placement on 2017. Cold ischemia time 25hrs. HD on POD 0 due to hyperkalemia, HD again on POD 2.     Hospital Course   S/p kidney transplant: Delayed graft function, underwent hemodialysis on POD 0 and 2 due to hyperkalemia. Expected ATN due to long CIT but unclear etiology for native kidneys not producing urine post transplant. UOP improving prior to discharge, will discharge with continue oral lasix 80 mg BID.  Improved urine output usually first sign of renal recovery. Creatinine increased on the day of discharge, 6.08 (4.3), but rise to be expected post dialysis. K normal at 3.9, Phos high but trending down. Does have arrangements for outpatient dialysis every MWF.  Will plan for outpatient dialysis the day after discharge, on , will cancel if AM labs.  Ultrasound of the transplant kidney showing good flow and no obstruction, renogram on  consistent with ATN.   mg started due to venous  reconstruction.    CKD BMD: . Vitamin D 29. Monitor.     Pain control with tylenol.  Return of bowel function prior to discharge.  Will continue low potassium diet for now until renal function has improved.  Huang removed prior to discharge.    Induction immunosuppression completed with thymoglobulin: 100 mg intra-op, 100 mg POD 1, 125 mg POD 2 for a total of 6.14 mg/kg.  Solu-Medrol burst complete, 500/250/100 mg.  Maintenance immunosuppression with mycophenolate 750 mg BID.  Tacrolimus started at 2 mg BID, will check first level on 4/28.  DSA in process.  Prophylaxis with valcyte (CMV R+D+, EBV R+D+) x 12 weeks, Bactrim    Acute blood loss anemia: Hgb decreased post op, 6.5 on 4/26, transfused 1 unit, hgb marcy appropriately. Having some hematuria.  (88).  Will continue to monitor.    Hypertension: SBPs 127-172. Restarted amlodipine 5 mg daily (home medication). PTA Metoprolol discontinued about 4 months ago. Stop metoprolol IV.     Steroid induced hyperglycemia:  Mild, resolved prior to discharge    Tawny Macias PA-C    Discharge Disposition   Discharged to stay locally   Condition at discharge: Stable    Primary Care Physician   Physician No Ref-Primary    Consultations This Hospital Stay   NEPHROLOGY ESRD ADULT IP CONSULT  VASCULAR ACCESS CARE ADULT IP CONSULT  VASCULAR ACCESS CARE ADULT IP CONSULT  VASCULAR ACCESS CARE ADULT IP CONSULT  SOCIAL WORK IP CONSULT  PHARMACY IP CONSULT  NUTRITION SERVICES ADULT IP CONSULT  NEPHROLOGY KIDNEY/PANCREAS TRANSPLANT ADULT IP CONSULT    Time Spent on this Encounter   I have spent greater than 30 minutes on this discharge.    Discharge Orders   Discharge Medications   Current Discharge Medication List      START taking these medications    Details   mycophenolate (CELLCEPT - GENERIC EQUIVALENT) 250 MG capsule Take 3 capsules (750 mg) by mouth 2 times daily  Qty: 180 capsule, Refills: 11    Associated Diagnoses: Kidney replaced by transplant;  Immunosuppression (H)      tacrolimus (PROGRAF - GENERIC EQUIVALENT) 1 MG capsule Take 2 capsules (2 mg) by mouth 2 times daily  Qty: 120 capsule, Refills: 11    Associated Diagnoses: Kidney replaced by transplant; Immunosuppression (H)      sulfamethoxazole-trimethoprim (BACTRIM/SEPTRA) 400-80 MG per tablet Take 1 tablet by mouth Every Mon, Wed, Fri Morning  Qty: 12 tablet, Refills: 11    Associated Diagnoses: Immunosuppression (H); Kidney replaced by transplant      valGANciclovir (VALCYTE) 450 MG tablet Take 1 tablet (450 mg) by mouth twice a week  Qty: 8 tablet, Refills: 2    Associated Diagnoses: Kidney replaced by transplant; Immunosuppression (H)      aspirin  MG EC tablet Take 1 tablet (325 mg) by mouth daily  Qty: 30 tablet, Refills: 11    Associated Diagnoses: Kidney replaced by transplant      acetaminophen (TYLENOL) 325 MG tablet Take 2 tablets (650 mg) by mouth every 4 hours as needed for mild pain or fever  Qty: 100 tablet, Refills: 0    Associated Diagnoses: Kidney replaced by transplant      furosemide (LASIX) 40 MG tablet Take 2 tablets (80 mg) by mouth 2 times daily for 14 days  Qty: 56 tablet, Refills: 0    Associated Diagnoses: Kidney replaced by transplant      senna-docusate (SENOKOT-S;PERICOLACE) 8.6-50 MG per tablet Take 1-2 tablets by mouth daily as needed for constipation  Qty: 100 tablet, Refills: 0    Associated Diagnoses: Kidney replaced by transplant         CONTINUE these medications which have CHANGED    Details   amLODIPine (NORVASC) 5 MG tablet Take 1 tablet (5 mg) by mouth daily  Qty: 30 tablet, Refills: 3    Associated Diagnoses: End stage renal disease (H)         STOP taking these medications       metoprolol (TOPROL-XL) 25 MG 24 hr tablet Comments:   Reason for Stopping:                 Home care nursing referral     Follow Up and recommended labs and tests   Outpatient Hemodialysis  [facility]  [address]  [address]  [place] [address]  Ph:  [phone]  Fax: [phone]    Days: Zxbzax-Ddonodxnj-Yykwll  Time: 12:30pm    Start Date:     Nephrologist:    IF you would need continued hemodialysis in [place]--please transfer back to:     2016  [facility] T-TH-SAT PM  Start per 2728   [facility] [place] [address] Phone #: [phone]  [place]  [address]     Reason for your hospital stay   Patient was admitted after a  donor kidney transplant with ureteral stent placement on 17.     [facility] Follow-up and recommended labs and tests   Over the next 3-5 days you will be seen in the [facility] (ph. [phone]) .  Your labs will be drawn just prior to your appointment between 6:30-7:00 am.  DO NOT take your medications prior to having labs drawn. Please bring all your medications with you from home to take after labs are drawn.    LABS:    CBC, BMP, Mg, Phos and tacrolimus levels to be drawn daily while in ATC, then every Monday, Wednesday, Friday by home health care nurse if arranged, or at an outpatient lab.     FOLLOW UP APPOINTMENTS:  Remember to always bring an updated medication list to all appointments.     An appointment with your transplant surgeon will be scheduled for approximately 2 weeks following discharge from the hospital.  Your transplant surgeon is: Dr. Montelongo.  You will follow up with transplant nephrology in clinic at 1 and 3 months and then annually.     Follow up with primary care provider in 4-8 weeks (after return home). (Pt to schedule)    You have a ureteral stent in place which needs to be removed in 4-6 weeks. If a  does not contact you for this, please contact your transplant coordinator.  Call scheduling at [phone] (option 1) if you have not heard about your appointments within 48 hours after discharge.      WHEN TO CONTACT YOUR  COORDINATOR:  Transplant Coordinator [phone]  Notify your coordinator if you have pain  over your kidney, increased redness or drainage from your incision, fever greater than 100.5F, or decreased urine output.  Notify your coordinator immediately if you are ever unable to take your immunosuppressive medications for any reason.  If it is outside of office hours, please call the hospital switchboard at [phone] and ask to have the kidney transplant surgery fellow paged for urgent medical questions, or present to the emergency department.    OTHER DISCHARGE INSTRUCTIONS:  OCTAVIO plan: Please monitor color and amount of output. Drain will be removed in clinic.     Monitor blood pressure and weight daily initially post transplant.    Walk at least four times a day, lift no greater than 10 pounds for 6-8 weeks from the time of surgery.  No driving while taking narcotics or 3 weeks after surgery.    Diet recommendations post-transplant: Low potassium until kidney function has normalized.  Heart healthy dietary habits long term (low saturated/trans fat, low sodium). High protein diet x 8 weeks. Practice food safety precautions.     Tubes and drains   You are going home with the following tubes or drains: OCTAVIO.  Empty and record output daily.  Tube cares per hospital or home care instructions     Discharge Instructions   Record daily urine output.       Data   Most Recent 3 Creatinines:  Recent Labs   Lab Test  04/27/17   0644  04/26/17   0612  04/25/17   0606   CR  6.08*  4.29*  6.09*     Most Recent 3 Hemoglobins:  Recent Labs   Lab Test  04/27/17   0644  04/26/17   1416  04/26/17   0612   HGB  7.8*  8.3*  6.5*

## 2017-04-27 NOTE — PROGRESS NOTES
[facility]  Met with pt and family to discuss plans for HC.  Pt to be discharged home 4/27  and has agreed to have [facility] follow with services of .  Patient care support center processing referral.  Pt and family verbalized understanding that initial visit is scheduled for approximately one week after ATC visits.    Pt has 24 hour phone number for [facility] for any questions or concerns.    Lorene Thurman RN, BSN  [facility] Liaison  [phone]

## 2017-04-27 NOTE — PROGRESS NOTES
"Reason for Assessment:  Nutrition education regarding post transplant diet.   Diet History:  Patient familiar with following a renal diet pre-transplant.  Prefers to not eat much meat or eggs.  She calls herself the \"Mrs Layo yip\" and follows a low fat, low sodium/heart healthy diet at baseline.   Nutrition Diagnosis:  Food- and nutrition-related knowledge deficit r/t no previous knowledge of post transplant diet guidelines AEB patient verbal report.   Interventions:  Provided instruction to patient and spouse on high protein sources with increased protein needs x 8 weeks, consuming heart healthy diet low in saturated/trans fat and sodium, food safety precautions.  Provided information/handout on monitoring K+ in diet as well as team would like her to continue following a low K+ diet (likely temporarily) at discharge.    Will send Ensure Clear supplements for patient to try.  Discussed protein powders to use as well.   Pt verbalized understanding of diet following education and denied further nutritional questions.    Goals:   Patient will verbalize understanding of education provided.  Follow-up:    Patient to ask any further nutrition-related questions before discharge.  In addition, pt may request outpatient RD appointment.    Apple Hernandez MS, RD, LD  Pager [phone]        "

## 2017-04-27 NOTE — DISCHARGE INSTRUCTIONS
Diet recommendations post-transplant: High protein diet x 8 weeks.  Heart healthy dietary habits long term (low saturated/trans fat, low sodium). Practice food safety precautions. See nutrition section of transplant handbook for more information.    ________________________________________________________  Discharge RN please fax discharge orders to home care agency: [facility]   and  [facility]  ________________________________________________________

## 2017-04-28 ENCOUNTER — OFFICE VISIT (OUTPATIENT)
Dept: INFUSION THERAPY | Facility: CLINIC | Age: 42
End: 2017-04-28
Attending: INTERNAL MEDICINE
Payer: COMMERCIAL

## 2017-04-28 ENCOUNTER — TELEPHONE (OUTPATIENT)
Dept: PHARMACY | Facility: CLINIC | Age: 42
End: 2017-04-28

## 2017-04-28 ENCOUNTER — CARE COORDINATION (OUTPATIENT)
Dept: CARE COORDINATION | Facility: CLINIC | Age: 42
End: 2017-04-28

## 2017-04-28 VITALS
BODY MASS INDEX: 22.95 KG/M2 | DIASTOLIC BLOOD PRESSURE: 90 MMHG | WEIGHT: 125.5 LBS | TEMPERATURE: 97.6 F | RESPIRATION RATE: 16 BRPM | SYSTOLIC BLOOD PRESSURE: 162 MMHG

## 2017-04-28 DIAGNOSIS — T86.19 DELAYED GRAFT FUNCTION OF KIDNEY: ICD-10-CM

## 2017-04-28 DIAGNOSIS — Z48.298 AFTERCARE FOLLOWING ORGAN TRANSPLANT: ICD-10-CM

## 2017-04-28 DIAGNOSIS — Z94.0 KIDNEY REPLACED BY TRANSPLANT: Primary | ICD-10-CM

## 2017-04-28 DIAGNOSIS — Z94.0 DECEASED-DONOR KIDNEY TRANSPLANT: ICD-10-CM

## 2017-04-28 DIAGNOSIS — D84.9 IMMUNOSUPPRESSION (H): Primary | ICD-10-CM

## 2017-04-28 LAB
ANION GAP SERPL CALCULATED.3IONS-SCNC: 10 MMOL/L (ref 3–14)
BASOPHILS # BLD AUTO: 0 10E9/L (ref 0–0.2)
BASOPHILS NFR BLD AUTO: 0.2 %
BUN SERPL-MCNC: 68 MG/DL (ref 7–30)
CALCIUM SERPL-MCNC: 8.4 MG/DL (ref 8.5–10.1)
CHLORIDE SERPL-SCNC: 100 MMOL/L (ref 94–109)
CO2 SERPL-SCNC: 23 MMOL/L (ref 20–32)
CREAT FLD-MCNC: 7.9 MG/DL
CREAT SERPL-MCNC: 7.45 MG/DL (ref 0.52–1.04)
CREAT UR-MCNC: 14 MG/DL
DIFFERENTIAL METHOD BLD: ABNORMAL
EOSINOPHIL # BLD AUTO: 0 10E9/L (ref 0–0.7)
EOSINOPHIL NFR BLD AUTO: 0.4 %
ERYTHROCYTE [DISTWIDTH] IN BLOOD BY AUTOMATED COUNT: 15.6 % (ref 10–15)
GFR SERPL CREATININE-BSD FRML MDRD: 6 ML/MIN/1.7M2
GLUCOSE SERPL-MCNC: 79 MG/DL (ref 70–99)
HCT VFR BLD AUTO: 24.9 % (ref 35–47)
HGB BLD-MCNC: 8.3 G/DL (ref 11.7–15.7)
IMM GRANULOCYTES # BLD: 0 10E9/L (ref 0–0.4)
IMM GRANULOCYTES NFR BLD: 0.4 %
LYMPHOCYTES # BLD AUTO: 0.2 10E9/L (ref 0.8–5.3)
LYMPHOCYTES NFR BLD AUTO: 3.3 %
MAGNESIUM SERPL-MCNC: 1.8 MG/DL (ref 1.6–2.3)
MCH RBC QN AUTO: 29.2 PG (ref 26.5–33)
MCHC RBC AUTO-ENTMCNC: 33.3 G/DL (ref 31.5–36.5)
MCV RBC AUTO: 88 FL (ref 78–100)
MONOCYTES # BLD AUTO: 0.3 10E9/L (ref 0–1.3)
MONOCYTES NFR BLD AUTO: 5.9 %
NEUTROPHILS # BLD AUTO: 4.4 10E9/L (ref 1.6–8.3)
NEUTROPHILS NFR BLD AUTO: 89.8 %
NRBC # BLD AUTO: 0 10*3/UL
NRBC BLD AUTO-RTO: 0 /100
PHOSPHATE SERPL-MCNC: 5.7 MG/DL (ref 2.5–4.5)
PLATELET # BLD AUTO: 153 10E9/L (ref 150–450)
POTASSIUM SERPL-SCNC: 4 MMOL/L (ref 3.4–5.3)
PROT UR-MCNC: 0.5 G/L
PROT/CREAT 24H UR: 3.72 G/G CR (ref 0–0.2)
RBC # BLD AUTO: 2.84 10E12/L (ref 3.8–5.2)
SODIUM SERPL-SCNC: 133 MMOL/L (ref 133–144)
SPECIMEN SOURCE FLD: NORMAL
TACROLIMUS BLD-MCNC: 4 UG/L (ref 5–15)
TME LAST DOSE: ABNORMAL H
WBC # BLD AUTO: 4.9 10E9/L (ref 4–11)

## 2017-04-28 PROCEDURE — 36415 COLL VENOUS BLD VENIPUNCTURE: CPT

## 2017-04-28 PROCEDURE — 83735 ASSAY OF MAGNESIUM: CPT | Performed by: PHYSICIAN ASSISTANT

## 2017-04-28 PROCEDURE — 82570 ASSAY OF URINE CREATININE: CPT | Performed by: TRANSPLANT SURGERY

## 2017-04-28 PROCEDURE — 99215 OFFICE O/P EST HI 40 MIN: CPT

## 2017-04-28 PROCEDURE — 80197 ASSAY OF TACROLIMUS: CPT | Performed by: PHYSICIAN ASSISTANT

## 2017-04-28 PROCEDURE — 84100 ASSAY OF PHOSPHORUS: CPT | Performed by: PHYSICIAN ASSISTANT

## 2017-04-28 PROCEDURE — 84156 ASSAY OF PROTEIN URINE: CPT | Performed by: TRANSPLANT SURGERY

## 2017-04-28 PROCEDURE — 80048 BASIC METABOLIC PNL TOTAL CA: CPT | Performed by: PHYSICIAN ASSISTANT

## 2017-04-28 PROCEDURE — 85025 COMPLETE CBC W/AUTO DIFF WBC: CPT | Performed by: PHYSICIAN ASSISTANT

## 2017-04-28 RX ORDER — TACROLIMUS 0.5 MG/1
0.5 CAPSULE ORAL 2 TIMES DAILY
Qty: 60 CAPSULE | Refills: 11 | Status: SHIPPED | OUTPATIENT
Start: 2017-04-28 | End: 2017-05-09

## 2017-04-28 NOTE — PROGRESS NOTES
"Rosibel Willingham came to [facility] today for a lab and assess following a  donor transplant on 17. ATN likely due to 25 hour cold ischemia time. Required HD on POD 0 and 2. Scheduled for HD MWF if needed.       Discharge date: 17  Transplant coordinator: Chinyere Burnham  Phone number patient can be reached at: [phone]      Physical Assessment:  See physical assessment located under \"Document Flowsheets\".  Incision site: dermabond c/d/i. Patient able to verbalize s/s of infection to watch for.   Lines: n/a  Huang: n/a  J/P: removed 15 mls this morning of serosanguinous drainage. Total of 365 mls out in the last 12 hours.   Urine clarity: reports small clots yesterday but urine has been clearing up to a light pink to yellow.   Hydration: drinking to thirst: patient still possibly needing HD.   Nutrition: Patient denies N/V and slowly improving appetite. Discussed continuing her low potassium and high protein diet.    Last BM: yesterday: loose: holding senna-Colace today. If no BM by end of day patient will take senna-colace tomorrow.   Pain: reports pain 4/10. Taking tylenol as needed with adequate pain control.      Laboratory tests: Standard labs drawn.    Plan of care for today:   Labs and assessment reviewed with Dr. Beltran today.     Medications reviewed: patient able to identify medication, dose, frequency, and indication for all meds.     Patient had medications set up prior to arrival: RN double checked set up.     Patient seen by specialty pharmacy today.     Patient wanting to transfer f/u back home with Dr. Sheriff: discussed with Dr. Beltran today: once patient is cleared by transplant here it will be ok to transfer.     Reviewed fluid intake requirements with Dr. Beltran: patient to drink to thirst    Plan for biopsy POD 10 if still on HD.    Patient to go to HD today and run for 2-3hours    Reviewed high OCTAVIO drain output with Dr. Whiteside: Per Dr. Whiteside creatinine drain fluid sent a long with " creatinine urine prior to DC'ing.     Patient's coordinator Chinyere VENTURA. Unable to see patient today. Will need to meet Chinyere on Monday.        Medication changes:   Prograf 0.5 mg tabs ordered.  If patient has > 3L of Urine output drop lasix down to 40 mg BID         Patient education:    The following teaching topics were addressed: Incisional care, Signs/symptoms of infection, Good handwashing, Medications (purposes, doses and times of administration), Phone numbers to call with concenrs (Transplant coordinator, [address] and [facility]), Plan of care and Drain care   Patient verbalized understanding and all questions answered.    Drug level:  Prograf level today reviewed with Dr fontaine who gave orders to 2.5 mg BID.  Patient was updated with this information via voice message.     Face to face time: 25 minutes.     Discharge Plan    Pt will follow up with labs at 0800 on Sunday and [facility] Monday.   Discharge instructions reviewed with patient: YES  Patient/Representative verbalized understanding, all questions answered: NO    Discharged from unit at 1100 with whom: spouse to hotel.    eLah Mcgee RN

## 2017-04-28 NOTE — PATIENT INSTRUCTIONS
Dear Rosibel Willingham    Thank you for choosing [facility] ([facility]) for your transplant cares.  The following information is a summary of our appointment as well as important reminders.      Please make sure your phone is available today because I will call to update you with your anti-rejection drug levels and possibly make changes to your anti-rejection dosages.    Coordinator hotline: [phone]  Cindy number: [phone]  [facility]: [phone]  7A: [phone]: call and ask to talk to the kidney surgeon on call   [phone]: [facility]  line: call and ask to talk to kidney surgeon on call.     * If you have > 3 liters of urine output in 24 hours decrease your lasix    to 40 mg 2 times daily.   * Return Sunday morning for labs at 8:00 am   * Keep accurate track of your urine output  * go to dialysis today and run for 2-3 hours removing 1/2-1 kg         We look forward in seeing you on your next appointment here at [facility].  Please don t hesitate to call us at [phone] to reschedule any of your appointments or to speak with one of the [facility] registered nurses.  It was a pleasure taking care of you today.    Sincerely,  Leah Mcgee, NICHOLAS  [facility]  [address] [place] [address]  [address]  Phone:  [phone]

## 2017-04-28 NOTE — MR AVS SNAPSHOT
After Visit Summary   4/28/2017    Rosibel Willingham    MRN: [id number]           Patient Information     Date Of Birth          1975        Visit Information        Provider Department      4/28/2017 7:00 AM OMAR 42 ATC; OMAR [facility] Procedure        Today's Diagnoses     Kidney replaced by transplant    -  1      Care Instructions    Dear Rosibel Willingham    Thank you for choosing [facility] ([facility]) for your transplant cares.  The following information is a summary of our appointment as well as important reminders.      Please make sure your phone is available today because I will call to update you with your anti-rejection drug levels and possibly make changes to your anti-rejection dosages.    Coordinator hotline: [phone]  [facility] number: [phone]  [facility] Care: [phone]  7A: [phone]: call and ask to talk to the kidney surgeon on call   [phone]: [facility]  line: call and ask to talk to kidney surgeon on call.     * If you have > 3 liters of urine output in 24 hours decrease your lasix    to 40 mg 2 times daily.   * Return Sunday morning for labs at 8:00 am   * Keep accurate track of your urine output  * go to dialysis today and run for 2-3 hours removing 1/2-1 kg         We look forward in seeing you on your next appointment here at [facility].  Please don t hesitate to call us at [phone] to reschedule any of your appointments or to speak with one of the [facility] registered nurses.  It was a pleasure taking care of you today.    Sincerely,  Leah Mcgee, RN  [facility]  [address] [place] [address]  [address]  Phone:  [phone]        Follow-ups after your visit        Your next 10 appointments already scheduled     Apr 30, 2017  8:00 AM NOÉ   [facility] Lab Draw with OMAR  MASONIC LAB DRAW   [facility] Masonic Lab Draw ([facility])    [address] [id number] [phone]            May 01, 2017  7:00 AM CDT   (Arrive by 6:45 AM)   New Transplant Visit with UC SPEC INFUSION, UC 43 ATC   [facility] Specialty and Procedure ([facility])    [address] [place] [id number] [phone]            May 01, 2017  8:00 AM CDT   (Arrive by 7:45 AM)   Kidney Transplant Discharge with Benjamin Beltran MD   [facility] Specialty and Procedure ([facility])    [address] [place] [id number] [phone]            May 15, 2017  2:00 PM CDT   (Arrive by 1:45 PM)   Kidney Post Op with Leanne Montelongo MD   [facility] Solid Organ Transplant ([facility])    [address] [place] [id number] [phone]            May 22, 2017  1:00 PM CDT   (Arrive by 12:30 PM)   Return Kidney Transplant with Benjamin Beltran MD   [facility] Nephrology ([facility])    [address] [place] [id number] [phone]            Jul 24, 2017  1:10 PM CDT   (Arrive by 12:40 PM)   Return Kidney Transplant with Benjamin Beltran MD   [facility] Nephrology ([facility])    [address] [place] [id number] [phone]              Who to contact     If you have questions or need follow up information about today's clinic visit or your schedule please contact [facility] SPECIALTY AND PROCEDURE directly at [phone].  Normal or non-critical lab and imaging results will be communicated to you by MyChart, letter or phone within 4 business days after the clinic has received the results. If you do not hear from us within 7 days, please contact the  "clinic through [email or web address] or phone. If you have a critical or abnormal lab result, we will notify you by phone as soon as possible.  Submit refill requests through [employer] or call your pharmacy and they will forward the refill request to us. Please allow 3 business days for your refill to be completed.          Additional Information About Your Visit        [email or web address] Information     [employer] lets you send messages to your doctor, view your test results, renew your prescriptions, schedule appointments and more. To sign up, go to [email or web address] . Click on \"Log in\" on the left side of the screen, which will take you to the Welcome page. Then click on \"Sign up Now\" on the right side of the page.     You will be asked to enter the access code listed below, as well as some personal information. Please follow the directions to create your username and password.     Your access code is: [email or web address]  Expires: 2017  2:16 PM     Your access code will  in 90 days. If you need help or a new code, please call your [place] clinic or [phone].        Care EveryWhere ID     This is your Care EveryWhere ID. This could be used by other organizations to access your [facility] medical records  [id number]        Your Vitals Were     Temperature Respirations BMI (Body Mass Index)             97.6  F (36.4  C) (Oral) 16 22.95 kg/m2          Blood Pressure from Last 3 Encounters:   17 162/90   17 (!) 160/97   16 (!) 128/91    Weight from Last 3 Encounters:   17 56.9 kg (125 lb 8 oz)   17 58 kg (127 lb 14.4 oz)   16 51 kg (112 lb 8 oz)              We Performed the Following     Basic metabolic panel     CBC with platelets differential     Magnesium     Phosphorus     Tacrolimus level          Today's Medication Changes          These changes are accurate as of: 17 10:47 AM.  If you have any questions, ask your nurse or doctor.               These medicines have changed or have " updated prescriptions.        Dose/Directions    * tacrolimus 1 MG capsule   Commonly known as:  PROGRAF - GENERIC EQUIVALENT   This may have changed:  Another medication with the same name was added. Make sure you understand how and when to take each.   Used for:  Kidney replaced by transplant, Immunosuppression (H)        Dose:  2 mg   Take 2 capsules (2 mg) by mouth 2 times daily   Quantity:  120 capsule   Refills:  11       * tacrolimus 0.5 MG capsule   Commonly known as:  PROGRAF - GENERIC EQUIVALENT   This may have changed:  You were already taking a medication with the same name, and this prescription was added. Make sure you understand how and when to take each.   Used for:  Kidney replaced by transplant        Dose:  0.5 mg   Take 1 capsule (0.5 mg) by mouth 2 times daily   Quantity:  60 capsule   Refills:  11       * Notice:  This list has 2 medication(s) that are the same as other medications prescribed for you. Read the directions carefully, and ask your doctor or other care provider to review them with you.         Where to get your medicines      These medications were sent to [facility] [address] [address]     Phone:  [phone]     tacrolimus 0.5 MG capsule                Primary Care Provider    Physician No Ref-Primary       No address on file        Thank you!     Thank you for choosing [facility] SPECIALTY AND PROCEDURE  for your care. Our goal is always to provide you with excellent care. Hearing back from our patients is one way we can continue to improve our services. Please take a few minutes to complete the written survey that you may receive in the mail after your visit with us. Thank you!             Your Updated Medication List - Protect others around you: Learn how to safely use, store and throw away your medicines at www.disposemymeds.org.          This list is accurate as of:  4/28/17 10:47 AM.  Always use your most recent med list.                   Brand Name Dispense Instructions for use    acetaminophen 325 MG tablet    TYLENOL    100 tablet    Take 2 tablets (650 mg) by mouth every 4 hours as needed for mild pain or fever       amLODIPine 5 MG tablet    NORVASC    30 tablet    Take 1 tablet (5 mg) by mouth daily       aspirin 325 MG EC tablet     30 tablet    Take 1 tablet (325 mg) by mouth daily       furosemide 40 MG tablet    LASIX    56 tablet    Take 2 tablets (80 mg) by mouth 2 times daily for 14 days       mycophenolate 250 MG capsule    CELLCEPT - GENERIC EQUIVALENT    180 capsule    Take 3 capsules (750 mg) by mouth 2 times daily       senna-docusate 8.6-50 MG per tablet    SENOKOT-S;PERICOLACE    100 tablet    Take 1-2 tablets by mouth daily as needed for constipation       sulfamethoxazole-trimethoprim 400-80 MG per tablet    BACTRIM/SEPTRA    12 tablet    Take 1 tablet by mouth Every Mon, Wed, Fri Morning       * tacrolimus 1 MG capsule    PROGRAF - GENERIC EQUIVALENT    120 capsule    Take 2 capsules (2 mg) by mouth 2 times daily       * tacrolimus 0.5 MG capsule    PROGRAF - GENERIC EQUIVALENT    60 capsule    Take 1 capsule (0.5 mg) by mouth 2 times daily       valGANciclovir 450 MG tablet    VALCYTE    8 tablet    Take 1 tablet (450 mg) by mouth twice a week       * Notice:  This list has 2 medication(s) that are the same as other medications prescribed for you. Read the directions carefully, and ask your doctor or other care provider to review them with you.

## 2017-04-28 NOTE — PROGRESS NOTES
Patient has clinic visit within 24-48 hours of Discharge so no post DC follow up call is needed          Apr 28, 2017 7:00 AM CDT   (Arrive by 6:45 AM)   New Transplant Visit with UC SPEC INFUSION, UC 42 ATC   [facility] Specialty and Procedure ([facility])     [address] [place] [id number] [phone]                 Apr 28, 2017 8:00 AM CDT   (Arrive by 7:45 AM)   Kidney Transplant Discharge with Benjamin Beltran MD   [facility] Specialty and Procedure ([facility])     [address] [place] [id number] [phone]

## 2017-04-30 DIAGNOSIS — N18.6 END STAGE RENAL DISEASE ON DIALYSIS (H): ICD-10-CM

## 2017-04-30 DIAGNOSIS — Z94.0 DECEASED-DONOR KIDNEY TRANSPLANT: Primary | ICD-10-CM

## 2017-04-30 DIAGNOSIS — Z99.2 END STAGE RENAL DISEASE ON DIALYSIS (H): ICD-10-CM

## 2017-04-30 DIAGNOSIS — I12.9 HYPERTENSIVE NEPHROSCLEROSIS: ICD-10-CM

## 2017-04-30 DIAGNOSIS — Z76.82 ORGAN TRANSPLANT CANDIDATE: ICD-10-CM

## 2017-04-30 LAB
ANION GAP SERPL CALCULATED.3IONS-SCNC: 13 MMOL/L (ref 3–14)
BASOPHILS # BLD AUTO: 0 10E9/L (ref 0–0.2)
BASOPHILS NFR BLD AUTO: 0.2 %
BUN SERPL-MCNC: 62 MG/DL (ref 7–30)
CALCIUM SERPL-MCNC: 9.1 MG/DL (ref 8.5–10.1)
CHLORIDE SERPL-SCNC: 102 MMOL/L (ref 94–109)
CO2 SERPL-SCNC: 25 MMOL/L (ref 20–32)
CREAT SERPL-MCNC: 7 MG/DL (ref 0.52–1.04)
DIFFERENTIAL METHOD BLD: ABNORMAL
EOSINOPHIL # BLD AUTO: 0.2 10E9/L (ref 0–0.7)
EOSINOPHIL NFR BLD AUTO: 3.3 %
ERYTHROCYTE [DISTWIDTH] IN BLOOD BY AUTOMATED COUNT: 15.6 % (ref 10–15)
GFR SERPL CREATININE-BSD FRML MDRD: 6 ML/MIN/1.7M2
GLUCOSE SERPL-MCNC: 108 MG/DL (ref 70–99)
HCT VFR BLD AUTO: 27.4 % (ref 35–47)
HGB BLD-MCNC: 9 G/DL (ref 11.7–15.7)
IMM GRANULOCYTES # BLD: 0 10E9/L (ref 0–0.4)
IMM GRANULOCYTES NFR BLD: 0.4 %
LYMPHOCYTES # BLD AUTO: 0.2 10E9/L (ref 0.8–5.3)
LYMPHOCYTES NFR BLD AUTO: 3.8 %
MAGNESIUM SERPL-MCNC: 1.8 MG/DL (ref 1.6–2.3)
MCH RBC QN AUTO: 29 PG (ref 26.5–33)
MCHC RBC AUTO-ENTMCNC: 32.8 G/DL (ref 31.5–36.5)
MCV RBC AUTO: 88 FL (ref 78–100)
MONOCYTES # BLD AUTO: 0.4 10E9/L (ref 0–1.3)
MONOCYTES NFR BLD AUTO: 7.9 %
NEUTROPHILS # BLD AUTO: 4.6 10E9/L (ref 1.6–8.3)
NEUTROPHILS NFR BLD AUTO: 84.4 %
NRBC # BLD AUTO: 0 10*3/UL
NRBC BLD AUTO-RTO: 0 /100
PHOSPHATE SERPL-MCNC: 5.8 MG/DL (ref 2.5–4.5)
PLATELET # BLD AUTO: 240 10E9/L (ref 150–450)
POTASSIUM SERPL-SCNC: 3.8 MMOL/L (ref 3.4–5.3)
RBC # BLD AUTO: 3.1 10E12/L (ref 3.8–5.2)
SODIUM SERPL-SCNC: 139 MMOL/L (ref 133–144)
TACROLIMUS BLD-MCNC: 7.3 UG/L (ref 5–15)
TME LAST DOSE: NORMAL H
WBC # BLD AUTO: 5.5 10E9/L (ref 4–11)

## 2017-04-30 PROCEDURE — 80197 ASSAY OF TACROLIMUS: CPT | Performed by: INTERNAL MEDICINE

## 2017-04-30 PROCEDURE — 85025 COMPLETE CBC W/AUTO DIFF WBC: CPT | Performed by: INTERNAL MEDICINE

## 2017-04-30 PROCEDURE — 83735 ASSAY OF MAGNESIUM: CPT | Performed by: INTERNAL MEDICINE

## 2017-04-30 PROCEDURE — 84100 ASSAY OF PHOSPHORUS: CPT | Performed by: INTERNAL MEDICINE

## 2017-04-30 PROCEDURE — 80048 BASIC METABOLIC PNL TOTAL CA: CPT | Performed by: INTERNAL MEDICINE

## 2017-04-30 NOTE — NURSING NOTE
Chief Complaint   Patient presents with     Blood Draw     venipuncture     Labs drawn see flow sheet.  JAM DIANA, CMA

## 2017-05-01 ENCOUNTER — OFFICE VISIT (OUTPATIENT)
Dept: INFUSION THERAPY | Facility: CLINIC | Age: 42
End: 2017-05-01
Attending: INTERNAL MEDICINE
Payer: COMMERCIAL

## 2017-05-01 ENCOUNTER — TELEPHONE (OUTPATIENT)
Dept: TRANSPLANT | Facility: CLINIC | Age: 42
End: 2017-05-01

## 2017-05-01 VITALS
RESPIRATION RATE: 18 BRPM | TEMPERATURE: 97.8 F | BODY MASS INDEX: 20.38 KG/M2 | DIASTOLIC BLOOD PRESSURE: 97 MMHG | OXYGEN SATURATION: 99 % | WEIGHT: 111.4 LBS | SYSTOLIC BLOOD PRESSURE: 170 MMHG

## 2017-05-01 DIAGNOSIS — D84.9 IMMUNOSUPPRESSION (H): ICD-10-CM

## 2017-05-01 DIAGNOSIS — Z94.0 STATUS POST KIDNEY TRANSPLANT: Primary | ICD-10-CM

## 2017-05-01 DIAGNOSIS — N18.6 END STAGE RENAL DISEASE (H): ICD-10-CM

## 2017-05-01 DIAGNOSIS — Z94.0 KIDNEY REPLACED BY TRANSPLANT: Primary | ICD-10-CM

## 2017-05-01 DIAGNOSIS — Z94.0 KIDNEY REPLACED BY TRANSPLANT: ICD-10-CM

## 2017-05-01 DIAGNOSIS — Z48.298 AFTERCARE FOLLOWING ORGAN TRANSPLANT: ICD-10-CM

## 2017-05-01 PROBLEM — T86.19 DELAYED GRAFT FUNCTION OF KIDNEY: Status: ACTIVE | Noted: 2017-05-01

## 2017-05-01 LAB
ANION GAP SERPL CALCULATED.3IONS-SCNC: 15 MMOL/L (ref 3–14)
BASOPHILS # BLD AUTO: 0 10E9/L (ref 0–0.2)
BASOPHILS NFR BLD AUTO: 0.2 %
BUN SERPL-MCNC: 73 MG/DL (ref 7–30)
CALCIUM SERPL-MCNC: 8.6 MG/DL (ref 8.5–10.1)
CHLORIDE SERPL-SCNC: 101 MMOL/L (ref 94–109)
CO2 SERPL-SCNC: 22 MMOL/L (ref 20–32)
CREAT SERPL-MCNC: 7.32 MG/DL (ref 0.52–1.04)
CROSSMATCH RESULT: NORMAL
CROSSMATCH RESULT: NORMAL
DIFFERENTIAL METHOD BLD: ABNORMAL
EOSINOPHIL # BLD AUTO: 0.2 10E9/L (ref 0–0.7)
EOSINOPHIL NFR BLD AUTO: 2.8 %
ERYTHROCYTE [DISTWIDTH] IN BLOOD BY AUTOMATED COUNT: 15.3 % (ref 10–15)
GFR SERPL CREATININE-BSD FRML MDRD: 6 ML/MIN/1.7M2
GLUCOSE SERPL-MCNC: 94 MG/DL (ref 70–99)
HCT VFR BLD AUTO: 26.1 % (ref 35–47)
HGB BLD-MCNC: 8.8 G/DL (ref 11.7–15.7)
IMM GRANULOCYTES # BLD: 0.1 10E9/L (ref 0–0.4)
IMM GRANULOCYTES NFR BLD: 0.8 %
LYMPHOCYTES # BLD AUTO: 0.3 10E9/L (ref 0.8–5.3)
LYMPHOCYTES NFR BLD AUTO: 4.4 %
MAGNESIUM SERPL-MCNC: 1.7 MG/DL (ref 1.6–2.3)
MCH RBC QN AUTO: 29 PG (ref 26.5–33)
MCHC RBC AUTO-ENTMCNC: 33.7 G/DL (ref 31.5–36.5)
MCV RBC AUTO: 86 FL (ref 78–100)
MONOCYTES # BLD AUTO: 0.5 10E9/L (ref 0–1.3)
MONOCYTES NFR BLD AUTO: 7.5 %
NEUTROPHILS # BLD AUTO: 5.5 10E9/L (ref 1.6–8.3)
NEUTROPHILS NFR BLD AUTO: 84.3 %
NRBC # BLD AUTO: 0 10*3/UL
NRBC BLD AUTO-RTO: 0 /100
PHOSPHATE SERPL-MCNC: 6.3 MG/DL (ref 2.5–4.5)
PLATELET # BLD AUTO: 283 10E9/L (ref 150–450)
POTASSIUM SERPL-SCNC: 3.6 MMOL/L (ref 3.4–5.3)
RBC # BLD AUTO: 3.03 10E12/L (ref 3.8–5.2)
SODIUM SERPL-SCNC: 138 MMOL/L (ref 133–144)
TACROLIMUS BLD-MCNC: 6.8 UG/L (ref 5–15)
TME LAST DOSE: NORMAL H
WBC # BLD AUTO: 6.5 10E9/L (ref 4–11)

## 2017-05-01 PROCEDURE — 99215 OFFICE O/P EST HI 40 MIN: CPT | Mod: 25

## 2017-05-01 PROCEDURE — 96360 HYDRATION IV INFUSION INIT: CPT

## 2017-05-01 PROCEDURE — 84100 ASSAY OF PHOSPHORUS: CPT | Performed by: INTERNAL MEDICINE

## 2017-05-01 PROCEDURE — 96361 HYDRATE IV INFUSION ADD-ON: CPT

## 2017-05-01 PROCEDURE — 80197 ASSAY OF TACROLIMUS: CPT | Performed by: INTERNAL MEDICINE

## 2017-05-01 PROCEDURE — 85025 COMPLETE CBC W/AUTO DIFF WBC: CPT | Performed by: INTERNAL MEDICINE

## 2017-05-01 PROCEDURE — 83735 ASSAY OF MAGNESIUM: CPT | Performed by: INTERNAL MEDICINE

## 2017-05-01 PROCEDURE — 25000125 ZZHC RX 250: Mod: ZF | Performed by: INTERNAL MEDICINE

## 2017-05-01 PROCEDURE — 80048 BASIC METABOLIC PNL TOTAL CA: CPT | Performed by: INTERNAL MEDICINE

## 2017-05-01 PROCEDURE — 25000128 H RX IP 250 OP 636: Mod: ZF | Performed by: INTERNAL MEDICINE

## 2017-05-01 RX ORDER — AMLODIPINE BESYLATE 5 MG/1
10 TABLET ORAL DAILY
Qty: 30 TABLET | Refills: 3 | Status: SHIPPED | OUTPATIENT
Start: 2017-05-01 | End: 2017-05-08

## 2017-05-01 RX ORDER — ONDANSETRON 4 MG/1
4 TABLET, FILM COATED ORAL EVERY 8 HOURS PRN
Qty: 30 TABLET | Refills: 0 | Status: SHIPPED | OUTPATIENT
Start: 2017-05-01 | End: 2017-05-17

## 2017-05-01 RX ORDER — ONDANSETRON 4 MG/1
4 TABLET, ORALLY DISINTEGRATING ORAL ONCE
Status: COMPLETED | OUTPATIENT
Start: 2017-05-01 | End: 2017-05-01

## 2017-05-01 RX ADMIN — SODIUM CHLORIDE 2000 ML: 9 INJECTION, SOLUTION INTRAVENOUS at 09:41

## 2017-05-01 RX ADMIN — ONDANSETRON 4 MG: 4 TABLET, ORALLY DISINTEGRATING ORAL at 08:51

## 2017-05-01 NOTE — PROGRESS NOTES
"Rosibel Willingham came to [facility] today for a lab and assess following a DDKT transplant on 4/23/17.      Discharge date: 4/27/17  Transplant coordinator: Chinyere Burnham  Phone number patient can be reached at: Rosibel cell: [phone] alright to leave a message if unavailable to take call    BP remains elevated. Orthostatic results: sitting= 171/102-91 siqpfxnh=061/97-93. Pt reports feeling \"bounding pulse\". No edema observed to BLE. Having ongoing difficulty with nutritional intake, with aversion to smell and taste of food, despite feeling of hunger. Pt called pt hotline yesterday and received recommendation to decrease Lasix from 80 mg BID to 40 mg BID; Lasix on hold per Dr. Cohen/Dr. Beltran starting today until ordered to resume. Furthermore, pt instructed by Dr. Cohen to stop dialysis for now, until notified by Nephrology of change in plan.      Physical Assessment:  See physical assessment located under \"Document Flowsheets\".  Incision site: C/D/I and secured with Dermabond; insertion site of J/P drain clear of infection; area cleansed with Micro-klenz spray and new dressing replaced  Lines: 140 ml output on 4/29/17; pt reported output of 50 ml during day hours yesterday on 4/30/17; approximately 55 ml serous sanguineous output from HS last evening to 1130 this morning (about 12 hours)  Huang: n/a  Urine clarity: pt reported very clear yellow urine; copious output of about 3200 ml over past 24 hours  Hydration: adequate intake of 2+ liters of non-caffeinated fluid daily  Nutrition: very limited and inadequate intake; pt responds with gag reflex to scent and taste of food, despite being hungry and having desire to eat; attempting to eat small meals with difficulty; no emesis, no acid reflux, N pronounced today, Zofran DST given at [facility] appt with moderate success; able to consume 1/2 blueberry muffin and small turkey sandwich this AM; Rx written for home supply  Last BM: 3 BM over past 24 hours, mostly liquid d/t limited " "solid food intake   Pain: no c/o pain with rest, rates as \"4/10\" with movement and intermittently wakes from sleep d/t pain; managing well with use of oral Tylenol   HD: pt had hemodialysis performed on Friday 4/28/17 over a 2 hour run, resulting in output of 750 ml  Laboratory tests: Standard labs drawn.    Plan of care for today: lab and assessment, rounding by Nephrology, review of lab results, incisional and drain care, ongoing checklist education, IV infusion of 2 L NS    Medication changes: 1. Increase Norvasc from 5 mg to 10 mg daily.  2. HOLD Lasix 40 mg 2x/day for now; do not resume until notified by MD.   3. Try Zofran 4 mg tablet q 8 hours as needed for nausea.   Changes made to medication card.     Medications administered: Pt took all scheduled AM medications following lab draw. Also received Zofran 4 mg ODT and 2 L NS via IV today.       Patient education:    The following teaching topics were addressed: Importance of drinking 2L of non-caffeinated fluids daily, Incisional care, Signs/symptoms of infection, Good handwashing, Medications (purposes, doses and times of administration), 6D discharge check list, Plan of care and Drain care, encouraged to maintain fluid intake and attempt to increase nutritional intake with use of PRN Zofran and small frequent meals   Patient and spouse verbalized understanding and all questions answered.    Drug level:  Prograf level today reviewed with Dr Cohen who gave orders to increase dose from 2.5 mg q 12 hours to 3 mg q 12 hours .  Patient was updated with this information via voice message left on personal cell phone.     Face to face time: 25 minutes  Discharge Plan    Pt will follow up with labs and assessment in [facility] tomorrow. TXP Coordinator Chinyere Burnham out of office today. Please contact on Tuesday for pt visit.   Discharge instructions reviewed with patient: YES  Patient/Representative verbalized understanding, all questions answered: YES    Discharged from " unit at 11:55 with whom: spoue to home.    Mellissa Antunez RN       Administrations This Visit     ondansetron (ZOFRAN-ODT) ODT tab 4 mg     Admin Date Action Dose Route Administered By             05/01/2017 Given 4 mg Oral Mellissa Antunez, RN

## 2017-05-01 NOTE — PROGRESS NOTES
Patient's blood pressure trending down slightly 15 minutes after taking her blood pressure medication. Patient reports she will check her blood pressure 1-2 hours after she gets home and agrees that if her SBP rises >180 or has chest pain/headache to go to the ER.     Leah Mcgee RN   BP (!) 170/97  Temp 97.8  F (36.6  C) (Oral)  Resp 18  Wt 50.5 kg (111 lb 6.4 oz)  SpO2 99%  BMI 20.38 kg/m2

## 2017-05-01 NOTE — TELEPHONE ENCOUNTER
Dr Montelongo  JJ stent- right iliac fossa  May 22, 2017- 0800 check in at 0600  MAC    Informed pt of date and time- Per patient she is planning to be able to go back to [place] in a few weeks, and said that JJ stent can be removed locally.  Will keep the date until I hear it is ok to cancel. No letter sent at this time.

## 2017-05-01 NOTE — PATIENT INSTRUCTIONS
Dear Rosibel Willingham    Thank you for choosing [facility] ([facility]) for your transplant cares.  The following information is a summary of our appointment as well as important reminders.      Please make sure your phone is available today because I will call to update you with your anti-rejection drug levels and possibly make changes to your anti-rejection dosages.    Additional information:   1. Increase your Norvasc from 5 mg to 10 mg daily. May  at pharmacy on 1st floor upon leaving today.   2. HOLD your Lasix 40 mg 2x/day for now; do not resume until notified by MD.   3. Try Zofran 4 mg tablet as needed for bouts of nausea or before meals to increase nutritional intake. May  at pharmacy on 1st floor upon leaving today.   4. STOP dialysis for now, until notified by Nephrology of change in plan.   5. Return to [facility] tomorrow at 7 am for labs and assessment.   6. Try to take your Prograf closer to 7-7:30pm this evening, in preparation for lab tomorrow.   7. Keep up the good work your fluid intake!!  8. Wishing you luck on increasing your food intake!      We look forward in seeing you on your next appointment here at [facility].  Please don t hesitate to call us at [phone] to reschedule any of your appointments or to speak with one of the [facility] registered nurses.  It was a pleasure taking care of you today.    Sincerely,  Mellissa Antunez RN  [facility]  Special[facility]fusion [facility]  [address] [place] [address]  [address]  Phone:  [phone]

## 2017-05-01 NOTE — PROGRESS NOTES
Assessment and Plan:  1. DDKT - Patient has DGF requiring dialysis.  Will plan for 2.5 hours today with minimal UF in anticipation for improving renal function.  No change in immunosuppression.  Drain in place.  2. Hypertension - secondary to volume overload.  Continue lasix 80 mg bid and amlodipine..    3. Volume overload.  On lasix and requiring UF on dialysis.  EDW is 52.5 kg.  Will try to maintain weight at 54.5 kg for now.  4. Anemia - stable hemoglobin.  Patient is iron replete.  5. Renal Secondary Hyperparathyroidism - mildly elevated PTH at 149.  Patient has mild vitamin d insufficiency.  Recheck PTH at 3 months.  6. Vitamin D insufficiency - at 29.  Monitor for now.  7. Prophylaxis - EBV D+R+, CMV D+R+.  Valcyte for 3 months.    Assessment and plan was discussed with patient and she voiced her understanding and agreement.    Reason for Visit:  Ms. Willingham is here for hospital follow up following kidney transplant.    HPI:   Rosibel Willingham is a 41 year old female with ESKD from unknown etiology and is status post DDKT on 4/23/17.         Transplant Hx:       Tx: DDKT  Date: 4/23/17       Present Maintenance IS: Tacrolimus and Mycophenolate mofetil       Baseline Creatinine: TBD       Recent DSA: No  PRA ():     Class I:      Class II:        Biopsy: No    Patient has complication of delayed graft function due to prolonged ischemic times.  She remains on dialysis, last run two days prior.  She is scheduled to run today at [facility].  She notes minimal pain, good appetite, no nausea or vomiting.  She has substantial urine output this AM.  She remains on lasix 80 mg bid and notes edema and hypertension.  She was on dialysis prior to transplant via left arm AVF.  Her edw prior to transplant was 52.5 kg.  She has a drain in place with 80 ml of fluid this AM. She is traveling from [place] and will want to establish care there in the future.     Home BP: 150/90.      ROS:   A comprehensive review of systems was  obtained and negative, except as noted in the HPI or PMH.    Active Medical Problems:  Patient Active Problem List   Diagnosis     End stage kidney disease (H)     Anemia of chronic kidney failure     Secondary hyperparathyroidism (H)     Hypertension     Kidney transplant candidate     -donor kidney transplant     Immunosuppression (H)       Personal Hx:  Social History     Social History     Marital status:      Spouse name: N/A     Number of children: N/A     Years of education: N/A     Occupational History     Not on file.     Social History Main Topics     Smoking status: Former Smoker     Packs/day: 0.50     Years: 2.00     Types: Cigarettes     Quit date: 1998     Smokeless tobacco: Never Used     Alcohol use 1.2 oz/week     2 Standard drinks or equivalent per week     Drug use: No     Sexual activity: Not on file     Other Topics Concern     Not on file     Social History Narrative       Allergies:  Allergies   Allergen Reactions     Erythromycin Hives       Medications:  Prior to Admission medications    Medication Sig Start Date End Date Taking? Authorizing Provider   tacrolimus (PROGRAF - GENERIC EQUIVALENT) 0.5 MG capsule Take 1 capsule (0.5 mg) by mouth 2 times daily 17   Benjamin Beltran MD   mycophenolate (CELLCEPT - GENERIC EQUIVALENT) 250 MG capsule Take 3 capsules (750 mg) by mouth 2 times daily 17   Tawny Macias PA-C   tacrolimus (PROGRAF - GENERIC EQUIVALENT) 1 MG capsule Take 2 capsules (2 mg) by mouth 2 times daily 17   Gilberto, Tawny Alexandre PA-C   amLODIPine (NORVASC) 5 MG tablet Take 1 tablet (5 mg) by mouth daily 17   Gilberto, Tawny Alexandre PA-C   sulfamethoxazole-trimethoprim (BACTRIM/SEPTRA) 400-80 MG per tablet Take 1 tablet by mouth Every Mon, Wed, Fri Morning 17   FastTawny PA-C   valGANciclovir (VALCYTE) 450 MG tablet Take 1 tablet (450 mg) by mouth twice a week 17  Tawny Macias PA-C   aspirin EC  325 MG EC tablet Take 1 tablet (325 mg) by mouth daily 4/27/17 4/27/18  Tawny Macias PA-C   acetaminophen (TYLENOL) 325 MG tablet Take 2 tablets (650 mg) by mouth every 4 hours as needed for mild pain or fever 4/27/17   Tawny Macias PA-C   furosemide (LASIX) 40 MG tablet Take 2 tablets (80 mg) by mouth 2 times daily for 14 days 4/27/17 5/11/17  Gilberto, Tawny Alexandre PA-C   senna-docusate (SENOKOT-S;PERICOLACE) 8.6-50 MG per tablet Take 1-2 tablets by mouth daily as needed for constipation 4/27/17   Tawny Macias PA-C       Vitals:  There were no vitals taken for this visit.    Exam:   GENERAL APPEARANCE: alert and no distress  HENT: mouth without ulcers or lesions  LYMPHATICS: no cervical or supraclavicular nodes  RESP: lungs clear to auscultation - no rales, rhonchi or wheezes  CV: regular rhythm, normal rate, no rub, no murmur  EDEMA: 1+ LE edema bilaterally  ABDOMEN: soft, nondistended, nontender, bowel sounds normal  MS: extremities normal - no gross deformities noted, no evidence of inflammation in joints, no muscle tenderness  SKIN: no rash  TX KIDNEY: normal    Results:   Reviewed  Patient was seen and evaluated by me, Benjamin Beltran MD. I have reviewed the note and agree with the the plan of care as documented by the fellow.

## 2017-05-01 NOTE — PROGRESS NOTES
Assessment and Plan:  1. DDKT - Patient has DGF requiring dialysis.  On MWF dialysis as needed.  Her renal function appears to have reached a plateau, will hold off dialysis today.  Drain in place.  Urine creatinine 7.9.  There is a fall in prograf dose from 7.3 to 6.8.  She is on 2.5 mg bid.  This should be increased to 3mg bid.  2. Hypertension - patient is hypovolemic on exam.  Stop furosemide.  Increase amlodipine to 10 mg daily.    3. Volume overload.  Resolved.  Patient hypovolemic now.  EDW at 52.5 kg and currently 50.5 kg.  Will give 2 liters of normal saline today.  4. Anemia - stable hemoglobin.  Patient is iron replete.   5. Renal Secondary Hyperparathyroidism - mildly elevated PTH at 149.  Patient has mild vitamin d insufficiency.  Recheck PTH at 3 months.  6. Vitamin D insufficiency - at 29.  Monitor for now.  7. Prophylaxis - EBV D+R+, CMV D+R+.  Valcyte for 3 months.  8. Nausea - start zofran for nausea    Assessment and plan was discussed with patient and she voiced her understanding and agreement.    Reason for Visit:  Ms. Willingham is here for hospital follow up following kidney transplant.    HPI:   Rosibel Willingham is a 41 year old female with ESKD from unknown etiology and is status post DDKT on 4/23/17.         Transplant Hx:       Tx: DDKT  Date: 4/23/17       Present Maintenance IS: Tacrolimus and Mycophenolate mofetil       Baseline Creatinine: TBD       Recent DSA: No  PRA ():     Class I:      Class II:        Biopsy: No    Labs only yesterday with creatinine of 7.  She was at 7.45 on Friday and had a run of HD afterwards.  Prograf level at 4 on Friday with 2 mg bid dosing.  This was increased 2.5 on Friday and yesterdays level was improving at 7.6.  She called yesterday and noted a lot of urine output.  The lasix was decreased to 40 mg po bid.  She has a lot of nausea currently.  She is keeping up with hydration adequately.  No constipation or diarrhea.    Home BP: 150/90.      ROS:   A  comprehensive review of systems was obtained and negative, except as noted in the HPI or PMH.    Active Medical Problems:  Patient Active Problem List   Diagnosis     End stage kidney disease (H)     Anemia of chronic kidney failure     Secondary hyperparathyroidism (H)     Hypertension     Kidney transplant candidate     -donor kidney transplant     Immunosuppression (H)       Personal Hx:  Social History     Social History     Marital status:      Spouse name: N/A     Number of children: N/A     Years of education: N/A     Occupational History     Not on file.     Social History Main Topics     Smoking status: Former Smoker     Packs/day: 0.50     Years: 2.00     Types: Cigarettes     Quit date: 1998     Smokeless tobacco: Never Used     Alcohol use 1.2 oz/week     2 Standard drinks or equivalent per week     Drug use: No     Sexual activity: Not on file     Other Topics Concern     Not on file     Social History Narrative       Allergies:  Allergies   Allergen Reactions     Erythromycin Hives       Medications:  Prior to Admission medications    Medication Sig Start Date End Date Taking? Authorizing Provider   tacrolimus (PROGRAF - GENERIC EQUIVALENT) 0.5 MG capsule Take 1 capsule (0.5 mg) by mouth 2 times daily 17   Benjamin Beltran MD   mycophenolate (CELLCEPT - GENERIC EQUIVALENT) 250 MG capsule Take 3 capsules (750 mg) by mouth 2 times daily 17   Fast, Tawny Alexandre PA-C   tacrolimus (PROGRAF - GENERIC EQUIVALENT) 1 MG capsule Take 2 capsules (2 mg) by mouth 2 times daily 17   Fast, Tawny Alexandre PA-C   amLODIPine (NORVASC) 5 MG tablet Take 1 tablet (5 mg) by mouth daily 17   Fast, Tawny Alexandre PA-C   sulfamethoxazole-trimethoprim (BACTRIM/SEPTRA) 400-80 MG per tablet Take 1 tablet by mouth Every Mon, Wed, Fri Morning 17   Fast, Tawny Alexandre PA-C   valGANciclovir (VALCYTE) 450 MG tablet Take 1 tablet (450 mg) by mouth twice a week 17  Fast,  Tawny Alexandre PA-C   aspirin  MG EC tablet Take 1 tablet (325 mg) by mouth daily 4/27/17 4/27/18  Fast, Tawny Alexandre PA-C   acetaminophen (TYLENOL) 325 MG tablet Take 2 tablets (650 mg) by mouth every 4 hours as needed for mild pain or fever 4/27/17   Fast, Tawny Alexandre PA-C   senna-docusate (SENOKOT-S;PERICOLACE) 8.6-50 MG per tablet Take 1-2 tablets by mouth daily as needed for constipation 4/27/17   Fast, Tawny Alexandre PA-C       Vitals:  There were no vitals taken for this visit.  + orthostatic    Exam:   GENERAL APPEARANCE: alert and no distress  HENT: mouth without ulcers or lesions  LYMPHATICS: no cervical or supraclavicular nodes  RESP: lungs clear to auscultation - no rales, rhonchi or wheezes  CV: regular rhythm, normal rate, no rub, no murmur  EDEMA: No LE edema bilaterally  ABDOMEN: soft, nondistended, nontender, bowel sounds normal  MS: extremities normal - no gross deformities noted, no evidence of inflammation in joints, no muscle tenderness  SKIN: no rash  TX KIDNEY: normal    Results:   Reviewed    Patient was seen and evaluated by me, Benjamin Beltran MD. I have reviewed the note and agree with the the plan of care as documented by the fellow.

## 2017-05-01 NOTE — MR AVS SNAPSHOT
After Visit Summary   5/1/2017    Rosibel Willingham    MRN: [id number]           Patient Information     Date Of Birth          1975        Visit Information        Provider Department      5/1/2017 8:00 AM Benjamin Beltran MD [facility] Specialty and Procedure        Today's Diagnoses     Kidney replaced by transplant    -  1    End stage renal disease (H)        Immunosuppression (H)        Aftercare following organ transplant           Follow-ups after your visit        Your next 10 appointments already scheduled     May 15, 2017  2:00 PM CDT   (Arrive by 1:45 PM)   Kidney Post Op with Leanne Montelongo MD   [facility] Solid Organ Transplant ([facility])    [address] [id number] [phone]            May 22, 2017   Procedure with Leanne Montelongo MD   [facility] [place], Same Day Surgery (--)    [address] [facility] [address] [phone]            May 22, 2017  1:00 PM CDT   (Arrive by 12:30 PM)   Return Kidney Transplant with Benjamin Beltran MD   [facility] Nephrology ([facility])    [address] [place] [id number] [phone]            Jul 24, 2017  1:10 PM CDT   (Arrive by 12:40 PM)   Return Kidney Transplant with Benjamin Beltran MD   [facility] Nephrology ([facility])    [address] [place] [id number] [phone]              Who to contact     If you have questions or need follow up information about today's clinic visit or your schedule please contact [facility] SPECIALTY AND PROCEDURE directly at [phone].  Normal or non-critical lab and imaging results will be communicated to you by MyChart, letter or phone within 4 business days after the clinic has received the results. If you do not hear from us within 7 days, please contact the clinic through MyChart or phone.  "If you have a critical or abnormal lab result, we will notify you by phone as soon as possible.  Submit refill requests through [employer] or call your pharmacy and they will forward the refill request to us. Please allow 3 business days for your refill to be completed.          Additional Information About Your Visit        [employer]art Information     [employer] lets you send messages to your doctor, view your test results, renew your prescriptions, schedule appointments and more. To sign up, go to [email or web address] . Click on \"Log in\" on the left side of the screen, which will take you to the Welcome page. Then click on \"Sign up Now\" on the right side of the page.     You will be asked to enter the access code listed below, as well as some personal information. Please follow the directions to create your username and password.     Your access code is: [email or web address]  Expires: 2017  2:16 PM     Your access code will  in 90 days. If you need help or a new code, please call your [place] clinic or [phone].        Care EveryWhere ID     This is your Care EveryWhere ID. This could be used by other organizations to access your [place] medical records  [id number]         Blood Pressure from Last 3 Encounters:   17 113/73   17 128/75   17 (P) 145/83    Weight from Last 3 Encounters:   17 48.7 kg (107 lb 4.8 oz)   17 49.1 kg (108 lb 3.2 oz)   17 50.2 kg (110 lb 9.6 oz)              Today, you had the following     No orders found for display         Today's Medication Changes          These changes are accurate as of: 17 11:59 PM.  If you have any questions, ask your nurse or doctor.               Start taking these medicines.        Dose/Directions    ondansetron 4 MG tablet   Commonly known as:  ZOFRAN   Used for:  Kidney replaced by transplant   Started by:  Benjamin Beltran MD        Dose:  4 mg   Take 1 tablet (4 mg) by mouth every 8 hours as needed for nausea "   Quantity:  30 tablet   Refills:  0         These medicines have changed or have updated prescriptions.        Dose/Directions    amLODIPine 5 MG tablet   Commonly known as:  NORVASC   This may have changed:  how much to take   Used for:  End stage renal disease (H)   Changed by:  Benjamin Beltran MD        Dose:  10 mg   Take 2 tablets (10 mg) by mouth daily   Quantity:  30 tablet   Refills:  3         Stop taking these medicines if you haven't already. Please contact your care team if you have questions.     furosemide 40 MG tablet   Commonly known as:  LASIX   Stopped by:  Benjamin Beltran MD                Where to get your medicines      These medications were sent to [facility] [address] [facility] [address]  [address] [place] [address] [address] [place] [address]    Hours:  TRANSPLANT PHONE NUMBER [phone] Phone:  [phone]     amLODIPine 5 MG tablet    ondansetron 4 MG tablet                Primary Care Provider    Physician No Ref-Primary       No address on file        Thank you!     Thank you for choosing [facility] SPECIALTY AND PROCEDURE  for your care. Our goal is always to provide you with excellent care. Hearing back from our patients is one way we can continue to improve our services. Please take a few minutes to complete the written survey that you may receive in the mail after your visit with us. Thank you!             Your Updated Medication List - Protect others around you: Learn how to safely use, store and throw away your medicines at www.disposemymeds.org.          This list is accurate as of: 5/1/17 11:59 PM.  Always use your most recent med list.                   Brand Name Dispense Instructions for use    acetaminophen 325 MG tablet    TYLENOL    100 tablet    Take 2 tablets (650 mg) by mouth every 4 hours as needed for mild pain or fever       amLODIPine 5 MG tablet    NORVASC    30 tablet    Take 2 tablets (10 mg) by  mouth daily       aspirin 325 MG EC tablet     30 tablet    Take 1 tablet (325 mg) by mouth daily       mycophenolate 250 MG capsule    CELLCEPT - GENERIC EQUIVALENT    180 capsule    Take 3 capsules (750 mg) by mouth 2 times daily       ondansetron 4 MG tablet    ZOFRAN    30 tablet    Take 1 tablet (4 mg) by mouth every 8 hours as needed for nausea       senna-docusate 8.6-50 MG per tablet    SENOKOT-S;PERICOLACE    100 tablet    Take 1-2 tablets by mouth daily as needed for constipation       sulfamethoxazole-trimethoprim 400-80 MG per tablet    BACTRIM/SEPTRA    12 tablet    Take 1 tablet by mouth Every Mon, Wed, Fri Morning       tacrolimus 0.5 MG capsule    PROGRAF - GENERIC EQUIVALENT    60 capsule    Take 1 capsule (0.5 mg) by mouth 2 times daily       valGANciclovir 450 MG tablet    VALCYTE    8 tablet    Take 1 tablet (450 mg) by mouth twice a week

## 2017-05-01 NOTE — MR AVS SNAPSHOT
After Visit Summary   5/1/2017    Rosibel Willingham    MRN: [id number]           Patient Information     Date Of Birth          1975        Visit Information        Provider Department      5/1/2017 7:00 AM  43 ATC;  SPEC [facility] [place] Specialty and Procedure        Today's Diagnoses     Status post kidney transplant    -  1      Care Instructions    Dear Rosibel Willingham    Thank you for choosing [facility] Specialty Infusion and Procedure Center ([facility]) for your transplant cares.  The following information is a summary of our appointment as well as important reminders.      Please make sure your phone is available today because I will call to update you with your anti-rejection drug levels and possibly make changes to your anti-rejection dosages.    Additional information:   1. Increase your Norvasc from 5 mg to 10 mg daily. May  at pharmacy on 1st floor upon leaving today.   2. HOLD your Lasix 40 mg 2x/day for now; do not resume until notified by MD.   3. Try Zofran 4 mg tablet as needed for bouts of nausea or before meals to increase nutritional intake. May  at pharmacy on 1st floor upon leaving today.   4. STOP dialysis for now, until notified by Nephrology of change in plan.   5. Return to [facility] tomorrow at 7 am for labs and assessment.   6. Try to take your Prograf closer to 7-7:30pm this evening, in preparation for lab tomorrow.   7. Keep up the good work your fluid intake!!  8. Wishing you luck on increasing your food intake!      We look forward in seeing you on your next appointment here at [facility].  Please don t hesitate to call us at [phone] to reschedule any of your appointments or to speak with one of the [facility] registered nurses.  It was a pleasure taking care of you today.    Sincerely,  Mellissa Antunez, NICHOLAS  [facility]  Specialty Infusion & Procedure Center  [address] [place] [address]  [place]  [address]  Phone:  [phone]          Follow-ups after your visit        Your next 10 appointments already scheduled     May 15, 2017  2:00 PM CDT   (Arrive by 1:45 PM)   Kidney Post Op with Leanne Montelongo MD   [facility] Solid Organ Transplant ([facility])    [address] [place] [address] [phone]            May 22, 2017   Procedure with Leanne Montelongo MD   [facility] [place], Same Day Surgery (--)    [address] [facility] [address] [phone]            May 22, 2017  1:00 PM CDT   (Arrive by 12:30 PM)   Return Kidney Transplant with Benjamin Beltran MD   [facility] Nephrology ([facility])    [address] [place] [address] [phone]            Jul 24, 2017  1:10 PM CDT   (Arrive by 12:40 PM)   Return Kidney Transplant with Benjamin Beltran MD   [facility] Nephrology ([facility])    [address] [place] [address] [phone]              Who to contact     If you have questions or need follow up information about today's clinic visit or your schedule please contact [facility] SPECIALTY AND PROCEDURE directly at [phone].  Normal or non-critical lab and imaging results will be communicated to you by [employer], letter or phone within 4 business days after the clinic has received the results. If you do not hear from us within 7 days, please contact the clinic through [employer] or phone. If you have a critical or abnormal lab result, we will notify you by phone as soon as possible.  Submit refill requests through [employer] or call your pharmacy and they will forward the refill request to us. Please allow 3 business days for your refill to be completed.          Additional Information About Your Visit        [employer] Information     [employer] lets you send messages to your doctor, view your test results,  "renew your prescriptions, schedule appointments and more. To sign up, go to [email or web address] . Click on \"Log in\" on the left side of the screen, which will take you to the Welcome page. Then click on \"Sign up Now\" on the right side of the page.     You will be asked to enter the access code listed below, as well as some personal information. Please follow the directions to create your username and password.     Your access code is: [email or web address]  Expires: 2017  2:16 PM     Your access code will  in 90 days. If you need help or a new code, please call your [place] clinic or [phone].        Care EveryWhere ID     This is your Care EveryWhere ID. This could be used by other organizations to access your [place] medical records  [id number]        Your Vitals Were     Temperature Respirations Pulse Oximetry BMI (Body Mass Index)          97.8  F (36.6  C) (Oral) 18 99% 20.38 kg/m2         Blood Pressure from Last 3 Encounters:   17 162/90   17 (!) 160/97   16 (!) 128/91    Weight from Last 3 Encounters:   17 50.5 kg (111 lb 6.4 oz)   17 56.9 kg (125 lb 8 oz)   17 58 kg (127 lb 14.4 oz)              We Performed the Following     Basic metabolic panel     CBC with platelets differential     Magnesium     Phosphorus     Tacrolimus level          Today's Medication Changes          These changes are accurate as of: 17 11:54 AM.  If you have any questions, ask your nurse or doctor.               Start taking these medicines.        Dose/Directions    ondansetron 4 MG tablet   Commonly known as:  ZOFRAN   Used for:  Kidney replaced by transplant   Started by:  Benjamin Beltran MD        Dose:  4 mg   Take 1 tablet (4 mg) by mouth every 8 hours as needed for nausea   Quantity:  30 tablet   Refills:  0         These medicines have changed or have updated prescriptions.        Dose/Directions    amLODIPine 5 MG tablet   Commonly known as:  NORVASC   This may have " changed:  how much to take   Used for:  End stage renal disease (H)   Changed by:  Benjamin Beltran MD        Dose:  10 mg   Take 2 tablets (10 mg) by mouth daily   Quantity:  30 tablet   Refills:  3         Stop taking these medicines if you haven't already. Please contact your care team if you have questions.     furosemide 40 MG tablet   Commonly known as:  LASIX   Stopped by:  Benjamin Beltran MD                Where to get your medicines      These medications were sent to [facility] [address] [facility] [address]  [address] [facility] [address] [address]    Hours:  TRANSPLANT PHONE NUMBER [phone] Phone:  [phone]     amLODIPine 5 MG tablet    ondansetron 4 MG tablet                Primary Care Provider    Physician No Ref-Primary       No address on file        Thank you!     Thank you for choosing [facility] SPECIALTY AND PROCEDURE  for your care. Our goal is always to provide you with excellent care. Hearing back from our patients is one way we can continue to improve our services. Please take a few minutes to complete the written survey that you may receive in the mail after your visit with us. Thank you!             Your Updated Medication List - Protect others around you: Learn how to safely use, store and throw away your medicines at www.disposemymeds.org.          This list is accurate as of: 5/1/17 11:54 AM.  Always use your most recent med list.                   Brand Name Dispense Instructions for use    acetaminophen 325 MG tablet    TYLENOL    100 tablet    Take 2 tablets (650 mg) by mouth every 4 hours as needed for mild pain or fever       amLODIPine 5 MG tablet    NORVASC    30 tablet    Take 2 tablets (10 mg) by mouth daily       aspirin 325 MG EC tablet     30 tablet    Take 1 tablet (325 mg) by mouth daily       mycophenolate 250 MG capsule    CELLCEPT - GENERIC EQUIVALENT    180 capsule    Take 3 capsules  (750 mg) by mouth 2 times daily       ondansetron 4 MG tablet    ZOFRAN    30 tablet    Take 1 tablet (4 mg) by mouth every 8 hours as needed for nausea       senna-docusate 8.6-50 MG per tablet    SENOKOT-S;PERICOLACE    100 tablet    Take 1-2 tablets by mouth daily as needed for constipation       sulfamethoxazole-trimethoprim 400-80 MG per tablet    BACTRIM/SEPTRA    12 tablet    Take 1 tablet by mouth Every Mon, Wed, Fri Morning       * tacrolimus 1 MG capsule    PROGRAF - GENERIC EQUIVALENT    120 capsule    Take 2 capsules (2 mg) by mouth 2 times daily       * tacrolimus 0.5 MG capsule    PROGRAF - GENERIC EQUIVALENT    60 capsule    Take 1 capsule (0.5 mg) by mouth 2 times daily       valGANciclovir 450 MG tablet    VALCYTE    8 tablet    Take 1 tablet (450 mg) by mouth twice a week       * Notice:  This list has 2 medication(s) that are the same as other medications prescribed for you. Read the directions carefully, and ask your doctor or other care provider to review them with you.

## 2017-05-02 ENCOUNTER — INFUSION THERAPY VISIT (OUTPATIENT)
Dept: INFUSION THERAPY | Facility: CLINIC | Age: 42
End: 2017-05-02
Attending: INTERNAL MEDICINE
Payer: COMMERCIAL

## 2017-05-02 VITALS — OXYGEN SATURATION: 100 % | BODY MASS INDEX: 20.39 KG/M2 | TEMPERATURE: 98.2 F | WEIGHT: 111.5 LBS

## 2017-05-02 DIAGNOSIS — Z94.0 KIDNEY REPLACED BY TRANSPLANT: ICD-10-CM

## 2017-05-02 DIAGNOSIS — E83.42 HYPOMAGNESEMIA: Primary | ICD-10-CM

## 2017-05-02 DIAGNOSIS — Z48.298 AFTERCARE FOLLOWING ORGAN TRANSPLANT: ICD-10-CM

## 2017-05-02 DIAGNOSIS — E55.9 VITAMIN D DEFICIENCY: ICD-10-CM

## 2017-05-02 DIAGNOSIS — I95.1 ORTHOSTATIC HYPOTENSION: ICD-10-CM

## 2017-05-02 DIAGNOSIS — E87.6 HYPOKALEMIA: ICD-10-CM

## 2017-05-02 DIAGNOSIS — Z94.0 STATUS POST KIDNEY TRANSPLANT: Primary | ICD-10-CM

## 2017-05-02 DIAGNOSIS — Z94.0 KIDNEY REPLACED BY TRANSPLANT: Primary | ICD-10-CM

## 2017-05-02 LAB
ANION GAP SERPL CALCULATED.3IONS-SCNC: 13 MMOL/L (ref 3–14)
BASOPHILS # BLD AUTO: 0 10E9/L (ref 0–0.2)
BASOPHILS NFR BLD AUTO: 0.2 %
BUN SERPL-MCNC: 62 MG/DL (ref 7–30)
CALCIUM SERPL-MCNC: 7.6 MG/DL (ref 8.5–10.1)
CHLORIDE SERPL-SCNC: 112 MMOL/L (ref 94–109)
CO2 SERPL-SCNC: 18 MMOL/L (ref 20–32)
CREAT SERPL-MCNC: 5.83 MG/DL (ref 0.52–1.04)
DIFFERENTIAL METHOD BLD: ABNORMAL
EOSINOPHIL # BLD AUTO: 0.2 10E9/L (ref 0–0.7)
EOSINOPHIL NFR BLD AUTO: 3.6 %
ERYTHROCYTE [DISTWIDTH] IN BLOOD BY AUTOMATED COUNT: 15.4 % (ref 10–15)
GFR SERPL CREATININE-BSD FRML MDRD: 8 ML/MIN/1.7M2
GLUCOSE SERPL-MCNC: 90 MG/DL (ref 70–99)
HCT VFR BLD AUTO: 23.5 % (ref 35–47)
HGB BLD-MCNC: 7.9 G/DL (ref 11.7–15.7)
IMM GRANULOCYTES # BLD: 0 10E9/L (ref 0–0.4)
IMM GRANULOCYTES NFR BLD: 0.7 %
LYMPHOCYTES # BLD AUTO: 0.3 10E9/L (ref 0.8–5.3)
LYMPHOCYTES NFR BLD AUTO: 4.4 %
MAGNESIUM SERPL-MCNC: 1.4 MG/DL (ref 1.6–2.3)
MCH RBC QN AUTO: 29.3 PG (ref 26.5–33)
MCHC RBC AUTO-ENTMCNC: 33.6 G/DL (ref 31.5–36.5)
MCV RBC AUTO: 87 FL (ref 78–100)
MONOCYTES # BLD AUTO: 0.4 10E9/L (ref 0–1.3)
MONOCYTES NFR BLD AUTO: 6.5 %
NEUTROPHILS # BLD AUTO: 5 10E9/L (ref 1.6–8.3)
NEUTROPHILS NFR BLD AUTO: 84.6 %
NRBC # BLD AUTO: 0 10*3/UL
NRBC BLD AUTO-RTO: 0 /100
PHOSPHATE SERPL-MCNC: 5.2 MG/DL (ref 2.5–4.5)
PLATELET # BLD AUTO: 271 10E9/L (ref 150–450)
POTASSIUM SERPL-SCNC: 3.3 MMOL/L (ref 3.4–5.3)
RBC # BLD AUTO: 2.7 10E12/L (ref 3.8–5.2)
SODIUM SERPL-SCNC: 143 MMOL/L (ref 133–144)
TACROLIMUS BLD-MCNC: 5.7 UG/L (ref 5–15)
TME LAST DOSE: NORMAL H
WBC # BLD AUTO: 5.9 10E9/L (ref 4–11)

## 2017-05-02 PROCEDURE — 25000128 H RX IP 250 OP 636: Mod: ZF | Performed by: INTERNAL MEDICINE

## 2017-05-02 PROCEDURE — 83735 ASSAY OF MAGNESIUM: CPT | Performed by: INTERNAL MEDICINE

## 2017-05-02 PROCEDURE — 85025 COMPLETE CBC W/AUTO DIFF WBC: CPT | Performed by: INTERNAL MEDICINE

## 2017-05-02 PROCEDURE — 84100 ASSAY OF PHOSPHORUS: CPT | Performed by: INTERNAL MEDICINE

## 2017-05-02 PROCEDURE — 99215 OFFICE O/P EST HI 40 MIN: CPT | Mod: 25

## 2017-05-02 PROCEDURE — 80197 ASSAY OF TACROLIMUS: CPT | Performed by: INTERNAL MEDICINE

## 2017-05-02 PROCEDURE — 96360 HYDRATION IV INFUSION INIT: CPT

## 2017-05-02 PROCEDURE — 25000132 ZZH RX MED GY IP 250 OP 250 PS 637: Mod: ZF | Performed by: INTERNAL MEDICINE

## 2017-05-02 PROCEDURE — 80048 BASIC METABOLIC PNL TOTAL CA: CPT | Performed by: INTERNAL MEDICINE

## 2017-05-02 RX ORDER — MAGNESIUM OXIDE 400 MG/1
400 TABLET ORAL 2 TIMES DAILY
Qty: 60 TABLET | Refills: 3 | Status: SHIPPED | OUTPATIENT
Start: 2017-05-02 | End: 2017-05-02

## 2017-05-02 RX ORDER — MAGNESIUM OXIDE 400 MG/1
400 TABLET ORAL 2 TIMES DAILY
Qty: 120 TABLET | Refills: 3 | Status: SHIPPED | OUTPATIENT
Start: 2017-05-02 | End: 2017-05-22

## 2017-05-02 RX ORDER — POTASSIUM CHLORIDE 1500 MG/1
40 TABLET, EXTENDED RELEASE ORAL ONCE
Status: COMPLETED | OUTPATIENT
Start: 2017-05-02 | End: 2017-05-02

## 2017-05-02 RX ADMIN — SODIUM CHLORIDE 1000 ML: 9 INJECTION, SOLUTION INTRAVENOUS at 09:17

## 2017-05-02 RX ADMIN — POTASSIUM CHLORIDE 40 MEQ: 20 TABLET, EXTENDED RELEASE ORAL at 09:42

## 2017-05-02 NOTE — MR AVS SNAPSHOT
After Visit Summary   5/2/2017    Rosibel Willingham    MRN: [id number]           Patient Information     Date Of Birth          1975        Visit Information        Provider Department      5/2/2017 8:00 AM Benjamin Beltran MD [facility] Specialty and Procedure        Today's Diagnoses     Hypomagnesemia    -  1    Hypokalemia        Orthostatic hypotension        Vitamin D deficiency        Aftercare following organ transplant        Kidney replaced by transplant           Follow-ups after your visit        Your next 10 appointments already scheduled     May 15, 2017  2:00 PM CDT   (Arrive by 1:45 PM)   Kidney Post Op with Leanne Montelongo MD   [facility] Solid Organ Transplant ([facility])    [address] [id number] [phone]            May 22, 2017   Procedure with Leanne Montelongo MD   [facility] [place], Same Day Surgery (--)    [address] [id number] [phone]            May 22, 2017  1:00 PM CDT   (Arrive by 12:30 PM)   Return Kidney Transplant with Benjamin Beltran MD   [facility] Nephrology ([facility])    [address] [place] [id number] [phone]            Jul 24, 2017  1:10 PM CDT   (Arrive by 12:40 PM)   Return Kidney Transplant with Benjamin Beltran MD   [facility] Nephrology ([facility])    [address] [place] [id number] [phone]              Who to contact     If you have questions or need follow up information about today's clinic visit or your schedule please contact [facility] SPECIALTY AND PROCEDURE directly at [phone].  Normal or non-critical lab and imaging results will be communicated to you by MyChart, letter or phone within 4 business days after the clinic has received the results. If you do not hear from us within 7 days, please contact  "the clinic through [employer]t or phone. If you have a critical or abnormal lab result, we will notify you by phone as soon as possible.  Submit refill requests through [employer] or call your pharmacy and they will forward the refill request to us. Please allow 3 business days for your refill to be completed.          Additional Information About Your Visit        [employer] o[employer] Information     [employer] lets you send messages to your doctor, view your test results, renew your prescriptions, schedule appointments and more. To sign up, go to [email or web address] . Click on \"Log in\" on the left side of the screen, which will take you to the Welcome page. Then click on \"Sign up Now\" on the right side of the page.     You will be asked to enter the access code listed below, as well as some personal information. Please follow the directions to create your username and password.     Your access code is: [email or web address]  Expires: 2017  2:16 PM     Your access code will  in 90 days. If you need help or a new code, please call your [facility] clinic or [phone].        Care EveryWhere ID     This is your Care EveryWhere ID. This could be used by other organizations to access your [facility] medical records  [id number]         Blood Pressure from Last 3 Encounters:   17 113/73   17 128/75   17 (P) 145/83    Weight from Last 3 Encounters:   17 48.7 kg (107 lb 4.8 oz)   17 49.1 kg (108 lb 3.2 oz)   17 50.2 kg (110 lb 9.6 oz)              Today, you had the following     No orders found for display         Today's Medication Changes          These changes are accurate as of: 17 11:59 PM.  If you have any questions, ask your nurse or doctor.               Start taking these medicines.        Dose/Directions    cholecalciferol 1000 UNITS capsule   Commonly known as:  vitamin  -D   Used for:  Vitamin D deficiency   Started by:  Benjamin Beltran MD        Dose:  1 capsule   Take 1 capsule " (1,000 Units) by mouth daily   Quantity:  30 capsule   Refills:  11       magnesium oxide 400 MG tablet   Commonly known as:  MAG-OX   Used for:  Hypomagnesemia   Started by:  Benjamin Beltran MD        Dose:  400 mg   Take 1 tablet (400 mg) by mouth 2 times daily   Quantity:  120 tablet   Refills:  3            Where to get your medicines      These medications were sent to [place], [place] - [address]  [address], [facility] [address]    Hours:  TRANSPLANT PHONE NUMBER [phone] Phone:  [phone]     cholecalciferol 1000 UNITS capsule    magnesium oxide 400 MG tablet                Primary Care Provider    Physician No Ref-Primary       No address on file        Thank you!     Thank you for choosing [facility] SPECIALTY AND PROCEDURE  for your care. Our goal is always to provide you with excellent care. Hearing back from our patients is one way we can continue to improve our services. Please take a few minutes to complete the written survey that you may receive in the mail after your visit with us. Thank you!             Your Updated Medication List - Protect others around you: Learn how to safely use, store and throw away your medicines at www.disposemymeds.org.          This list is accurate as of: 5/2/17 11:59 PM.  Always use your most recent med list.                   Brand Name Dispense Instructions for use    acetaminophen 325 MG tablet    TYLENOL    100 tablet    Take 2 tablets (650 mg) by mouth every 4 hours as needed for mild pain or fever       amLODIPine 5 MG tablet    NORVASC    30 tablet    Take 2 tablets (10 mg) by mouth daily       aspirin 325 MG EC tablet     30 tablet    Take 1 tablet (325 mg) by mouth daily       cholecalciferol 1000 UNITS capsule    vitamin  -D    30 capsule    Take 1 capsule (1,000 Units) by mouth daily       magnesium oxide 400 MG tablet    MAG-OX    120 tablet    Take 1  tablet (400 mg) by mouth 2 times daily       mycophenolate 250 MG capsule    CELLCEPT - GENERIC EQUIVALENT    180 capsule    Take 3 capsules (750 mg) by mouth 2 times daily       ondansetron 4 MG tablet    ZOFRAN    30 tablet    Take 1 tablet (4 mg) by mouth every 8 hours as needed for nausea       senna-docusate 8.6-50 MG per tablet    SENOKOT-S;PERICOLACE    100 tablet    Take 1-2 tablets by mouth daily as needed for constipation       sulfamethoxazole-trimethoprim 400-80 MG per tablet    BACTRIM/SEPTRA    12 tablet    Take 1 tablet by mouth Every Mon, Wed, Fri Morning       tacrolimus 0.5 MG capsule    PROGRAF - GENERIC EQUIVALENT    60 capsule    Take 1 capsule (0.5 mg) by mouth 2 times daily       valGANciclovir 450 MG tablet    VALCYTE    8 tablet    Take 1 tablet (450 mg) by mouth twice a week

## 2017-05-02 NOTE — PATIENT INSTRUCTIONS
Dear Rosibel Willingham    Thank you for choosing [facility] ([facility]) for your transplant cares.  The following information is a summary of our appointment as well as important reminders.      Please make sure your phone is available today because I will call to update you with your anti-rejection drug levels and possibly make changes to your anti-rejection dosages.    Additional information:   1. Start taking Cholecalciferol (Vitamin D) 1,000 units daily. May  at pharmacy on 1st floor upon leaving today. Take your first dose upon arriving home today.  2. Start taking Magnesium Oxide 400 mg 2x/day.  May  at pharmacy on 1st floor upon leaving today. Take your first dose upon arriving home today; take second dose yet this evening.    3. Return to [facility] tomorrow at 7 am for labs and assessment.   4. Try to take your Prograf closer to 7-7:30pm this evening, in preparation for lab tomorrow.     We look forward in seeing you on your next appointment here at [facility].  Please don t hesitate to call us at [phone] to reschedule any of your appointments or to speak with one of the [facility] registered nurses.  It was a pleasure taking care of you today.    Sincerely,  Mellissa Antunez RN  [facility]  Specialty Infusion & Procedure Center  [address] [place] [address]  [address]  Phone:  [phone]

## 2017-05-02 NOTE — PROGRESS NOTES
"Rosibel Willingham came to [facility] today for a lab and assess following a DDKT transplant on 4/23/17.      Discharge date: 4/27/17  Transplant coordinator: Chinyere Burnham  Phone number patient can be reached at: Rosibel cell: [phone] alright to leave a message if unavailable to take call      Physical Assessment:  See physical assessment located under \"Document Flowsheets\".  Incision site: C/D/I and secured with Dermabond; insertion site of J/P drain clear of infection; area cleansed with Micro-klenz spray and new dressing replaced  Lines: J/P output 50 ml over 12 hours drained at HS; another 50 ml removed from drain at 0800 this AM; consistently 50 ml q 12 hours  Huang: n/a  Urine clarity: clear yellow urine; output > 3 L yesterday  Hydration: adequate fluid intake of > 2+ L daily  Nutrition: intake slowly improving with small meals; Zofran effective in controlling N  Last BM: 5/1/17 liquid output d/t diminished solid food intake  Pain: no presence of pain while resting; rates as \"4/10\" with activity/movement; pt reports pain well managed with PO Tylenol    Laboratory tests: Standard labs drawn.    Plan of care for today: lab and assessment, rounding by Nephrology, review of lab results, incisional and drain care, ongoing checklist education, IV infusion of 2 L NS, administration of K+ supplement    Medication changes: Addition of Cholecalciferol (Vitamin D) 1,000 units cap daily and Magnesium Oxide 400 mg tab 2x/day.     Medications administered: Pt took all scheduled AM medications following lab draw. Also received potassium chloride 40 meq PO and 1 L NS via IV today.         Pt stated had difficulty filling increased dose for Amlodipine from Outpatient Pharmacy yesterday. Upon discussion with [employer]fort from pharmacy this AM, override attempted and refills not covered d/t medications already being delivered through [employer] mail service. Marcella Gonzalez requested customer to call to resolve at [phone]. Information " provided to pt and verbalized understanding of action required. Pt also stated, Rx had already been filled and waiting at home pharmacy in [place]. Pt will contact that pharmacy to have medication returned for future release. Pt has ample supply on hand to fulfill new dose increase in the meantime.     Patient education:    The following teaching topics were addressed: Importance of drinking 2L of non-caffeinated fluids daily, Incisional care, Signs/symptoms of infection, Good handwashing, Medications (purposes, doses and times of administration), 6D discharge check list, Plan of care and Drain care   Patient and spouse verbalized understanding and all questions answered.    Drug level:  Prograf level today reviewed with Dr Cohen who gave orders to increase dose for tonight only to 3.5 mg, then resume 3 mg q 12 hours starting tomorrow.  Patient was updated with this information via voicemail message left on personal cell phone.    Face to face time: 20 minutes.  Discharge Plan    Pt will follow up with [facility] for lab and assessment at 0700 tomorrow.  Discharge instructions reviewed with patient: YES  Patient/Representative verbalized understanding, all questions answered: YES    Discharged from unit at 1035 with whom:  to [facility].    Mellissa Antunez RN     Administrations This Visit     0.9% sodium chloride BOLUS     Admin Date Action Dose Route Administered By             05/02/2017 New Bag 1000 mL Intravenous Mellissa Antunez RN                    potassium chloride SA (K-DUR/KLOR-CON M) CR tablet 40 mEq     Admin Date Action Dose Route Administered By             05/02/2017 Given 40 mEq Oral Mellissa Antunez RN

## 2017-05-02 NOTE — PROGRESS NOTES
Assessment and Plan:  1. DDKT - Patient has DGF requiring dialysis.  Drain in place.  Increase prograf to 3mg bid.  Patient has improving kidney function.  2. Hypertension - patient is hypovolemic on exam.  Continue amlodipine to 10 mg daily.    3. Volume overload.  Resolved.  Patient hypovolemic now.  EDW at 52.5 kg and currently 50.5 kg.  Will give 1 liters of normal saline today.  4. Anemia - stable hemoglobin. Monitor for aranesp needs (not on it previously)  Patient is iron replete.   5. Renal Secondary Hyperparathyroidism - mildly elevated PTH at 149.  Patient has mild vitamin d insufficiency.  Recheck PTH at 3 months.  6. Vitamin D insufficiency - at 29.  Start cholecalciferol 1000 units daily  7. Prophylaxis - EBV D+R+, CMV D+R+.  Valcyte for 3 months.  8. Nausea - continue zofran for nausea  9. Hypomagnesemia - start magnesium oxide 400 mg bid  10. Hypokalemia - potassium supplementation today and remove any potassium restriction    Follow up tomorrow    Assessment and plan was discussed with patient and she voiced her understanding and agreement.    Reason for Visit:  Ms. Willingham is here for hospital follow up following kidney transplant.    HPI:   Rosibel Willingham is a 41 year old female with ESKD from unknown etiology and is status post DDKT on 4/23/17.         Transplant Hx:       Tx: DDKT  Date: 4/23/17       Present Maintenance IS: Tacrolimus and Mycophenolate mofetil       Baseline Creatinine: TBD       Recent DSA: No  PRA ():     Class I:      Class II:        Biopsy: No    Nausea much improved with zofran.  Remains hypertensive.  Still with lots of urine output.  Denies lightheadedness.  Appetite slightly improved.  No edema.  No fevers, chills.  Patient is limiting potassium intake as recommended by the discharge instructions.  She increased amlodipine yesterday to 10 mg daily.  Received 2 liters of iv fluids. Asked to increase prograf to 3 but remains on 2.5 mg this am.    Home BP: 170/90.       ROS:   A comprehensive review of systems was obtained and negative, except as noted in the HPI or PMH.    Active Medical Problems:  Patient Active Problem List   Diagnosis     End stage kidney disease (H)     Anemia of chronic kidney failure     Secondary hyperparathyroidism (H)     Hypertension     Kidney transplant candidate     -donor kidney transplant     Immunosuppression (H)     Delayed graft function of kidney     Aftercare following organ transplant       Personal Hx:  Social History     Social History     Marital status:      Spouse name: N/A     Number of children: N/A     Years of education: N/A     Occupational History     Not on file.     Social History Main Topics     Smoking status: Former Smoker     Packs/day: 0.50     Years: 2.00     Types: Cigarettes     Quit date: 1998     Smokeless tobacco: Never Used     Alcohol use 1.2 oz/week     2 Standard drinks or equivalent per week     Drug use: No     Sexual activity: Not on file     Other Topics Concern     Not on file     Social History Narrative       Allergies:  Allergies   Allergen Reactions     Erythromycin Hives       Medications:  Prior to Admission medications    Medication Sig Start Date End Date Taking? Authorizing Provider   tacrolimus (PROGRAF - GENERIC EQUIVALENT) 0.5 MG capsule Take 1 capsule (0.5 mg) by mouth 2 times daily 17   Benjamin Beltran MD   mycophenolate (CELLCEPT - GENERIC EQUIVALENT) 250 MG capsule Take 3 capsules (750 mg) by mouth 2 times daily 17   Fast, Tawny Alexandre PA-C   tacrolimus (PROGRAF - GENERIC EQUIVALENT) 1 MG capsule Take 2 capsules (2 mg) by mouth 2 times daily 17   Fast, Tawny Alexandre PA-C   amLODIPine (NORVASC) 5 MG tablet Take 1 tablet (5 mg) by mouth daily 17   Fast, Tawny Alexandre PA-C   sulfamethoxazole-trimethoprim (BACTRIM/SEPTRA) 400-80 MG per tablet Take 1 tablet by mouth Every Mon, Wed, Fri Morning 17   Fast, Tawny Alexandre PA-C   valGANciclovir  (VALCYTE) 450 MG tablet Take 1 tablet (450 mg) by mouth twice a week 4/27/17 7/26/17  Fast, Tawny Alexandre PA-C   aspirin  MG EC tablet Take 1 tablet (325 mg) by mouth daily 4/27/17 4/27/18  Fast, Tawny Alexandre PA-C   acetaminophen (TYLENOL) 325 MG tablet Take 2 tablets (650 mg) by mouth every 4 hours as needed for mild pain or fever 4/27/17   Fast, Tawny Alexandre PA-C   senna-docusate (SENOKOT-S;PERICOLACE) 8.6-50 MG per tablet Take 1-2 tablets by mouth daily as needed for constipation 4/27/17   Fast, Tawny Alexandre PA-C       Vitals:  There were no vitals taken for this visit.  + orthostatic    Exam:   GENERAL APPEARANCE: alert and no distress  HENT: mouth without ulcers or lesions  LYMPHATICS: no cervical or supraclavicular nodes  RESP: lungs clear to auscultation - no rales, rhonchi or wheezes  CV: regular rhythm, normal rate, no rub, no murmur  EDEMA: No LE edema bilaterally  ABDOMEN: soft, nondistended, nontender, bowel sounds normal  MS: extremities normal - no gross deformities noted, no evidence of inflammation in joints, no muscle tenderness  SKIN: no rash  TX KIDNEY: normal    Results:   Reviewed    Patient was seen and evaluated by me, Benjamin Beltran MD. I have reviewed the note and agree with the the plan of care as documented by the fellow.

## 2017-05-02 NOTE — MR AVS SNAPSHOT
After Visit Summary   5/2/2017    Rosibel Willingham    MRN: [id number]           Patient Information     Date Of Birth          1975        Visit Information        Provider Department      5/2/2017 7:00 AM UC 41 ATC; UC SPEC [facility]        Today's Diagnoses     Status post kidney transplant    -  1      Care Instructions    Dear Rosibel Willingham    Thank you for choosing [facility] ([facility]) for your transplant cares.  The following information is a summary of our appointment as well as important reminders.      Please make sure your phone is available today because I will call to update you with your anti-rejection drug levels and possibly make changes to your anti-rejection dosages.    Additional information:   1. Start taking Cholecalciferol (Vitamin D) 1,000 units daily. May  at pharmacy on 1st floor upon leaving today. Take your first dose upon arriving home today.  2. Start taking Magnesium Oxide 400 mg 2x/day.  May  at pharmacy on 1st floor upon leaving today. Take your first dose upon arriving home today; take second dose yet this evening.    3. Return to [facility] tomorrow at 7 am for labs and assessment.   4. Try to take your Prograf closer to 7-7:30pm this evening, in preparation for lab tomorrow.     We look forward in seeing you on your next appointment here at [facility].  Please don t hesitate to call us at [phone] to reschedule any of your appointments or to speak with one of the [facility] registered nurses.  It was a pleasure taking care of you today.    Sincerely,  Mellissa Antunez RN  [facility]  Specialty Infusion & Procedure Center  [address] [place] [address]  [address]  Phone:  [phone]          Follow-ups after your visit        Your next 10 appointments already scheduled     May 15, 2017  2:00 PM CDT   (Arrive by  "1:45 PM)   Kidney Post Op with Leanne Montelongo MD   [facility] Solid Organ Transplant ([facility])    [address] [facility] [id number] [phone]            May 22, 2017   Procedure with Leanne Montelongo MD   [facility] [place], Same Day Surgery (--)    [address]  [place] [address]   [phone]            May 22, 2017  1:00 PM CDT   (Arrive by 12:30 PM)   Return Kidney Transplant with Benjamin Beltran MD   [facility] Nephrology ([facility])    [address] [id number] [phone]            Jul 24, 2017  1:10 PM CDT   (Arrive by 12:40 PM)   Return Kidney Transplant with Benjamin Beltran MD   [facility] Nephrology ([facility])    [address] [id number] [phone]              Who to contact     If you have questions or need follow up information about today's clinic visit or your schedule please contact [facility] SPECIALTY AND PROCEDURE directly at [phone].  Normal or non-critical lab and imaging results will be communicated to you by Nanochiphart, letter or phone within 4 business days after the clinic has received the results. If you do not hear from us within 7 days, please contact the clinic through Nanochiphart or phone. If you have a critical or abnormal lab result, we will notify you by phone as soon as possible.  Submit refill requests through Android [employer] or call your pharmacy and they will forward the refill request to us. Please allow 3 business days for your refill to be completed.          Additional Information About Your Visit        Android App Review Source Information     [employer] lets you send messages to your doctor, view your test results, renew your prescriptions, schedule appointments and more. To sign up, go to [email or web address] . Click on \"Log in\" on the left side of the screen, which will take you to the " "Welcome page. Then click on \"Sign up Now\" on the right side of the page.     You will be asked to enter the access code listed below, as well as some personal information. Please follow the directions to create your username and password.     Your access code is: [email or web address]  Expires: 2017  2:16 PM     Your access code will  in 90 days. If you need help or a new code, please call your [place] clinic or [phone].        Care EveryWhere ID     This is your Care EveryWhere ID. This could be used by other organizations to access your [place] medical records  [id number]        Your Vitals Were     Temperature Pulse Oximetry BMI (Body Mass Index)             98.2  F (36.8  C) (Oral) 100% 20.39 kg/m2          Blood Pressure from Last 3 Encounters:   17 (!) 170/97   17 162/90   17 (!) 160/97    Weight from Last 3 Encounters:   17 50.6 kg (111 lb 8 oz)   17 50.5 kg (111 lb 6.4 oz)   17 56.9 kg (125 lb 8 oz)              We Performed the Following     Basic metabolic panel     CBC with platelets differential     Magnesium     Phosphorus     Tacrolimus level          Today's Medication Changes          These changes are accurate as of: 17  9:56 AM.  If you have any questions, ask your nurse or doctor.               Start taking these medicines.        Dose/Directions    cholecalciferol 1000 UNITS capsule   Commonly known as:  vitamin  -D   Used for:  Vitamin D deficiency   Started by:  Benjamin Beltran MD        Dose:  1 capsule   Take 1 capsule (1,000 Units) by mouth daily   Quantity:  30 capsule   Refills:  11       magnesium oxide 400 MG tablet   Commonly known as:  MAG-OX   Used for:  Hypomagnesemia   Started by:  Benjamin Beltran MD        Dose:  400 mg   Take 1 tablet (400 mg) by mouth 2 times daily   Quantity:  120 tablet   Refills:  3         These medicines have changed or have updated prescriptions.        Dose/Directions    * tacrolimus 1 MG capsule "   Commonly known as:  PROGRAF - GENERIC EQUIVALENT   This may have changed:  how much to take   Used for:  Kidney replaced by transplant, Immunosuppression (H)        Dose:  2 mg   Take 2 capsules (2 mg) by mouth 2 times daily   Quantity:  120 capsule   Refills:  11       * tacrolimus 0.5 MG capsule   Commonly known as:  PROGRAF - GENERIC EQUIVALENT   This may have changed:  Another medication with the same name was changed. Make sure you understand how and when to take each.   Used for:  Kidney replaced by transplant        Dose:  0.5 mg   Take 1 capsule (0.5 mg) by mouth 2 times daily   Quantity:  60 capsule   Refills:  11       * Notice:  This list has 2 medication(s) that are the same as other medications prescribed for you. Read the directions carefully, and ask your doctor or other care provider to review them with you.         Where to get your medicines      These medications were sent to [address] [address]    Hours:  TRANSPLANT PHONE NUMBER [phone] Phone:  [phone]     cholecalciferol 1000 UNITS capsule    magnesium oxide 400 MG tablet                Primary Care Provider    Physician No Ref-Primary       No address on file        Thank you!     Thank you for choosing [facility] SPECIALTY AND PROCEDURE  for your care. Our goal is always to provide you with excellent care. Hearing back from our patients is one way we can continue to improve our services. Please take a few minutes to complete the written survey that you may receive in the mail after your visit with us. Thank you!             Your Updated Medication List - Protect others around you: Learn how to safely use, store and throw away your medicines at www.disposemymeds.org.          This list is accurate as of: 5/2/17  9:56 AM.  Always use your most recent med list.                   Brand Name Dispense  Instructions for use    acetaminophen 325 MG tablet    TYLENOL    100 tablet    Take 2 tablets (650 mg) by mouth every 4 hours as needed for mild pain or fever       amLODIPine 5 MG tablet    NORVASC    30 tablet    Take 2 tablets (10 mg) by mouth daily       aspirin 325 MG EC tablet     30 tablet    Take 1 tablet (325 mg) by mouth daily       cholecalciferol 1000 UNITS capsule    vitamin  -D    30 capsule    Take 1 capsule (1,000 Units) by mouth daily       magnesium oxide 400 MG tablet    MAG-OX    120 tablet    Take 1 tablet (400 mg) by mouth 2 times daily       mycophenolate 250 MG capsule    CELLCEPT - GENERIC EQUIVALENT    180 capsule    Take 3 capsules (750 mg) by mouth 2 times daily       ondansetron 4 MG tablet    ZOFRAN    30 tablet    Take 1 tablet (4 mg) by mouth every 8 hours as needed for nausea       senna-docusate 8.6-50 MG per tablet    SENOKOT-S;PERICOLACE    100 tablet    Take 1-2 tablets by mouth daily as needed for constipation       sulfamethoxazole-trimethoprim 400-80 MG per tablet    BACTRIM/SEPTRA    12 tablet    Take 1 tablet by mouth Every Mon, Wed, Fri Morning       * tacrolimus 1 MG capsule    PROGRAF - GENERIC EQUIVALENT    120 capsule    Take 2 capsules (2 mg) by mouth 2 times daily       * tacrolimus 0.5 MG capsule    PROGRAF - GENERIC EQUIVALENT    60 capsule    Take 1 capsule (0.5 mg) by mouth 2 times daily       valGANciclovir 450 MG tablet    VALCYTE    8 tablet    Take 1 tablet (450 mg) by mouth twice a week       * Notice:  This list has 2 medication(s) that are the same as other medications prescribed for you. Read the directions carefully, and ask your doctor or other care provider to review them with you.

## 2017-05-02 NOTE — LETTER
OUTPATIENT LABORATORY TEST ORDER    Patient Name:Rosibel Willingham   Transplant Date: 4/23/2017  YOB: 1975  Issue Date & Time: May 2, 2017 10:38 AM     [facility] MR: [id number]  Expiration Date:  (1 year after date issued)        Diagnoses: Aftercare following organ transplant (ICD-10 Z48.288)   Kidney Transplant (ICD-10  Z94.0)   Long term use of medications (ICD-10  Z79.899)     ?Lab results to be available on the same day drawn.   ?Patient should release information to the [facility], [facility].  ?Please fax to the Transplant Center at [phone].    3x/week, First 4Weeks post-transplant    4/23/2017 - 5/23/2017    2x/week, Months 2-3 post-transplant    5/24/2017 - 7/24/2017       1x/week, Months 4-6 post-transplant    7/25/2017 - 10/25/2017  Every 2 weeks, Months 7-9 post-transplant    10/26/2017 - 1/26/2018  Every 3 weeks, Months 10-12 post-transplant   1/27/2018 - 4/23/2018       ?Hemogram and Platelet   ?Basic Metabolic Panel (Sodium, Potassium,Chloride, CO2, Creatinine, Urea Nitrogen, Glucose, Calcium)   ?Prograf/Tacrolimus drug level     Weekly through first 3 months post-transplant    4/23/2017 - 7/23/2017   ?Phosphorus, Magnesium   ?MPA level (mycophenolic acid) once per week for first 3 months.    ?Please add a diff to hemogram once per week for the first 3 months.    Monthly   ? BK PCR Quantitative (Polyoma virus - blood in purple top tube to Reference Lab)    At 6 & 12 months post-transplant         10/2017 & 4/2018   ? HBsurfaceAb, HBcoreAb, HCV at 6 months only -   ? Liver Function Tests(Bilirubin Direct/Total, AST, ALT, Alkaline Phosphatase)   ?Complete Lipid Panel fasting (Cholesterol, Triglycerides, HDL, LDL)   ? Random Urine for Protein/Creatinineratio    At month(s) 1, 2, 4, 6, 9, 12      5/2017, 6/2017, 8/2017, 10/2017, 1/2018, 4/2018                  ? PRA/DSA level (mailers provided by the patient)                     If you have any  questions, please call The Transplant Center at [phone] or [phone].    Please faxall results to [phone].  .

## 2017-05-03 ENCOUNTER — OFFICE VISIT (OUTPATIENT)
Dept: INFUSION THERAPY | Facility: CLINIC | Age: 42
End: 2017-05-03
Attending: INTERNAL MEDICINE
Payer: COMMERCIAL

## 2017-05-03 VITALS — TEMPERATURE: 97.1 F | OXYGEN SATURATION: 100 % | BODY MASS INDEX: 20.17 KG/M2 | WEIGHT: 110.3 LBS

## 2017-05-03 DIAGNOSIS — Z94.0 KIDNEY REPLACED BY TRANSPLANT: Primary | ICD-10-CM

## 2017-05-03 DIAGNOSIS — Z94.0 STATUS POST KIDNEY TRANSPLANT: Primary | ICD-10-CM

## 2017-05-03 DIAGNOSIS — Z48.298 AFTERCARE FOLLOWING ORGAN TRANSPLANT: ICD-10-CM

## 2017-05-03 DIAGNOSIS — D84.9 IMMUNOSUPPRESSED STATUS (H): ICD-10-CM

## 2017-05-03 LAB
ALBUMIN UR-MCNC: 30 MG/DL
ANION GAP SERPL CALCULATED.3IONS-SCNC: 13 MMOL/L (ref 3–14)
APPEARANCE UR: CLEAR
BASOPHILS # BLD AUTO: 0 10E9/L (ref 0–0.2)
BASOPHILS NFR BLD AUTO: 0.3 %
BILIRUB UR QL STRIP: NEGATIVE
BUN SERPL-MCNC: 68 MG/DL (ref 7–30)
CALCIUM SERPL-MCNC: 9.2 MG/DL (ref 8.5–10.1)
CHLORIDE SERPL-SCNC: 110 MMOL/L (ref 94–109)
CO2 SERPL-SCNC: 19 MMOL/L (ref 20–32)
COLOR UR AUTO: ABNORMAL
CREAT SERPL-MCNC: 5.88 MG/DL (ref 0.52–1.04)
DIFFERENTIAL METHOD BLD: ABNORMAL
EOSINOPHIL # BLD AUTO: 0.2 10E9/L (ref 0–0.7)
EOSINOPHIL NFR BLD AUTO: 3.4 %
ERYTHROCYTE [DISTWIDTH] IN BLOOD BY AUTOMATED COUNT: 15.5 % (ref 10–15)
GFR SERPL CREATININE-BSD FRML MDRD: 8 ML/MIN/1.7M2
GLUCOSE SERPL-MCNC: 97 MG/DL (ref 70–99)
GLUCOSE UR STRIP-MCNC: 50 MG/DL
HCT VFR BLD AUTO: 24.7 % (ref 35–47)
HGB BLD-MCNC: 8 G/DL (ref 11.7–15.7)
HGB UR QL STRIP: ABNORMAL
IMM GRANULOCYTES # BLD: 0.1 10E9/L (ref 0–0.4)
IMM GRANULOCYTES NFR BLD: 1 %
KETONES UR STRIP-MCNC: NEGATIVE MG/DL
LEUKOCYTE ESTERASE UR QL STRIP: NEGATIVE
LYMPHOCYTES # BLD AUTO: 0.3 10E9/L (ref 0.8–5.3)
LYMPHOCYTES NFR BLD AUTO: 4.2 %
MAGNESIUM SERPL-MCNC: 1.6 MG/DL (ref 1.6–2.3)
MCH RBC QN AUTO: 28.5 PG (ref 26.5–33)
MCHC RBC AUTO-ENTMCNC: 32.4 G/DL (ref 31.5–36.5)
MCV RBC AUTO: 88 FL (ref 78–100)
MONOCYTES # BLD AUTO: 0.3 10E9/L (ref 0–1.3)
MONOCYTES NFR BLD AUTO: 5.4 %
NEUTROPHILS # BLD AUTO: 5.4 10E9/L (ref 1.6–8.3)
NEUTROPHILS NFR BLD AUTO: 85.7 %
NITRATE UR QL: NEGATIVE
NRBC # BLD AUTO: 0 10*3/UL
NRBC BLD AUTO-RTO: 0 /100
PH UR STRIP: 6 PH (ref 5–7)
PHOSPHATE SERPL-MCNC: 5.6 MG/DL (ref 2.5–4.5)
PLATELET # BLD AUTO: 325 10E9/L (ref 150–450)
POTASSIUM SERPL-SCNC: 4 MMOL/L (ref 3.4–5.3)
RBC # BLD AUTO: 2.81 10E12/L (ref 3.8–5.2)
RBC #/AREA URNS AUTO: 15 /HPF (ref 0–2)
SODIUM SERPL-SCNC: 142 MMOL/L (ref 133–144)
SP GR UR STRIP: 1.01 (ref 1–1.03)
SQUAMOUS #/AREA URNS AUTO: 1 /HPF (ref 0–1)
TACROLIMUS BLD-MCNC: 6.8 UG/L (ref 5–15)
TME LAST DOSE: NORMAL H
URN SPEC COLLECT METH UR: ABNORMAL
UROBILINOGEN UR STRIP-MCNC: 0 MG/DL (ref 0–2)
WBC # BLD AUTO: 6.3 10E9/L (ref 4–11)
WBC #/AREA URNS AUTO: 3 /HPF (ref 0–2)

## 2017-05-03 PROCEDURE — 96361 HYDRATE IV INFUSION ADD-ON: CPT

## 2017-05-03 PROCEDURE — 99215 OFFICE O/P EST HI 40 MIN: CPT

## 2017-05-03 PROCEDURE — 85025 COMPLETE CBC W/AUTO DIFF WBC: CPT | Performed by: INTERNAL MEDICINE

## 2017-05-03 PROCEDURE — 81001 URINALYSIS AUTO W/SCOPE: CPT | Performed by: INTERNAL MEDICINE

## 2017-05-03 PROCEDURE — 83735 ASSAY OF MAGNESIUM: CPT | Performed by: INTERNAL MEDICINE

## 2017-05-03 PROCEDURE — 80197 ASSAY OF TACROLIMUS: CPT | Performed by: INTERNAL MEDICINE

## 2017-05-03 PROCEDURE — 84100 ASSAY OF PHOSPHORUS: CPT | Performed by: INTERNAL MEDICINE

## 2017-05-03 PROCEDURE — 80048 BASIC METABOLIC PNL TOTAL CA: CPT | Performed by: INTERNAL MEDICINE

## 2017-05-03 PROCEDURE — 25000128 H RX IP 250 OP 636: Mod: ZF | Performed by: INTERNAL MEDICINE

## 2017-05-03 PROCEDURE — 99215 OFFICE O/P EST HI 40 MIN: CPT | Mod: 25

## 2017-05-03 PROCEDURE — 36415 COLL VENOUS BLD VENIPUNCTURE: CPT

## 2017-05-03 PROCEDURE — 96360 HYDRATION IV INFUSION INIT: CPT

## 2017-05-03 RX ADMIN — SODIUM CHLORIDE 2000 ML: 9 INJECTION, SOLUTION INTRAVENOUS at 08:45

## 2017-05-03 NOTE — MR AVS SNAPSHOT
After Visit Summary   5/3/2017    Rosibel Willingham    MRN: [id number]           Patient Information     Date Of Birth          1975        Visit Information        Provider Department      5/3/2017 8:00 AM Benjamin Beltran MD [facility] Specialty and Procedure        Today's Diagnoses     Kidney replaced by transplant    -  1    Immunosuppressed status (H)        Aftercare following organ transplant           Follow-ups after your visit        Your next 10 appointments already scheduled     May 15, 2017  2:00 PM CDT   (Arrive by 1:45 PM)   Kidney Post Op with Leanne Montelongo MD   [facility] Solid Organ Transplant ([facility])    [address] [place] [id number] [phone]            May 22, 2017   Procedure with Leanne Montelongo MD   [facility], [place], Same Day Surgery (--)    [address] [facility] [address] [phone]            May 22, 2017  1:00 PM CDT   (Arrive by 12:30 PM)   Return Kidney Transplant with Benjamin Beltran MD   [facility] Nephrology ([facility])    [address] [place] [id number] [phone]            Jul 24, 2017  1:10 PM CDT   (Arrive by 12:40 PM)   Return Kidney Transplant with Benjamin Beltran MD   [facility] Nephrology ([facility])    [address] [place] [id number] [phone]              Who to contact     If you have questions or need follow up information about today's clinic visit or your schedule please contact [facility] SPECIALTY AND PROCEDURE directly at [phone].  Normal or non-critical lab and imaging results will be communicated to you by MyChart, letter or phone within 4 business days after the clinic has received the results. If you do not hear from us within 7 days, please contact the clinic through MyChart or phone. If you have a critical or  "abnormal lab result, we will notify you by phone as soon as possible.  Submit refill requests through Blast[employer]s or call your pharmacy and they will forward the refill request to us. Please allow 3 business days for your refill to be completed.          Additional Information About Your Visit        Jolancer[employer] Information     [employer]s lets you send messages to your doctor, view your test results, renew your prescriptions, schedule appointments and more. To sign up, go to [email or web address] . Click on \"Log in\" on the left side of the screen, which will take you to the Welcome page. Then click on \"Sign up Now\" on the right side of the page.     You will be asked to enter the access code listed below, as well as some personal information. Please follow the directions to create your username and password.     Your access code is: [email or web address]  Expires: 2017  2:16 PM     Your access code will  in 90 days. If you need help or a new code, please call your [place] clinic or [phone].        Care EveryWhere ID     This is your Care EveryWhere ID. This could be used by other organizations to access your [place] medical records  [id number]         Blood Pressure from Last 3 Encounters:   17 113/73   17 128/75   17 (P) 145/83    Weight from Last 3 Encounters:   17 48.7 kg (107 lb 4.8 oz)   17 49.1 kg (108 lb 3.2 oz)   17 50.2 kg (110 lb 9.6 oz)              We Performed the Following     Routine UA with micro reflex to culture     US Renal Transplant        Primary Care Provider    Physician No Ref-Primary       No address on file        Thank you!     Thank you for choosing [facility] SPECIALTY AND PROCEDURE  for your care. Our goal is always to provide you with excellent care. Hearing back from our patients is one way we can continue to improve our services. Please take a few minutes to complete the written survey that you may receive in the mail " after your visit with us. Thank you!             Your Updated Medication List - Protect others around you: Learn how to safely use, store and throw away your medicines at www.disposemymeds.org.          This list is accurate as of: 5/3/17 11:59 PM.  Always use your most recent med list.                   Brand Name Dispense Instructions for use    acetaminophen 325 MG tablet    TYLENOL    100 tablet    Take 2 tablets (650 mg) by mouth every 4 hours as needed for mild pain or fever       amLODIPine 5 MG tablet    NORVASC    30 tablet    Take 2 tablets (10 mg) by mouth daily       aspirin 325 MG EC tablet     30 tablet    Take 1 tablet (325 mg) by mouth daily       cholecalciferol 1000 UNITS capsule    vitamin  -D    30 capsule    Take 1 capsule (1,000 Units) by mouth daily       magnesium oxide 400 MG tablet    MAG-OX    120 tablet    Take 1 tablet (400 mg) by mouth 2 times daily       mycophenolate 250 MG capsule    CELLCEPT - GENERIC EQUIVALENT    180 capsule    Take 3 capsules (750 mg) by mouth 2 times daily       ondansetron 4 MG tablet    ZOFRAN    30 tablet    Take 1 tablet (4 mg) by mouth every 8 hours as needed for nausea       senna-docusate 8.6-50 MG per tablet    SENOKOT-S;PERICOLACE    100 tablet    Take 1-2 tablets by mouth daily as needed for constipation       sulfamethoxazole-trimethoprim 400-80 MG per tablet    BACTRIM/SEPTRA    12 tablet    Take 1 tablet by mouth Every Mon, Wed, Fri Morning       tacrolimus 0.5 MG capsule    PROGRAF - GENERIC EQUIVALENT    60 capsule    Take 1 capsule (0.5 mg) by mouth 2 times daily       valGANciclovir 450 MG tablet    VALCYTE    8 tablet    Take 1 tablet (450 mg) by mouth twice a week

## 2017-05-03 NOTE — PATIENT INSTRUCTIONS
Dear Rosibel Willingham    Thank you for choosing [facility] Specialty Infusion and Procedure Center ([place] [facility]) for your transplant cares.  The following information is a summary of our appointment as well as important reminders.      Please make sure your phone is available today because I will call to update you with your anti-rejection drug levels and possibly make changes to your anti-rejection dosages.  (I will call both your and Ian's phone today with Prograf results)    Additional information:   1. Check in on 1st floor lab/imaging for a 12 noon appointment to have a renal ultrasound performed.   2. Try to increase your fluid intake today. The goal should be a minimum of 4 bottles of water, and closer to 6 bottles daily if possible.   3. Keep using Zofran as needed to help relieve nausea and improve food intake.   4. Return to Specialty Infusion tomorrow at 7 am for follow up labs and assessment.   5. Keep up the good work!! Despite one step back today, two steps ahead tomorrow. Hang in there, you're doing great!!      We look forward in seeing you on your next appointment here at [facility].  Please don t hesitate to call us at [phone] to reschedule any of your appointments or to speak with one of the [facility] registered nurses.  It was a pleasure taking care of you today.    Sincerely,  Mellissa Antunez RN  [facility]  Specialty Infusion & Procedure Center  [address] [place] [address]  [address]  Phone:  [phone]

## 2017-05-03 NOTE — PROGRESS NOTES
"Rosibel Willingham came to [facility] today for a lab and assess following a DDKT transplant on 4/23/17.      Discharge date: 4/27/17  Transplant coordinator: Chinyere Burnham  Phone number patient can be reached at: Rosibel cell: [phone]  Please call  aIn frank as well with Prograf dose: [phone]    TXP Coordinator has been out of the office at a conference this week and has not had the opportunity to meet with pt. Please contact Coordinator on Thurs. 5/4 to request mailers.     Pt to return to [facility] on 5/4/17 d/t plateau of condition. Continues to require additional IVFs d/t limited nutritional intake/lack of appetite. Slow graft function and slow improvement of Creatinine and GFR noted.     Physical Assessment:  See physical assessment located under \"Document Flowsheets\".  Incision site: C/D/I secured with dermabond; edges well approximated, no warmth, no erythema, no exudate  Lines: OCTAVIO drain consistently patent and consistently draining 100 ml serous sanguineous fluid q 24 hours x 3 days  Huang: n/a  Urine clarity: clear yellow urine; no foul odor; output of about 3L over past 24 hours; UA/UC specimen collected d/t c/o intermittent sharp pain to pelvic region  Hydration: increased fluid intake encouraged; pt had intake of just under 2L over past 24 hours  Nutrition: minimal appetite and intake yesterday; only 3 very small meals consumed d/t lack of appeal, gag reflex, and increased N q morning; taking PRN Zofran as ordered   Last BM: soft BM last evening; still soft/non-formed d/t limited solid food intake  Pain: no pain at rest; rated \"4/10\" with activity, pain well controlled with PRN Tylenol, not taking narcotic medication      Laboratory tests: Standard labs drawn.    Plan of care for today: lab and assessment, rounding by Nephrology, review of lab results, incisional and drain care, finalization of checklist education, IV infusion of 1 L NS, urine specimen collected for culture and renal US performed d/t " intermittent sharp pain to pelvic region       Medication changes: none; continue same dose of Prograf as noted below    Medications administered: Pt took all scheduled AM medications following lab draw. Also received 1 L NS via IV today and took 650 mg PRN Tylenol for pain.            Patient education:    The following teaching topics were addressed: Importance of drinking 2L of non-caffeinated fluids daily, Incisional care, Signs/symptoms of infection, Good handwashing, Medications (purposes, doses and times of administration), 6D discharge check list, Plan of care and Drain care   Patient and  Ian verbalized understanding and all questions answered.    Drug level:  Prograf level today reviewed with Dr Cohen who gave orders to continue current dose of 3 mg BID.  Patient was updated with this information via phone and verbalized understanding.    Face to face time: 20  Discharge Plan    Pt will follow up with [facility] for lab and assessment at 0700 tomorrow.  Discharge instructions reviewed with patient: YES  Patient/Representative verbalized understanding, all questions answered: YES    Discharged from unit at 1120 with whom:  to 1st floor Imaging for Renal US.    Mellissa Antunez RN

## 2017-05-03 NOTE — MR AVS SNAPSHOT
After Visit Summary   5/3/2017    Rosibel Willingham    MRN: [id number]           Patient Information     Date Of Birth          1975        Visit Information        Provider Department      5/3/2017 7:00 AM UC 43 ATC;  [facility]        Today's Diagnoses     Status post kidney transplant    -  1      Care Instructions    Dear Rosibel Willingham    Thank you for choosing [facility] ([facility]) for your transplant cares.  The following information is a summary of our appointment as well as important reminders.      Please make sure your phone is available today because I will call to update you with your anti-rejection drug levels and possibly make changes to your anti-rejection dosages.  (I will call both your and Ian's phone today with Prograf results)    Additional information:   1. Check in on 1st floor lab/imaging for a 12 noon appointment to have a renal ultrasound performed.   2. Try to increase your fluid intake today. The goal should be a minimum of 4 bottles of water, and closer to 6 bottles daily if possible.   3. Keep using Zofran as needed to help relieve nausea and improve food intake.   4. Return to Specialty Infusion tomorrow at 7 am for follow up labs and assessment.   5. Keep up the good work!! Despite one step back today, two steps ahead tomorrow. Hang in there, you're doing great!!      We look forward in seeing you on your next appointment here at [facility].  Please don t hesitate to call us at [phone] to reschedule any of your appointments or to speak with one of the [facility] registered nurses.  It was a pleasure taking care of you today.    Sincerely,  Mellissa Antunez RN  [facility]  Special[facility]fusion [facility] Center  [address] [place] [address]  [address]  Phone:  [phone]          Follow-ups after your visit        Your  next 10 appointments already scheduled     May 03, 2017 12:00 PM CDT   US RENAL TRANSPLANT with UCUS3   [facility] US ([facility])    [address] [place] [address]  1st [facility] [id number] [phone]           Please bring a list of your medicines (including vitamins, minerals and over-the-counter drugs). Also, tell your doctor about any allergies you may have. Wear comfortable clothes and leave your valuables at home.  You do not need to do anything special to prepare for your exam.  Please call the Imaging Department at your exam site with any questions.            May 04, 2017  7:00 AM CDT   (Arrive by 6:45 AM)   New Transplant Visit with [facility], UC 43 ATC   [facility] ([facility])    [address] [place] [id number] [phone]            May 04, 2017  8:00 AM CDT   (Arrive by 7:45 AM)   Return with Benjamin Beltran MD   [facility] Special[facility] and Procedure ([facility])    [address] [place] [id number]   [phone]            May 15, 2017  2:00 PM CDT   (Arrive by 1:45 PM)   Kidney Post Op with Leanne Montelongo MD   [facility] Solid Organ Transplant ([facility])    [address] [place] [id number] [phone]            May 22, 2017   Procedure with Leanne Montelongo MD   [facility] [place], Same Day Surgery (--)    [address] [facility] [address]   [phone]            May 22, 2017  1:00 PM CDT   (Arrive by 12:30 PM)   Return Kidney Transplant with Benjamin Beltran MD   [facility] Nephrology ([facility])    [address] [place] [id number]   [phone]            Jul 24, 2017  1:10 PM CDT   (Arrive by 12:40 PM)   Return Kidney Transplant with Benjamin Beltran MD   [facility]  Nephrology ([facility] [place])    [address] [facility]  3rd Floor  [facility] [address] [phone]              Future tests that were ordered for you today     Open Standing Orders        Priority Remaining Interval Expires Ordered    Basic metabolic panel Routine 4/4 Every 3 Weeks 6/6/2018 5/2/2017    Phosphorus Routine 12/12 Weekly 8/30/2017 5/2/2017    Magnesium Routine 12/12 Weekly 8/30/2017 5/2/2017    Mycophenolic acid Routine 12/12 Weekly 8/30/2017 5/2/2017    WBC Differential Routine 12/12 Weekly 8/30/2017 5/2/2017    Hepatic panel Routine 2/2 Every 6 Months 6/6/2018 5/2/2017    Lipid Profile Routine 2/2 Every 6 Months 6/6/2018 5/2/2017    Protein  random urine Routine 2/2 Every 6 Months 6/6/2018 5/2/2017    Tacrolimus level Routine 12/12 3X Week 6/6/2017 5/2/2017    Tacrolimus level Routine 16/16 2X Week 10/9/2017 5/2/2017    Tacrolimus level Routine 12/12 Weekly 11/8/2017 5/2/2017    Tacrolimus level Routine 6/6 Every Other Week 2/6/2018 5/2/2017    Tacrolimus level Routine 4/4 Every 3 Weeks 6/6/2018 5/2/2017    PRA Donor Specific Antibody Routine 6/6 6/6/2018 5/2/2017    BK virus PCR quantitative Routine 12/12 Monthly 6/6/2018 5/2/2017    PRA Donor Specific Antibody Routine 6/6 6/6/2018 5/2/2017    CBC with platelets Routine 12/12 3X Week 6/6/2017 5/2/2017    Basic metabolic panel Routine 12/12 3X Week 6/6/2017 5/2/2017    CBC with platelets Routine 16/16 2X Week 10/9/2017 5/2/2017    Basic metabolic panel Routine 16/16 2X Week 10/9/2017 5/2/2017    CBC with platelets Routine 12/12 Weekly 11/8/2017 5/2/2017    Basic metabolic panel Routine 12/12 Weekly 11/8/2017 5/2/2017    CBC with platelets Routine 6/6 Every Other Week 2/6/2018 5/2/2017    Basic metabolic panel Routine 6/6 Every Other Week 2/6/2018 5/2/2017    CBC with platelets Routine 4/4 Every 3 Weeks 6/6/2018 5/2/2017          Open Future Orders        Priority Expected Expires Ordered    Hepatitis B Surface Antibody  "Routine 10/29/2017 2017 2017    Hepatitis B core antibody Routine 10/29/2017 2017 2017    Hepatitis C antibody Routine 10/29/2017 2017 2017            Who to contact     If you have questions or need follow up information about today's clinic visit or your schedule please contact [facility] SPECIALTY AND PROCEDURE directly at [phone].  Normal or non-critical lab and imaging results will be communicated to you by [employer], letter or phone within 4 business days after the clinic has received the results. If you do not hear from us within 7 days, please contact the clinic through [employer] or phone. If you have a critical or abnormal lab result, we will notify you by phone as soon as possible.  Submit refill requests through [employer] or call your pharmacy and they will forward the refill request to us. Please allow 3 business days for your refill to be completed.          Additional Information About Your Visit        [employer] Information     [employer] lets you send messages to your doctor, view your test results, renew your prescriptions, schedule appointments and more. To sign up, go to [email or web address] . Click on \"Log in\" on the left side of the screen, which will take you to the Welcome page. Then click on \"Sign up Now\" on the right side of the page.     You will be asked to enter the access code listed below, as well as some personal information. Please follow the directions to create your username and password.     Your access code is: [email or web address]  Expires: 2017  2:16 PM     Your access code will  in 90 days. If you need help or a new code, please call your [facility] clinic or [phone].        Care EveryWhere ID     This is your Care EveryWhere ID. This could be used by other organizations to access your [facility] medical records  [id number]        Your Vitals Were     Temperature Pulse Oximetry BMI (Body Mass Index)             97.1  F " (36.2  C) (Oral) 100% 20.17 kg/m2          Blood Pressure from Last 3 Encounters:   05/01/17 (!) 170/97   04/28/17 162/90   04/27/17 (!) 160/97    Weight from Last 3 Encounters:   05/03/17 50 kg (110 lb 4.8 oz)   05/02/17 50.6 kg (111 lb 8 oz)   05/01/17 50.5 kg (111 lb 6.4 oz)              We Performed the Following     Basic metabolic panel     CBC with platelets differential     Magnesium     Phosphorus     Tacrolimus level          Today's Medication Changes          These changes are accurate as of: 5/3/17 10:59 AM.  If you have any questions, ask your nurse or doctor.               These medicines have changed or have updated prescriptions.        Dose/Directions    * tacrolimus 1 MG capsule   Commonly known as:  PROGRAF - GENERIC EQUIVALENT   This may have changed:  how much to take   Used for:  Kidney replaced by transplant, Immunosuppression (H)        Dose:  2 mg   Take 2 capsules (2 mg) by mouth 2 times daily   Quantity:  120 capsule   Refills:  11       * tacrolimus 0.5 MG capsule   Commonly known as:  PROGRAF - GENERIC EQUIVALENT   This may have changed:  Another medication with the same name was changed. Make sure you understand how and when to take each.   Used for:  Kidney replaced by transplant        Dose:  0.5 mg   Take 1 capsule (0.5 mg) by mouth 2 times daily   Quantity:  60 capsule   Refills:  11       * Notice:  This list has 2 medication(s) that are the same as other medications prescribed for you. Read the directions carefully, and ask your doctor or other care provider to review them with you.             Primary Care Provider    Physician No Ref-Primary       No address on file        Thank you!     Thank you for choosing [facility] SPECIAL[facility] PROCEDURE  for your care. Our goal is always to provide you with excellent care. Hearing back from our patients is one way we can continue to improve our services. Please take a few minutes to complete the written survey  that you may receive in the mail after your visit with us. Thank you!             Your Updated Medication List - Protect others around you: Learn how to safely use, store and throw away your medicines at www.disposemymeds.org.          This list is accurate as of: 5/3/17 10:59 AM.  Always use your most recent med list.                   Brand Name Dispense Instructions for use    acetaminophen 325 MG tablet    TYLENOL    100 tablet    Take 2 tablets (650 mg) by mouth every 4 hours as needed for mild pain or fever       amLODIPine 5 MG tablet    NORVASC    30 tablet    Take 2 tablets (10 mg) by mouth daily       aspirin 325 MG EC tablet     30 tablet    Take 1 tablet (325 mg) by mouth daily       cholecalciferol 1000 UNITS capsule    vitamin  -D    30 capsule    Take 1 capsule (1,000 Units) by mouth daily       magnesium oxide 400 MG tablet    MAG-OX    120 tablet    Take 1 tablet (400 mg) by mouth 2 times daily       mycophenolate 250 MG capsule    CELLCEPT - GENERIC EQUIVALENT    180 capsule    Take 3 capsules (750 mg) by mouth 2 times daily       ondansetron 4 MG tablet    ZOFRAN    30 tablet    Take 1 tablet (4 mg) by mouth every 8 hours as needed for nausea       senna-docusate 8.6-50 MG per tablet    SENOKOT-S;PERICOLACE    100 tablet    Take 1-2 tablets by mouth daily as needed for constipation       sulfamethoxazole-trimethoprim 400-80 MG per tablet    BACTRIM/SEPTRA    12 tablet    Take 1 tablet by mouth Every Mon, Wed, Fri Morning       * tacrolimus 1 MG capsule    PROGRAF - GENERIC EQUIVALENT    120 capsule    Take 2 capsules (2 mg) by mouth 2 times daily       * tacrolimus 0.5 MG capsule    PROGRAF - GENERIC EQUIVALENT    60 capsule    Take 1 capsule (0.5 mg) by mouth 2 times daily       valGANciclovir 450 MG tablet    VALCYTE    8 tablet    Take 1 tablet (450 mg) by mouth twice a week       * Notice:  This list has 2 medication(s) that are the same as other medications prescribed for you. Read the  directions carefully, and ask your doctor or other care provider to review them with you.

## 2017-05-03 NOTE — PROGRESS NOTES
Assessment and Plan:  1. DDKT - Patient has DGF requiring dialysis.  Drain in place.  Continue prograf at 3mg bid.  Will check renal ultrasound today, UA/UC, and give 2 L IV fluids today.  2. Hypertension - patient is hypovolemic on exam.  Continue amlodipine to 10 mg daily.    3. Hypovolemic - give 2 L normal saline and monitor for improvement in orthostatic symptoms  4. Anemia - stable hemoglobin. Monitor for aranesp needs (not on it previously)  Patient is iron replete.   5. Renal Secondary Hyperparathyroidism - mildly elevated PTH at 149.  Patient has mild vitamin d insufficiency.  Recheck PTH at 3 months.  6. Vitamin D insufficiency - at 29.  ON cholecalciferol 1000 units daily  7. Prophylaxis - EBV D+R+, CMV D+R+.  Valcyte for 3 months.  8. Nausea - continue zofran for nausea  9. Hypomagnesemia - continue magnesium oxide 400 mg bid    Follow up tomorrow    Assessment and plan was discussed with patient and she voiced her understanding and agreement.    Reason for Visit:  Ms. Willingham is here for hospital follow up following kidney transplant.    HPI:   Rosibel Willingham is a 41 year old female with ESKD from unknown etiology and is status post DDKT on 4/23/17.         Transplant Hx:       Tx: DDKT  Date: 4/23/17       Present Maintenance IS: Tacrolimus and Mycophenolate mofetil       Baseline Creatinine: TBD       Recent DSA: No  PRA ():     Class I:      Class II:        Biopsy: No    Patient with about 1 L of fluid intake and three small meals.  The nausea is still affecting her.  She notes robust urine output ongoing.  Drain output remains at 100 ml/day.  She does note some pain around the pelvic bone but denies dysuria or hematuria.  No edema.  No fevers, chills.  Denies parasthesia.  Her prograf levels are low and she was asked to take 3.5 mg last night.    Home BP: 170/90.      ROS:   A comprehensive review of systems was obtained and negative, except as noted in the HPI or PMH.    Active Medical  Problems:  Patient Active Problem List   Diagnosis     End stage kidney disease (H)     Anemia of chronic kidney failure     Secondary hyperparathyroidism (H)     Hypertension     Kidney transplant candidate     -donor kidney transplant     Immunosuppression (H)     Delayed graft function of kidney     Aftercare following organ transplant       Personal Hx:  Social History     Social History     Marital status:      Spouse name: N/A     Number of children: N/A     Years of education: N/A     Occupational History     Not on file.     Social History Main Topics     Smoking status: Former Smoker     Packs/day: 0.50     Years: 2.00     Types: Cigarettes     Quit date: 1998     Smokeless tobacco: Never Used     Alcohol use 1.2 oz/week     2 Standard drinks or equivalent per week     Drug use: No     Sexual activity: Not on file     Other Topics Concern     Not on file     Social History Narrative       Allergies:  Allergies   Allergen Reactions     Erythromycin Hives       Medications:  Prior to Admission medications    Medication Sig Start Date End Date Taking? Authorizing Provider   tacrolimus (PROGRAF - GENERIC EQUIVALENT) 0.5 MG capsule Take 1 capsule (0.5 mg) by mouth 2 times daily 17   Benjamin Beltran MD   mycophenolate (CELLCEPT - GENERIC EQUIVALENT) 250 MG capsule Take 3 capsules (750 mg) by mouth 2 times daily 17   Tawny Macias PA-C   tacrolimus (PROGRAF - GENERIC EQUIVALENT) 1 MG capsule Take 2 capsules (2 mg) by mouth 2 times daily 17   Tawny Macias PA-C   amLODIPine (NORVASC) 5 MG tablet Take 1 tablet (5 mg) by mouth daily 17   Tawny Macias PA-C   sulfamethoxazole-trimethoprim (BACTRIM/SEPTRA) 400-80 MG per tablet Take 1 tablet by mouth Every Mon, Wed, Fri Morning 17   Tawny Macias PA-C   valGANciclovir (VALCYTE) 450 MG tablet Take 1 tablet (450 mg) by mouth twice a week 17  Tawny Macias PA-C   aspirin EC  325 MG EC tablet Take 1 tablet (325 mg) by mouth daily 4/27/17 4/27/18  Fast, Tawny Alexandre PA-C   acetaminophen (TYLENOL) 325 MG tablet Take 2 tablets (650 mg) by mouth every 4 hours as needed for mild pain or fever 4/27/17   Gilberto, Tawny Alexandre PA-C   senna-docusate (SENOKOT-S;PERICOLACE) 8.6-50 MG per tablet Take 1-2 tablets by mouth daily as needed for constipation 4/27/17   Fast, Tawny Alexandre PA-C       Vitals:  There were no vitals taken for this visit.  + orthostatic    Exam:   GENERAL APPEARANCE: alert and no distress  HENT: mouth without ulcers or lesions  LYMPHATICS: no cervical or supraclavicular nodes  RESP: lungs clear to auscultation - no rales, rhonchi or wheezes  CV: regular rhythm, normal rate, no rub, no murmur  EDEMA: No LE edema bilaterally  ABDOMEN: soft, nondistended, nontender, bowel sounds normal  MS: extremities normal - no gross deformities noted, no evidence of inflammation in joints, no muscle tenderness  SKIN: no rash  TX KIDNEY: normal    Results:   Reviewed    Patient was seen and evaluated by me, Benjamin Beltran MD. I have reviewed the note and agree with the the plan of care as documented by the fellow.

## 2017-05-04 ENCOUNTER — OFFICE VISIT (OUTPATIENT)
Dept: INFUSION THERAPY | Facility: CLINIC | Age: 42
End: 2017-05-04
Attending: INTERNAL MEDICINE
Payer: COMMERCIAL

## 2017-05-04 VITALS
HEART RATE: 89 BPM | BODY MASS INDEX: 20.23 KG/M2 | WEIGHT: 110.6 LBS | SYSTOLIC BLOOD PRESSURE: 139 MMHG | RESPIRATION RATE: 16 BRPM | TEMPERATURE: 97.3 F | DIASTOLIC BLOOD PRESSURE: 88 MMHG

## 2017-05-04 DIAGNOSIS — Z94.0 DECEASED-DONOR KIDNEY TRANSPLANT: Primary | ICD-10-CM

## 2017-05-04 DIAGNOSIS — Z48.298 AFTERCARE FOLLOWING ORGAN TRANSPLANT: ICD-10-CM

## 2017-05-04 DIAGNOSIS — Z94.0 KIDNEY REPLACED BY TRANSPLANT: ICD-10-CM

## 2017-05-04 DIAGNOSIS — E87.20 ACIDOSIS: ICD-10-CM

## 2017-05-04 DIAGNOSIS — Z94.0 KIDNEY REPLACED BY TRANSPLANT: Primary | ICD-10-CM

## 2017-05-04 DIAGNOSIS — D84.9 IMMUNOSUPPRESSED STATUS (H): ICD-10-CM

## 2017-05-04 LAB
ANION GAP SERPL CALCULATED.3IONS-SCNC: 12 MMOL/L (ref 3–14)
BASOPHILS # BLD AUTO: 0 10E9/L (ref 0–0.2)
BASOPHILS NFR BLD AUTO: 0.3 %
BUN SERPL-MCNC: 65 MG/DL (ref 7–30)
CALCIUM SERPL-MCNC: 9 MG/DL (ref 8.5–10.1)
CHLORIDE SERPL-SCNC: 113 MMOL/L (ref 94–109)
CO2 SERPL-SCNC: 18 MMOL/L (ref 20–32)
CREAT SERPL-MCNC: 4.87 MG/DL (ref 0.52–1.04)
DIFFERENTIAL METHOD BLD: ABNORMAL
EOSINOPHIL # BLD AUTO: 0.2 10E9/L (ref 0–0.7)
EOSINOPHIL NFR BLD AUTO: 2.5 %
ERYTHROCYTE [DISTWIDTH] IN BLOOD BY AUTOMATED COUNT: 15.5 % (ref 10–15)
GFR SERPL CREATININE-BSD FRML MDRD: 10 ML/MIN/1.7M2
GLUCOSE SERPL-MCNC: 88 MG/DL (ref 70–99)
HCT VFR BLD AUTO: 24.2 % (ref 35–47)
HGB BLD-MCNC: 8 G/DL (ref 11.7–15.7)
IMM GRANULOCYTES # BLD: 0 10E9/L (ref 0–0.4)
IMM GRANULOCYTES NFR BLD: 0.6 %
LYMPHOCYTES # BLD AUTO: 0.4 10E9/L (ref 0.8–5.3)
LYMPHOCYTES NFR BLD AUTO: 5.7 %
MAGNESIUM SERPL-MCNC: 1.6 MG/DL (ref 1.6–2.3)
MCH RBC QN AUTO: 29.2 PG (ref 26.5–33)
MCHC RBC AUTO-ENTMCNC: 33.1 G/DL (ref 31.5–36.5)
MCV RBC AUTO: 88 FL (ref 78–100)
MONOCYTES # BLD AUTO: 0.3 10E9/L (ref 0–1.3)
MONOCYTES NFR BLD AUTO: 4 %
NEUTROPHILS # BLD AUTO: 5.7 10E9/L (ref 1.6–8.3)
NEUTROPHILS NFR BLD AUTO: 86.9 %
NRBC # BLD AUTO: 0 10*3/UL
NRBC BLD AUTO-RTO: 0 /100
PHOSPHATE SERPL-MCNC: 4.6 MG/DL (ref 2.5–4.5)
PLATELET # BLD AUTO: 317 10E9/L (ref 150–450)
POTASSIUM SERPL-SCNC: 4.5 MMOL/L (ref 3.4–5.3)
RBC # BLD AUTO: 2.74 10E12/L (ref 3.8–5.2)
SODIUM SERPL-SCNC: 143 MMOL/L (ref 133–144)
TACROLIMUS BLD-MCNC: 7.2 UG/L (ref 5–15)
TME LAST DOSE: NORMAL H
WBC # BLD AUTO: 6.5 10E9/L (ref 4–11)

## 2017-05-04 PROCEDURE — 99213 OFFICE O/P EST LOW 20 MIN: CPT

## 2017-05-04 PROCEDURE — 80197 ASSAY OF TACROLIMUS: CPT | Performed by: INTERNAL MEDICINE

## 2017-05-04 PROCEDURE — 80180 DRUG SCRN QUAN MYCOPHENOLATE: CPT | Performed by: INTERNAL MEDICINE

## 2017-05-04 PROCEDURE — 36415 COLL VENOUS BLD VENIPUNCTURE: CPT

## 2017-05-04 PROCEDURE — 80048 BASIC METABOLIC PNL TOTAL CA: CPT | Performed by: INTERNAL MEDICINE

## 2017-05-04 PROCEDURE — 84100 ASSAY OF PHOSPHORUS: CPT | Performed by: INTERNAL MEDICINE

## 2017-05-04 PROCEDURE — 85025 COMPLETE CBC W/AUTO DIFF WBC: CPT | Performed by: INTERNAL MEDICINE

## 2017-05-04 PROCEDURE — 83735 ASSAY OF MAGNESIUM: CPT | Performed by: INTERNAL MEDICINE

## 2017-05-04 RX ORDER — SODIUM BICARBONATE 650 MG/1
650 TABLET ORAL 2 TIMES DAILY
Qty: 60 TABLET | Refills: 0 | Status: SHIPPED | OUTPATIENT
Start: 2017-05-04 | End: 2017-05-22

## 2017-05-04 ASSESSMENT — PAIN SCALES - GENERAL: PAINLEVEL: MILD PAIN (2)

## 2017-05-04 NOTE — PATIENT INSTRUCTIONS
Dear Rosibel Willingham    Thank you for choosing [facility] ([facility]) for your transplant cares.  The following information is a summary of our appointment as well as important reminders.      Please make sure your phone is available today because I will call to update you with your anti-rejection drug levels and possibly make changes to your anti-rejection dosages.    Additional information:     Start sodium bicarb 650 mg twice daily.   East small frequent meals and drink adequate fluids.  Return tomorrow for lab and assessment.    We look forward in seeing you on your next appointment here at [facility].  Please don t hesitate to call us at [phone] to reschedule any of your appointments or to speak with one of the [facility] registered nurses.  It was a pleasure taking care of you today.    Sincerely,  Nelida Sloan RN  [facility]  Specialty Infusion & Procedure Center  [address] [place] [address]  [address]  Phone:  [phone]

## 2017-05-04 NOTE — PROGRESS NOTES
Assessment and Plan:  1. DDKT - Patient has DGF requiring dialysis.  Etiology likely from tubular injury related to ischemia.  Consider biopsy if creatinine not improving.  Drain in place.  Continue prograf at 3mg bid.  Ultrasound with normal perfusion and no obstruction.  Negative UA for infection.  2. Hypertension - patient is hypovolemic on exam.  Continue amlodipine to 10 mg daily.    3. Hypovolemic - improved after 2 L yesterday.  Negative orthostatics today.  Monitor.  4. Anemia - stable hemoglobin. Monitor for aranesp needs (not on it previously)  Patient is iron replete.   5. Renal Secondary Hyperparathyroidism - mildly elevated PTH at 149.  Patient has mild vitamin d insufficiency.  Recheck PTH at 3 months.  6. Vitamin D insufficiency - at 29.  On cholecalciferol 1000 units daily  7. Prophylaxis - EBV D+R+, CMV D+R+.  Valcyte for 3 months.  8. Nausea - continue zofran for nausea  9. Hypomagnesemia - continue magnesium oxide 400 mg bid    Follow up with labs tomorrow.    Assessment and plan was discussed with patient and she voiced her understanding and agreement.    Reason for Visit:  Ms. Willingham is here for hospital follow up following kidney transplant.    HPI:   Rosibel Willingham is a 41 year old female with ESKD from unknown etiology and is status post DDKT on 4/23/17.         Transplant Hx:       Tx: DDKT  Date: 4/23/17       Present Maintenance IS: Tacrolimus and Mycophenolate mofetil       Baseline Creatinine: TBD       Recent DSA: No  PRA ():     Class I:      Class II:        Biopsy: No    Patient with about 1 L of fluid intake and three small meals.  The nausea is still affecting her.  She notes robust urine output ongoing.  Drain output remains at 100 ml/day.  She does note some pain around the pelvic bone but denies dysuria or hematuria.  UA negative for infection yesterday.  Renal ultrasound done yesterday wnl.  No edema.  No fevers, chills.  Denies parasthesia.  Her prograf levels are low are  improving.    Home BP: 150/90.      ROS:   A comprehensive review of systems was obtained and negative, except as noted in the HPI or PMH.    Active Medical Problems:  Patient Active Problem List   Diagnosis     End stage kidney disease (H)     Anemia of chronic kidney failure     Secondary hyperparathyroidism (H)     Hypertension     Kidney transplant candidate     -donor kidney transplant     Immunosuppression (H)     Delayed graft function of kidney     Aftercare following organ transplant       Personal Hx:  Social History     Social History     Marital status:      Spouse name: N/A     Number of children: N/A     Years of education: N/A     Occupational History     Not on file.     Social History Main Topics     Smoking status: Former Smoker     Packs/day: 0.50     Years: 2.00     Types: Cigarettes     Quit date: 1998     Smokeless tobacco: Never Used     Alcohol use 1.2 oz/week     2 Standard drinks or equivalent per week     Drug use: No     Sexual activity: Not on file     Other Topics Concern     Not on file     Social History Narrative       Allergies:  Allergies   Allergen Reactions     Erythromycin Hives       Medications:  Prior to Admission medications    Medication Sig Start Date End Date Taking? Authorizing Provider   tacrolimus (PROGRAF - GENERIC EQUIVALENT) 0.5 MG capsule Take 1 capsule (0.5 mg) by mouth 2 times daily 17   Benjamin Beltran MD   mycophenolate (CELLCEPT - GENERIC EQUIVALENT) 250 MG capsule Take 3 capsules (750 mg) by mouth 2 times daily 17   Gilberto, Tawny Alexandre PA-C   tacrolimus (PROGRAF - GENERIC EQUIVALENT) 1 MG capsule Take 2 capsules (2 mg) by mouth 2 times daily 17   Gilberto, Tawny Alexandre PA-C   amLODIPine (NORVASC) 5 MG tablet Take 1 tablet (5 mg) by mouth daily 17   Gilberto, Tawny Alexandre PA-C   sulfamethoxazole-trimethoprim (BACTRIM/SEPTRA) 400-80 MG per tablet Take 1 tablet by mouth Every Mon, Wed, Fri Morning 17   Tawny Macias  MARK Alexandre   valGANciclovir (VALCYTE) 450 MG tablet Take 1 tablet (450 mg) by mouth twice a week 4/27/17 7/26/17  Tawny Macias PA-C   aspirin  MG EC tablet Take 1 tablet (325 mg) by mouth daily 4/27/17 4/27/18  Tawny Macias PA-C   acetaminophen (TYLENOL) 325 MG tablet Take 2 tablets (650 mg) by mouth every 4 hours as needed for mild pain or fever 4/27/17   Gilberto, Tawny Alexandre PA-C   senna-docusate (SENOKOT-S;PERICOLACE) 8.6-50 MG per tablet Take 1-2 tablets by mouth daily as needed for constipation 4/27/17   Tawny Macias PA-C       Vitals:  Reviewed - orthostatic today    Exam:   GENERAL APPEARANCE: alert and no distress  HENT: mouth without ulcers or lesions  LYMPHATICS: no cervical or supraclavicular nodes  RESP: lungs clear to auscultation - no rales, rhonchi or wheezes  CV: regular rhythm, normal rate, no rub, no murmur  EDEMA: No LE edema bilaterally  ABDOMEN: soft, nondistended, nontender, bowel sounds normal  MS: extremities normal - no gross deformities noted, no evidence of inflammation in joints, no muscle tenderness  SKIN: no rash  TX KIDNEY: normal    Results:   Reviewed    Patient was seen and evaluated by me, Benjamin Beltran MD. I have reviewed the note and agree with the the plan of care as documented by the fellow.

## 2017-05-04 NOTE — MR AVS SNAPSHOT
After Visit Summary   2017    Rosibel Willingham    MRN: [id number]           Patient Information     Date Of Birth          1975        Visit Information        Provider Department      2017 7:00 AM UC 43 ATC; UC SPEC INFUSION [facility] Specialty and Procedure        Today's Diagnoses     -donor kidney transplant    -  1    Aftercare following organ transplant        Kidney replaced by transplant          Care Instructions    Dear Rosibel Willingham    Thank you for choosing [facility] ([facility]) for your transplant cares.  The following information is a summary of our appointment as well as important reminders.      Please make sure your phone is available today because I will call to update you with your anti-rejection drug levels and possibly make changes to your anti-rejection dosages.    Additional information:     Start sodium bicarb 650 mg twice daily.   East small frequent meals and drink adequate fluids.  Return tomorrow for lab and assessment.    We look forward in seeing you on your next appointment here at [facility].  Please don t hesitate to call us at [phone] to reschedule any of your appointments or to speak with one of the [facility] registered nurses.  It was a pleasure taking care of you today.    Sincerely,  Nelida Sloan, RN  [facility]  Specialty Infusion & Procedure Center  [address] [place] [address]  [address]  Phone:  [phone]          Follow-ups after your visit        Your next 10 appointments already scheduled     May 15, 2017  2:00 PM CDT   (Arrive by 1:45 PM)   Kidney Post Op with Leanne Montelongo MD   [facility] Solid Organ Transplant ([facility])    [address] [place] [id number] [phone]            May 22, 2017   Procedure with Leanne Montelongo MD   [facility] [place], Same Day Surgery  "(--)    [address] [facility] [address]   [phone]            May 22, 2017  1:00 PM CDT   (Arrive by 12:30 PM)   Return Kidney Transplant with Benjamin Beltran MD   [facility] Nephrology ([facility])    [address] [place] [id number] [phone]            Jul 24, 2017  1:10 PM CDT   (Arrive by 12:40 PM)   Return Kidney Transplant with Benjamin Beltran MD   [facility] Nephrology ([facility])    [address] [place] [id number] [phone]              Who to contact     If you have questions or need follow up information about today's clinic visit or your schedule please contact [facility] SPECIALTY AND PROCEDURE directly at [phone].  Normal or non-critical lab and imaging results will be communicated to you by [facility] [facility] [employer], letter or phone within 4 business days after the clinic has received the results. If you do not hear from us within 7 days, please contact the clinic through [facility] [employer] or phone. If you have a critical or abnormal lab result, we will notify you by phone as soon as possible.  Submit refill requests through [employer] or call your pharmacy and they will forward the refill request to us. Please allow 3 business days for your refill to be completed.          Additional Information About Your Visit        [employer] Information     [employer] lets you send messages to your doctor, view your test results, renew your prescriptions, schedule appointments and more. To sign up, go to [email or web address] . Click on \"Log in\" on the left side of the screen, which will take you to the Welcome page. Then click on \"Sign up Now\" on the right side of the page.     You will be asked to enter the access code listed below, as well as some personal information. Please follow the directions to create your username and password.     Your access code is: [email or web address]  Expires: 7/24/2017  2:16 " PM     Your access code will  in 90 days. If you need help or a new code, please call your [facility] clinic or [phone].        Care EveryWhere ID     This is your Care EveryWhere ID. This could be used by other organizations to access your [facility] medical records  [id number]        Your Vitals Were     Pulse Temperature Respirations BMI (Body Mass Index)          89 97.3  F (36.3  C) (Oral) 16 20.23 kg/m2         Blood Pressure from Last 3 Encounters:   17 (P) 145/83   17 (!) 170/97   17 162/90    Weight from Last 3 Encounters:   17 50.2 kg (110 lb 9.6 oz)   17 50 kg (110 lb 4.8 oz)   17 50.6 kg (111 lb 8 oz)              We Performed the Following     Basic metabolic panel     CBC with platelets differential     Magnesium     Mycophenolic acid     Phosphorus     Tacrolimus level          Today's Medication Changes          These changes are accurate as of: 17  9:44 AM.  If you have any questions, ask your nurse or doctor.               Start taking these medicines.        Dose/Directions    sodium bicarbonate 650 MG tablet   Used for:  Kidney replaced by transplant, Acidosis   Started by:  Benjamin Beltran MD        Dose:  650 mg   Take 1 tablet (650 mg) by mouth 2 times daily   Quantity:  60 tablet   Refills:  0         These medicines have changed or have updated prescriptions.        Dose/Directions    * tacrolimus 1 MG capsule   Commonly known as:  PROGRAF - GENERIC EQUIVALENT   This may have changed:  how much to take   Used for:  Kidney replaced by transplant, Immunosuppression (H)        Dose:  2 mg   Take 2 capsules (2 mg) by mouth 2 times daily   Quantity:  120 capsule   Refills:  11       * tacrolimus 0.5 MG capsule   Commonly known as:  PROGRAF - GENERIC EQUIVALENT   This may have changed:  Another medication with the same name was changed. Make sure you understand how and when to take each.   Used for:  Kidney replaced by transplant        Dose:  0.5  mg   Take 1 capsule (0.5 mg) by mouth 2 times daily   Quantity:  60 capsule   Refills:  11       * Notice:  This list has 2 medication(s) that are the same as other medications prescribed for you. Read the directions carefully, and ask your doctor or other care provider to review them with you.         Where to get your medicines      These medications were sent to [facility] [place], [place] - [address] [facility] [address]  [address], [facility] [address]    Hours:  TRANSPLANT PHONE NUMBER [phone] Phone:  [phone]     sodium bicarbonate 650 MG tablet                Primary Care Provider    Physician No Ref-Primary       No address on file        Thank you!     Thank you for choosing [facility] SPECIALTY AND PROCEDURE  for your care. Our goal is always to provide you with excellent care. Hearing back from our patients is one way we can continue to improve our services. Please take a few minutes to complete the written survey that you may receive in the mail after your visit with us. Thank you!             Your Updated Medication List - Protect others around you: Learn how to safely use, store and throw away your medicines at www.disposemymeds.org.          This list is accurate as of: 5/4/17  9:44 AM.  Always use your most recent med list.                   Brand Name Dispense Instructions for use    acetaminophen 325 MG tablet    TYLENOL    100 tablet    Take 2 tablets (650 mg) by mouth every 4 hours as needed for mild pain or fever       amLODIPine 5 MG tablet    NORVASC    30 tablet    Take 2 tablets (10 mg) by mouth daily       aspirin 325 MG EC tablet     30 tablet    Take 1 tablet (325 mg) by mouth daily       cholecalciferol 1000 UNITS capsule    vitamin  -D    30 capsule    Take 1 capsule (1,000 Units) by mouth daily       magnesium oxide 400 MG tablet    MAG-OX    120 tablet    Take 1 tablet (400 mg) by mouth 2 times daily        mycophenolate 250 MG capsule    CELLCEPT - GENERIC EQUIVALENT    180 capsule    Take 3 capsules (750 mg) by mouth 2 times daily       ondansetron 4 MG tablet    ZOFRAN    30 tablet    Take 1 tablet (4 mg) by mouth every 8 hours as needed for nausea       senna-docusate 8.6-50 MG per tablet    SENOKOT-S;PERICOLACE    100 tablet    Take 1-2 tablets by mouth daily as needed for constipation       sodium bicarbonate 650 MG tablet     60 tablet    Take 1 tablet (650 mg) by mouth 2 times daily       sulfamethoxazole-trimethoprim 400-80 MG per tablet    BACTRIM/SEPTRA    12 tablet    Take 1 tablet by mouth Every Mon, Wed, Fri Morning       * tacrolimus 1 MG capsule    PROGRAF - GENERIC EQUIVALENT    120 capsule    Take 2 capsules (2 mg) by mouth 2 times daily       * tacrolimus 0.5 MG capsule    PROGRAF - GENERIC EQUIVALENT    60 capsule    Take 1 capsule (0.5 mg) by mouth 2 times daily       valGANciclovir 450 MG tablet    VALCYTE    8 tablet    Take 1 tablet (450 mg) by mouth twice a week       * Notice:  This list has 2 medication(s) that are the same as other medications prescribed for you. Read the directions carefully, and ask your doctor or other care provider to review them with you.

## 2017-05-04 NOTE — MR AVS SNAPSHOT
After Visit Summary   5/4/2017    Rosibel Willingham    MRN: [id number]           Patient Information     Date Of Birth          1975        Visit Information        Provider Department      5/4/2017 8:00 AM Benjamin Beltran MD [facility] Specialty and Procedure        Today's Diagnoses     Kidney replaced by transplant    -  1    Acidosis        Immunosuppressed status (H)        Aftercare following organ transplant           Follow-ups after your visit        Your next 10 appointments already scheduled     May 15, 2017  2:00 PM CDT   (Arrive by 1:45 PM)   Kidney Post Op with Leanne Montelongo MD   [facility] Solid Organ Transplant ([facility])    [address] [place] [id number] [phone]            May 22, 2017   Procedure with Leanne Montelongo MD   [facility], Same Day Surgery (--)    [address] [facility] [address] [phone]            May 22, 2017  1:00 PM CDT   (Arrive by 12:30 PM)   Return Kidney Transplant with Benjamin Beltran MD   [facility] Nephrology ([facility])    [address] [place] [id number] [phone]            Jul 24, 2017  1:10 PM CDT   (Arrive by 12:40 PM)   Return Kidney Transplant with Benjamin Beltran MD   [facility] Nephrology ([facility])    [address] [place] [id number] [phone]              Who to contact     If you have questions or need follow up information about today's clinic visit or your schedule please contact [facility] SPECIALTY AND PROCEDURE directly at [phone].  Normal or non-critical lab and imaging results will be communicated to you by MyChart, letter or phone within 4 business days after the clinic has received the results. If you do not hear from us within 7 days, please contact the clinic through MyChart or phone. If you have  "a critical or abnormal lab result, we will notify you by phone as soon as possible.  Submit refill requests through [employer] or call your pharmacy and they will forward the refill request to us. Please allow 3 business days for your refill to be completed.          Additional Information About Your Visit        [employer] Information     [employer] lets you send messages to your doctor, view your test results, renew your prescriptions, schedule appointments and more. To sign up, go to [email or web address] . Click on \"Log in\" on the left side of the screen, which will take you to the Welcome page. Then click on \"Sign up Now\" on the right side of the page.     You will be asked to enter the access code listed below, as well as some personal information. Please follow the directions to create your username and password.     Your access code is: [email or web address]  Expires: 2017  2:16 PM     Your access code will  in 90 days. If you need help or a new code, please call your [facility] clinic or [phone].        Care EveryWhere ID     This is your Care EveryWhere ID. This could be used by other organizations to access your [facility] medical records  [id number]         Blood Pressure from Last 3 Encounters:   17 113/73   17 128/75   17 (P) 145/83    Weight from Last 3 Encounters:   17 48.7 kg (107 lb 4.8 oz)   17 49.1 kg (108 lb 3.2 oz)   17 50.2 kg (110 lb 9.6 oz)              Today, you had the following     No orders found for display         Today's Medication Changes          These changes are accurate as of: 17 11:59 PM.  If you have any questions, ask your nurse or doctor.               Start taking these medicines.        Dose/Directions    sodium bicarbonate 650 MG tablet   Used for:  Kidney replaced by transplant, Acidosis   Started by:  Benjamin Beltran MD        Dose:  650 mg   Take 1 tablet (650 mg) by mouth 2 times daily   Quantity:  60 tablet   Refills:  0       "      Where to get your medicines      These medications were sent to [place], [place] - [address]  [address], [facility] [address]    Hours:  TRANSPLANT PHONE NUMBER [phone] Phone:  [phone]     sodium bicarbonate 650 MG tablet                Primary Care Provider    Physician No Ref-Primary       No address on file        Thank you!     Thank you for choosing [facility] SPECIALTY AND PROCEDURE  for your care. Our goal is always to provide you with excellent care. Hearing back from our patients is one way we can continue to improve our services. Please take a few minutes to complete the written survey that you may receive in the mail after your visit with us. Thank you!             Your Updated Medication List - Protect others around you: Learn how to safely use, store and throw away your medicines at www.disposemymeds.org.          This list is accurate as of: 5/4/17 11:59 PM.  Always use your most recent med list.                   Brand Name Dispense Instructions for use    acetaminophen 325 MG tablet    TYLENOL    100 tablet    Take 2 tablets (650 mg) by mouth every 4 hours as needed for mild pain or fever       amLODIPine 5 MG tablet    NORVASC    30 tablet    Take 2 tablets (10 mg) by mouth daily       aspirin 325 MG EC tablet     30 tablet    Take 1 tablet (325 mg) by mouth daily       cholecalciferol 1000 UNITS capsule    vitamin  -D    30 capsule    Take 1 capsule (1,000 Units) by mouth daily       magnesium oxide 400 MG tablet    MAG-OX    120 tablet    Take 1 tablet (400 mg) by mouth 2 times daily       mycophenolate 250 MG capsule    CELLCEPT - GENERIC EQUIVALENT    180 capsule    Take 3 capsules (750 mg) by mouth 2 times daily       ondansetron 4 MG tablet    ZOFRAN    30 tablet    Take 1 tablet (4 mg) by mouth every 8 hours as needed for nausea       senna-docusate 8.6-50 MG per tablet     SENOKOT-S;PERICOLACE    100 tablet    Take 1-2 tablets by mouth daily as needed for constipation       sodium bicarbonate 650 MG tablet     60 tablet    Take 1 tablet (650 mg) by mouth 2 times daily       sulfamethoxazole-trimethoprim 400-80 MG per tablet    BACTRIM/SEPTRA    12 tablet    Take 1 tablet by mouth Every Mon, Wed, Fri Morning       tacrolimus 0.5 MG capsule    PROGRAF - GENERIC EQUIVALENT    60 capsule    Take 1 capsule (0.5 mg) by mouth 2 times daily       valGANciclovir 450 MG tablet    VALCYTE    8 tablet    Take 1 tablet (450 mg) by mouth twice a week

## 2017-05-04 NOTE — PROGRESS NOTES
"Rosibel Willingham came to [facility] today for a lab and assess following a kidney transplant on 4/23/2017.      Discharge date: 4/27/2017   Transplant coordinator: Chinyere Burnham RN  Phone number patient can be reached at: Rosibel cell: [phone]  Please call  Ian frank as well with Prograf dose: [phone]      Physical Assessment:  See physical assessment located under \"Document Flowsheets\".  Incision site: Secured with dermabond. Edges well approximated. No exudate or erythema  Lines: OCTAVIO consistently draining approximately 100 ml/ 24 hrs. Surgery notified of output.  Huang: N/A  Urine clarity: clear yellow urine, UA yesterday- reviewed with MD, no new orders.  Hydration: increased fluid intake encouraged, patient estimated about 2 L of fluid in last 24 hours  Nutrition: minimal appetite. Having considerable amount of nausea, worst in AM. Taking Zofran as ordered. Reports this take about 1 hour to improve nausea.   Last BM: patient reports many loose/ diarrhea stools each day  Pain: improved to 2/10 at incision site. Well controlled with Tylenol PRN.     Laboratory tests: Standard labs drawn.    Plan of care for today: labs and assessment, rounding by Nephrology service. Updated home care RN of plan of care.     Medication changes: sodium bicarb 650 mg BID    Medications administered: N/A      Patient education:    The following teaching topics were addressed: Importance of drinking 2L of non-caffeinated fluids daily, Incisional care, Signs/symptoms of infection, Good handwashing, Medications (purposes, doses and times of administration), Phone numbers to call with concenrs (Transplant coordinator, [facility] [address] and [facility]), Plan of care and Drain care   Patient, significant other verbalized understanding and all questions answered.      Last Tac dose 7:30 PM last evening.  TAC level reviewed with Dr. Cohen who gave orders to instruct pt to continue her current dose of Tacrolimus, no changes made today. Pt's "  notified and verbalized understanding.     Face to face time: 10 minutes    Discharge Plan    Pt will follow up with LBA tomorrow.  Discharge instructions reviewed with patient: YES  Patient/Representative verbalized understanding, all questions answered: YES    Discharged from unit at 0945 with whom:  to hotel.    Nelida Sloan RN

## 2017-05-05 ENCOUNTER — HOSPITAL ENCOUNTER (OUTPATIENT)
Facility: CLINIC | Age: 42
Setting detail: SPECIMEN
Discharge: HOME | End: 2017-05-05
Admitting: INTERNAL MEDICINE
Payer: COMMERCIAL

## 2017-05-05 ENCOUNTER — OFFICE VISIT (OUTPATIENT)
Dept: INFUSION THERAPY | Facility: CLINIC | Age: 42
End: 2017-05-05
Attending: INTERNAL MEDICINE
Payer: COMMERCIAL

## 2017-05-05 ENCOUNTER — RECORDS - HEALTHEAST (OUTPATIENT)
Dept: ADMINISTRATIVE | Facility: OTHER | Age: 42
End: 2017-05-05

## 2017-05-05 VITALS
OXYGEN SATURATION: 100 % | RESPIRATION RATE: 16 BRPM | HEART RATE: 89 BPM | SYSTOLIC BLOOD PRESSURE: 128 MMHG | DIASTOLIC BLOOD PRESSURE: 75 MMHG | BODY MASS INDEX: 19.79 KG/M2 | TEMPERATURE: 98.3 F | WEIGHT: 108.2 LBS

## 2017-05-05 DIAGNOSIS — N18.9 ANEMIA IN CHRONIC RENAL DISEASE: ICD-10-CM

## 2017-05-05 DIAGNOSIS — E87.20 METABOLIC ACIDOSIS: ICD-10-CM

## 2017-05-05 DIAGNOSIS — D84.9 IMMUNOSUPPRESSED STATUS (H): ICD-10-CM

## 2017-05-05 DIAGNOSIS — I15.1 HYPERTENSION SECONDARY TO OTHER RENAL DISORDERS: ICD-10-CM

## 2017-05-05 DIAGNOSIS — Z94.0 KIDNEY REPLACED BY TRANSPLANT: Primary | ICD-10-CM

## 2017-05-05 DIAGNOSIS — R19.7 DIARRHEA, UNSPECIFIED TYPE: ICD-10-CM

## 2017-05-05 DIAGNOSIS — N25.81 SECONDARY RENAL HYPERPARATHYROIDISM (H): ICD-10-CM

## 2017-05-05 DIAGNOSIS — Z48.298 AFTERCARE FOLLOWING ORGAN TRANSPLANT: ICD-10-CM

## 2017-05-05 DIAGNOSIS — D84.9 IMMUNOSUPPRESSION (H): ICD-10-CM

## 2017-05-05 DIAGNOSIS — D63.1 ANEMIA IN CHRONIC RENAL DISEASE: ICD-10-CM

## 2017-05-05 DIAGNOSIS — E83.42 HYPOMAGNESEMIA: ICD-10-CM

## 2017-05-05 DIAGNOSIS — Z94.0 KIDNEY REPLACED BY TRANSPLANT: ICD-10-CM

## 2017-05-05 DIAGNOSIS — B37.0 CANDIDIASIS OF MOUTH: ICD-10-CM

## 2017-05-05 DIAGNOSIS — E55.9 VITAMIN D DEFICIENCY: ICD-10-CM

## 2017-05-05 LAB
ANION GAP SERPL CALCULATED.3IONS-SCNC: 9 MMOL/L (ref 3–14)
BASOPHILS # BLD AUTO: 0 10E9/L (ref 0–0.2)
BASOPHILS NFR BLD AUTO: 0.5 %
BUN SERPL-MCNC: 61 MG/DL (ref 7–30)
C DIFF TOX B STL QL: NORMAL
CALCIUM SERPL-MCNC: 9.5 MG/DL (ref 8.5–10.1)
CAMPYLOBACTER GROUP BY NAT: NOT DETECTED
CHLORIDE SERPL-SCNC: 113 MMOL/L (ref 94–109)
CO2 SERPL-SCNC: 19 MMOL/L (ref 20–32)
CREAT SERPL-MCNC: 4.6 MG/DL (ref 0.52–1.04)
DIFFERENTIAL METHOD BLD: ABNORMAL
ENTERIC PATHOGEN COMMENT: NORMAL
EOSINOPHIL # BLD AUTO: 0.2 10E9/L (ref 0–0.7)
EOSINOPHIL NFR BLD AUTO: 2.1 %
ERYTHROCYTE [DISTWIDTH] IN BLOOD BY AUTOMATED COUNT: 15.4 % (ref 10–15)
GFR SERPL CREATININE-BSD FRML MDRD: 10 ML/MIN/1.7M2
GLUCOSE SERPL-MCNC: 92 MG/DL (ref 70–99)
HCT VFR BLD AUTO: 26.5 % (ref 35–47)
HGB BLD-MCNC: 8.5 G/DL (ref 11.7–15.7)
IMM GRANULOCYTES # BLD: 0 10E9/L (ref 0–0.4)
IMM GRANULOCYTES NFR BLD: 0.2 %
LYMPHOCYTES # BLD AUTO: 0.4 10E9/L (ref 0.8–5.3)
LYMPHOCYTES NFR BLD AUTO: 5.1 %
MAGNESIUM SERPL-MCNC: 1.6 MG/DL (ref 1.6–2.3)
MCH RBC QN AUTO: 28.5 PG (ref 26.5–33)
MCHC RBC AUTO-ENTMCNC: 32.1 G/DL (ref 31.5–36.5)
MCV RBC AUTO: 89 FL (ref 78–100)
MONOCYTES # BLD AUTO: 0.4 10E9/L (ref 0–1.3)
MONOCYTES NFR BLD AUTO: 4.3 %
MYCOPHENOLATE SERPL LC/MS/MS-MCNC: 1.3 MG/L (ref 1–3.5)
MYCOPHENOLATE-G SERPL LC/MS/MS-MCNC: 183.7 MG/L (ref 30–95)
NEUTROPHILS # BLD AUTO: 7.1 10E9/L (ref 1.6–8.3)
NEUTROPHILS NFR BLD AUTO: 87.8 %
NOROVIRUS I AND II BY NAT: NOT DETECTED
NRBC # BLD AUTO: 0 10*3/UL
NRBC BLD AUTO-RTO: 0 /100
PHOSPHATE SERPL-MCNC: 4 MG/DL (ref 2.5–4.5)
PLATELET # BLD AUTO: 366 10E9/L (ref 150–450)
POTASSIUM SERPL-SCNC: 5 MMOL/L (ref 3.4–5.3)
RBC # BLD AUTO: 2.98 10E12/L (ref 3.8–5.2)
ROTAVIRUS A BY NAT: NOT DETECTED
SALMONELLA SPECIES BY NAT: NOT DETECTED
SHIGA TOXIN 1 GENE BY NAT: NOT DETECTED
SHIGA TOXIN 2 GENE BY NAT: NOT DETECTED
SHIGELLA SP+EIEC IPAH STL QL NAA+PROBE: NOT DETECTED
SODIUM SERPL-SCNC: 141 MMOL/L (ref 133–144)
SPECIMEN SOURCE: NORMAL
TACROLIMUS BLD-MCNC: 7.9 UG/L (ref 5–15)
TME LAST DOSE: ABNORMAL H
TME LAST DOSE: NORMAL H
VIBRIO GROUP BY NAT: NOT DETECTED
WBC # BLD AUTO: 8.4 10E9/L (ref 4–11)
YERSINIA ENTEROCOLITICA BY NAT: NOT DETECTED

## 2017-05-05 PROCEDURE — 99214 OFFICE O/P EST MOD 30 MIN: CPT | Mod: 25

## 2017-05-05 PROCEDURE — 25000128 H RX IP 250 OP 636: Mod: ZF | Performed by: INTERNAL MEDICINE

## 2017-05-05 PROCEDURE — 80197 ASSAY OF TACROLIMUS: CPT | Performed by: INTERNAL MEDICINE

## 2017-05-05 PROCEDURE — 96360 HYDRATION IV INFUSION INIT: CPT

## 2017-05-05 PROCEDURE — 99215 OFFICE O/P EST HI 40 MIN: CPT | Mod: ZF

## 2017-05-05 RX ORDER — MYCOPHENOLIC ACID 180 MG/1
540 TABLET, DELAYED RELEASE ORAL 2 TIMES DAILY
Qty: 240 TABLET | Refills: 11 | Status: SHIPPED | OUTPATIENT
Start: 2017-05-05 | End: 2017-05-17

## 2017-05-05 RX ORDER — CLOTRIMAZOLE 10 MG/1
1 LOZENGE ORAL
Qty: 70 TROCHE | Refills: 0 | Status: SHIPPED | OUTPATIENT
Start: 2017-05-05 | End: 2017-05-08

## 2017-05-05 RX ORDER — TACROLIMUS 1 MG/1
3 CAPSULE ORAL 2 TIMES DAILY
COMMUNITY
Start: 2017-05-05 | End: 2017-05-07

## 2017-05-05 RX ADMIN — SODIUM CHLORIDE 1000 ML: 9 INJECTION, SOLUTION INTRAVENOUS at 08:35

## 2017-05-05 NOTE — PROGRESS NOTES
Assessment and Plan:  1. DDKT - Patient had DGF but now with improving kidney function.  Etiology of DGF likely from tubular injury related to ischemia.  Consider biopsy if creatinine not improving.  Drain in place with improving output.  Continue prograf at 3mg bid.  Change Immunosuppression from mycophenolate to myfortic. 1 L IV fluids today for ongoing volume depletion.  2. Hypertension - Blood pressure is at goal less than 140/90.  Continue amlodipine to 10 mg daily.    3. Hypovolemic - Worse today with weight down to 108 lbs (down from 110).  1 L IV fluids today.  4. Anemia - stable hemoglobin. Monitor for aranesp needs (not on it previously)  Patient is iron replete.   5. Renal Secondary Hyperparathyroidism - mildly elevated PTH at 149.  Patient has mild vitamin d insufficiency.  Recheck PTH at 3 months.  6. Vitamin D insufficiency - at 29.  On cholecalciferol 1000 units daily  7. Prophylaxis - EBV D+R+, CMV D+R+.  Valcyte for 3 months.  8. Nausea - asked patient to schedule zofran q6 hours over the weekend  9. Hypomagnesemia - continue magnesium oxide 400 mg bid  10. Diarrhea - check stool for Cdiff and norovirus.  Change  11.    Follow up with labs tomorrow.    Assessment and plan was discussed with patient and she voiced her understanding and agreement.    Attestation:  This patient has been seen and evaluated by me, Sid Blankenship MD.  I have reviewed the note and agree with plan of care as documented by the fellow.       Reason for Visit:  Ms. Willingham is here for hospital follow up following kidney transplant.    HPI:   Rosibel Willingham is a 41 year old female with ESKD from unknown etiology and is status post DDKT on 4/23/17.         Transplant Hx:       Tx: DDKT  Date: 4/23/17       Present Maintenance IS: Tacrolimus and Mycophenolate mofetil       Baseline Creatinine: TBD       Recent DSA: No  PRA ():     Class I:      Class II:        Biopsy: No    Patient with about 1 L of fluid intake and three  small meals.  The nausea is still affecting her.  She notes robust urine output ongoing.  Drain output remains at 100 ml/day.  She does note some pain around the pelvic bone but denies dysuria or hematuria.  UA negative for infection yesterday.  Renal ultrasound done yesterday wnl.  No edema.  No fevers, chills.  Denies parasthesia.  Her prograf levels are low are improving.    Home BP: 150/90.      ROS:   A comprehensive review of systems was obtained and negative, except as noted in the HPI or PMH.    Active Medical Problems:  Patient Active Problem List   Diagnosis     End stage kidney disease (H)     Anemia of chronic kidney failure     Secondary hyperparathyroidism (H)     Hypertension     Kidney transplant candidate     -donor kidney transplant     Immunosuppression (H)     Delayed graft function of kidney     Aftercare following organ transplant       Personal Hx:  Social History     Social History     Marital status:      Spouse name: N/A     Number of children: N/A     Years of education: N/A     Occupational History     Not on file.     Social History Main Topics     Smoking status: Former Smoker     Packs/day: 0.50     Years: 2.00     Types: Cigarettes     Quit date: 1998     Smokeless tobacco: Never Used     Alcohol use 1.2 oz/week     2 Standard drinks or equivalent per week     Drug use: No     Sexual activity: Not on file     Other Topics Concern     Not on file     Social History Narrative       Allergies:  Allergies   Allergen Reactions     Erythromycin Hives       Medications:  Prior to Admission medications    Medication Sig Start Date End Date Taking? Authorizing Provider   tacrolimus (PROGRAF - GENERIC EQUIVALENT) 0.5 MG capsule Take 1 capsule (0.5 mg) by mouth 2 times daily 17   Benjamin Beltran MD   mycophenolate (CELLCEPT - GENERIC EQUIVALENT) 250 MG capsule Take 3 capsules (750 mg) by mouth 2 times daily 17   Tawny Macias PA-C   tacrolimus (PROGRAF -  GENERIC EQUIVALENT) 1 MG capsule Take 2 capsules (2 mg) by mouth 2 times daily 4/27/17   Fast, Tawny Alexandre PA-C   amLODIPine (NORVASC) 5 MG tablet Take 1 tablet (5 mg) by mouth daily 4/27/17   Fast, Tawny Alexandre PA-C   sulfamethoxazole-trimethoprim (BACTRIM/SEPTRA) 400-80 MG per tablet Take 1 tablet by mouth Every Mon, Wed, Fri Morning 4/28/17   Fast, Tawny Alexandre PA-C   valGANciclovir (VALCYTE) 450 MG tablet Take 1 tablet (450 mg) by mouth twice a week 4/27/17 7/26/17  Fast, Tawny Alexandre PA-C   aspirin  MG EC tablet Take 1 tablet (325 mg) by mouth daily 4/27/17 4/27/18  Fast, Tawny Alexandre PA-C   acetaminophen (TYLENOL) 325 MG tablet Take 2 tablets (650 mg) by mouth every 4 hours as needed for mild pain or fever 4/27/17   Fast, Tawny Alexandre PA-C   senna-docusate (SENOKOT-S;PERICOLACE) 8.6-50 MG per tablet Take 1-2 tablets by mouth daily as needed for constipation 4/27/17   Fast, Tawny Alexandre PA-C       Vitals:  128/75 -> 117/73  89 -> 93  SpO2 100    Exam:   GENERAL APPEARANCE: alert and no distress  HENT: whitish tongue  LYMPHATICS: no cervical or supraclavicular nodes  RESP: lungs clear to auscultation - no rales, rhonchi or wheezes  CV: regular rhythm, normal rate, no rub, no murmur  EDEMA: No LE edema bilaterally  ABDOMEN: soft, nondistended, nontender, bowel sounds normal  MS: extremities normal - no gross deformities noted, no evidence of inflammation in joints, no muscle tenderness  SKIN: no rash  TX KIDNEY: normal    Results:   Reviewed

## 2017-05-05 NOTE — PROGRESS NOTES
"Rosibel Willingham came to [facility] today for a lab and assess following a kidney transplant on 4/23/2017.      Discharge date: 4/27/2017   Transplant coordinator: Chinyere Burnham RN  Phone number patient can be reached at: Rosibel cell: [phone]  Please call  Ian frank as well with Prograf dose: [phone]      Physical Assessment:  See physical assessment located under \"Document Flowsheets\".  Incision site: Secured with dermabond. Edges well approximated. No exudate or erythema  Lines: OCTAVIO output estimated at 80 cc/ last 24 hours  Huang: N/A  Urine clarity: clear yellow urine  Hydration: increased fluid intake encouraged, patient estimated about 2 L of fluid in last 24 hours  Nutrition: minimal appetite. Continues to have nausea, taking Zofran.   Last BM: consistent loose stools, approximately 5/d.  Pain: improved to 2/10 at incision site. Patient no longer taking Tylenol.    Laboratory tests: Standard labs drawn.    Plan of care for today: labs and assessment reviewed with MD. IV fluids given. Patient seen by coordinator and nephrology service today. Medication changes made as outlined below. C diff sample today due to frequent loose BM's. Patient to push fluids/ food.    Medication changes:   1) Stop Cellcept  2) Start mycophenolic acid 3 tablets (540 mg) twice daily (8AM/ 8PM).   3) Start clotrimazole roya 5 times daily.   4) Start Zofran 4 mg Q 4 hours.     Medications administered: N/A  1 L NS given for dehydration.    Patient education:  The following teaching topics were addressed: Importance of drinking 2L of non-caffeinated fluids daily, Incisional care, Signs/symptoms of infection, Good handwashing, Medications (purposes, doses and times of administration), Phone numbers to call with concenrs (Transplant coordinator, [facility] and [facility]), Plan of care and Drain care   Patient, significant other verbalized understanding and all questions answered.    Last tacrolimus dose 1930 5/4. Level was 7.9 " today off a 12 hour trough. Reviewed with Dr. Blankenship who gave orders to increase dose to 3.5 mg BID. Patient and  updated via voice message.     Face to face time:  15 minutes    Discharge Plan    Pt will follow up with [facility] on Sunday 5/7, patient can cancel if eating/ drinking appropriately.   Discharge instructions reviewed with patient: yes  Patient/Representative verbalized understanding, all questions answered: yes    Discharged from unit at 1035 with whom:  to hotel.  Nelida Sloan RN

## 2017-05-05 NOTE — PROGRESS NOTES
Patient's prograf came back at 7.9 today. Discussed level with Dr. Blankenship who increased dose to 3.5 mg BID. Patient called to update and verbalized understanding.     Patient also seen by her coordinator today.     Leah Mcgee RN

## 2017-05-05 NOTE — MR AVS SNAPSHOT
After Visit Summary   5/5/2017    Rosibel Willingham    MRN: [id number]           Patient Information     Date Of Birth          1975        Visit Information        Provider Department      5/5/2017 7:00 AM UC 41 ATC; UC SPEC INFUSION [facility] Specialty and Procedure        Today's Diagnoses     Kidney replaced by transplant    -  1       Follow-ups after your visit        Your next 10 appointments already scheduled     May 07, 2017  7:00 AM CDT   (Arrive by 6:45 AM)   New Transplant Visit with UC SPEC INFUSION, UC 41 ATC   [facility] Specialty and Procedure ([facility])    [address] [place] [address]  2nd [facility] [id number] [phone]            May 15, 2017  2:00 PM CDT   (Arrive by 1:45 PM)   Kidney Post Op with Leanne Montelongo MD   [facility] Solid Organ Transplant ([facility])    [address] [place] [id number] [phone]            May 22, 2017   Procedure with Leanne Montelongo MD   [facility], [place], Same Day Surgery (--)    [address] [facility] [address]   [phone]            May 22, 2017  1:00 PM CDT   (Arrive by 12:30 PM)   Return Kidney Transplant with Benjamin Beltran MD   [facility] Nephrology ([facility])    [address] [place] [id number] [phone]            Jul 24, 2017  1:10 PM CDT   (Arrive by 12:40 PM)   Return Kidney Transplant with Benjamin Beltran MD   [facility] Nephrology ([facility])    [address] [place] [id number] [phone]              Who to contact     If you have questions or need follow up information about today's clinic visit or your schedule please contact [facility] SPECIALTY AND PROCEDURE directly at [phone].  Normal or non-critical lab and imaging results will be communicated to  "you by MyChart, letter or phone within 4 business days after the clinic has received the results. If you do not hear from us within 7 days, please contact the clinic through Cardiostrongt or phone. If you have a critical or abnormal lab result, we will notify you by phone as soon as possible.  Submit refill requests through Yeexoo or call your pharmacy and they will forward the refill request to us. Please allow 3 business days for your refill to be completed.          Additional Information About Your Visit        EnsendaharLoyalize Information     Yeexoo lets you send messages to your doctor, view your test results, renew your prescriptions, schedule appointments and more. To sign up, go to [email or web address] . Click on \"Log in\" on the left side of the screen, which will take you to the Welcome page. Then click on \"Sign up Now\" on the right side of the page.     You will be asked to enter the access code listed below, as well as some personal information. Please follow the directions to create your username and password.     Your access code is: [email or web address]  Expires: 2017  2:16 PM     Your access code will  in 90 days. If you need help or a new code, please call your [place] clinic or [phone].        Care EveryWhere ID     This is your Care EveryWhere ID. This could be used by other organizations to access your [place] medical records  [id number]        Your Vitals Were     Pulse Temperature Respirations Pulse Oximetry BMI (Body Mass Index)       89 98.3  F (36.8  C) (Tympanic) 16 100% 19.79 kg/m2        Blood Pressure from Last 3 Encounters:   17 128/75   17 (P) 145/83   17 (!) 170/97    Weight from Last 3 Encounters:   17 49.1 kg (108 lb 3.2 oz)   17 50.2 kg (110 lb 9.6 oz)   17 50 kg (110 lb 4.8 oz)              Today, you had the following     No orders found for display         Today's Medication Changes          These changes are accurate as of: 17 11:34 AM. "  If you have any questions, ask your nurse or doctor.               Start taking these medicines.        Dose/Directions    clotrimazole 10 MG edinson   Used for:  Kidney replaced by transplant, Candidiasis of mouth   Started by:  Sid Blankenship MD        Dose:  1 Edinson   Place 1 Edinson (10 mg) inside cheek 5 times daily   Quantity:  70 Edinson   Refills:  0       mycophenolic acid 180 MG EC tablet   Commonly known as:  MYFORTIC - GENERIC EQUIVALENT   Used for:  Kidney replaced by transplant   Started by:  Sid Blankenship MD        Dose:  540 mg   Take 3 tablets (540 mg) by mouth 2 times daily   Quantity:  240 tablet   Refills:  11         These medicines have changed or have updated prescriptions.        Dose/Directions    * tacrolimus 1 MG capsule   Commonly known as:  PROGRAF - GENERIC EQUIVALENT   This may have changed:  how much to take   Used for:  Kidney replaced by transplant, Immunosuppression (H)        Dose:  2 mg   Take 2 capsules (2 mg) by mouth 2 times daily   Quantity:  120 capsule   Refills:  11       * tacrolimus 0.5 MG capsule   Commonly known as:  PROGRAF - GENERIC EQUIVALENT   This may have changed:  Another medication with the same name was changed. Make sure you understand how and when to take each.   Used for:  Kidney replaced by transplant        Dose:  0.5 mg   Take 1 capsule (0.5 mg) by mouth 2 times daily   Quantity:  60 capsule   Refills:  11       * Notice:  This list has 2 medication(s) that are the same as other medications prescribed for you. Read the directions carefully, and ask your doctor or other care provider to review them with you.      Stop taking these medicines if you haven't already. Please contact your care team if you have questions.     mycophenolate 250 MG capsule   Commonly known as:  CELLCEPT - GENERIC EQUIVALENT   Stopped by:  Sid Blankenship MD                Where to get your medicines      These medications were sent to [facility]  [facility] - [place], [place] - [address]  [address] [facility] [address], [facility] [address]    Hours:  TRANSPLANT PHONE NUMBER [phone] Phone:  [phone]     clotrimazole 10 MG edinson    mycophenolic acid 180 MG EC tablet                Primary Care Provider    Physician No Ref-Primary       No address on file        Thank you!     Thank you for choosing [facility] SPECIALTY AND PROCEDURE  for your care. Our goal is always to provide you with excellent care. Hearing back from our patients is one way we can continue to improve our services. Please take a few minutes to complete the written survey that you may receive in the mail after your visit with us. Thank you!             Your Updated Medication List - Protect others around you: Learn how to safely use, store and throw away your medicines at www.disposemymeds.org.          This list is accurate as of: 5/5/17 11:34 AM.  Always use your most recent med list.                   Brand Name Dispense Instructions for use    acetaminophen 325 MG tablet    TYLENOL    100 tablet    Take 2 tablets (650 mg) by mouth every 4 hours as needed for mild pain or fever       amLODIPine 5 MG tablet    NORVASC    30 tablet    Take 2 tablets (10 mg) by mouth daily       aspirin 325 MG EC tablet     30 tablet    Take 1 tablet (325 mg) by mouth daily       cholecalciferol 1000 UNITS capsule    vitamin  -D    30 capsule    Take 1 capsule (1,000 Units) by mouth daily       clotrimazole 10 MG edinson     70 Edinson    Place 1 Edinson (10 mg) inside cheek 5 times daily       magnesium oxide 400 MG tablet    MAG-OX    120 tablet    Take 1 tablet (400 mg) by mouth 2 times daily       mycophenolic acid 180 MG EC tablet    MYFORTIC - GENERIC EQUIVALENT    240 tablet    Take 3 tablets (540 mg) by mouth 2 times daily       ondansetron 4 MG tablet    ZOFRAN    30 tablet    Take 1 tablet (4 mg) by mouth every 8 hours as needed for nausea        senna-docusate 8.6-50 MG per tablet    SENOKOT-S;PERICOLACE    100 tablet    Take 1-2 tablets by mouth daily as needed for constipation       sodium bicarbonate 650 MG tablet     60 tablet    Take 1 tablet (650 mg) by mouth 2 times daily       sulfamethoxazole-trimethoprim 400-80 MG per tablet    BACTRIM/SEPTRA    12 tablet    Take 1 tablet by mouth Every Mon, Wed, Fri Morning       * tacrolimus 1 MG capsule    PROGRAF - GENERIC EQUIVALENT    120 capsule    Take 2 capsules (2 mg) by mouth 2 times daily       * tacrolimus 0.5 MG capsule    PROGRAF - GENERIC EQUIVALENT    60 capsule    Take 1 capsule (0.5 mg) by mouth 2 times daily       valGANciclovir 450 MG tablet    VALCYTE    8 tablet    Take 1 tablet (450 mg) by mouth twice a week       * Notice:  This list has 2 medication(s) that are the same as other medications prescribed for you. Read the directions carefully, and ask your doctor or other care provider to review them with you.

## 2017-05-05 NOTE — MR AVS SNAPSHOT
After Visit Summary   5/5/2017    Rosibel Willingham    MRN: [id number]           Patient Information     Date Of Birth          1975        Visit Information        Provider Department      5/5/2017 8:00 AM Sid Blankenship MD [facility] Specialty and Procedure        Today's Diagnoses     Kidney replaced by transplant    -  1    Diarrhea, unspecified type        Candidiasis of mouth          Care Instructions    Dear Rosibel Willingahm    Thank you for choosing [facility] Special[facility] ([facility]) for your transplant cares.  The following information is a summary of our appointment as well as important reminders.      Please make sure your phone is available today because I will call to update you with your anti-rejection drug levels and possibly make changes to your anti-rejection dosages.    MEDICATION CHANGES:   1. We are switching you from cellcept to mycophenolic acid (myfortic) 3 tablets (540 mg) two times daily  2. Take your zofran every 6 hours for the next 2-3 days to help with the nausea   3. Start taking your Edinson 1 tablet 5 times daily: Let the tablet melt in your cheek and do not eat or drink anything for 30 minutes after taking. This is to help with thrush.   4. Return Sunday for labs and assessment.        We look forward in seeing you on your next appointment here at [facility].  Please don t hesitate to call us at [phone] to reschedule any of your appointments or to speak with one of the [facility] registered nurses.  It was a pleasure taking care of you today.    Sincerely,  Leah Mcgee RN  [facility]  Specialty Infusion & Procedure Center  [address] [place] [address]  [address]  Phone:  [phone]        Follow-ups after your visit        Your next 10 appointments already scheduled     May 07, 2017  7:00 AM CDT   (Arrive by 6:45 AM)   New Transplant Visit with  SPEC  INFUSION, UC 41 ATC   [facility] Specialty and Procedure ([facility])    [address]  2nd Floor  [place] [address] [phone]            May 15, 2017  2:00 PM CDT   (Arrive by 1:45 PM)   Kidney Post Op with Leanne Montelongo MD   [facility] Solid Organ Transplant ([facility])    [address]  3rd Floor  [place] [id number] [phone]            May 22, 2017   Procedure with Leanne Montelongo MD   [facility] [place], Same Day Surgery (--)    [address]  [facility] [place] [address]   [phone]            May 22, 2017  1:00 PM CDT   (Arrive by 12:30 PM)   Return Kidney Transplant with Benjamin Beltran MD   [facility] Nephrology ([facility])    [address]  3rd Floor  [place] [id number] [phone]            Jul 24, 2017  1:10 PM CDT   (Arrive by 12:40 PM)   Return Kidney Transplant with Benjamin Beltran MD   [facility] Nephrology ([facility])    [address]  3rd Floor  [place] [facility] [place] [id number] [phone]              Future tests that were ordered for you today     Open Future Orders        Priority Expected Expires Ordered    Enteric Bacteria and Virus Panel by ALFONSO Stool Routine  6/4/2017 5/5/2017    Clostridium difficile toxin B PCR Routine  6/4/2017 5/5/2017            Who to contact     If you have questions or need follow up information about today's clinic visit or your schedule please contact [facility] SPECIALTY AND PROCEDURE directly at [phone].  Normal or non-critical lab and imaging results will be communicated to you by MyChart, letter or phone within 4 business days after the clinic has received the results. If you do not hear from us within 7 days, please contact the clinic through MyChart or phone. If you have a critical or abnormal lab result, we will notify you by phone as soon as  "possible.  Submit refill requests through BollingoBlog or call your pharmacy and they will forward the refill request to us. Please allow 3 business days for your refill to be completed.          Additional Information About Your Visit        BollingoBlog Information     BollingoBlog lets you send messages to your doctor, view your test results, renew your prescriptions, schedule appointments and more. To sign up, go to [email or web address] . Click on \"Log in\" on the left side of the screen, which will take you to the Welcome page. Then click on \"Sign up Now\" on the right side of the page.     You will be asked to enter the access code listed below, as well as some personal information. Please follow the directions to create your username and password.     Your access code is: [email or web address]  Expires: 2017  2:16 PM     Your access code will  in 90 days. If you need help or a new code, please call your [place] clinic or [phone].        Care EveryWhere ID     This is your Care EveryWhere ID. This could be used by other organizations to access your [place] medical records  [id number]         Blood Pressure from Last 3 Encounters:   17 128/75   17 (P) 145/83   17 (!) 170/97    Weight from Last 3 Encounters:   17 50.2 kg (110 lb 9.6 oz)   17 50 kg (110 lb 4.8 oz)   17 50.6 kg (111 lb 8 oz)                 Today's Medication Changes          These changes are accurate as of: 17  9:38 AM.  If you have any questions, ask your nurse or doctor.               Start taking these medicines.        Dose/Directions    clotrimazole 10 MG edinson   Used for:  Kidney replaced by transplant, Candidiasis of mouth   Started by:  Sid Blankenship MD        Dose:  1 Edinson   Place 1 Edinson (10 mg) inside cheek 5 times daily   Quantity:  70 Edinson   Refills:  0       mycophenolic acid 180 MG EC tablet   Commonly known as:  MYFORTIC - GENERIC EQUIVALENT   Used for:  Kidney replaced by " transplant   Started by:  Sid Blankenship MD        Dose:  540 mg   Take 3 tablets (540 mg) by mouth 2 times daily   Quantity:  240 tablet   Refills:  11         These medicines have changed or have updated prescriptions.        Dose/Directions    * tacrolimus 1 MG capsule   Commonly known as:  PROGRAF - GENERIC EQUIVALENT   This may have changed:  how much to take   Used for:  Kidney replaced by transplant, Immunosuppression (H)        Dose:  2 mg   Take 2 capsules (2 mg) by mouth 2 times daily   Quantity:  120 capsule   Refills:  11       * tacrolimus 0.5 MG capsule   Commonly known as:  PROGRAF - GENERIC EQUIVALENT   This may have changed:  Another medication with the same name was changed. Make sure you understand how and when to take each.   Used for:  Kidney replaced by transplant        Dose:  0.5 mg   Take 1 capsule (0.5 mg) by mouth 2 times daily   Quantity:  60 capsule   Refills:  11       * Notice:  This list has 2 medication(s) that are the same as other medications prescribed for you. Read the directions carefully, and ask your doctor or other care provider to review them with you.      Stop taking these medicines if you haven't already. Please contact your care team if you have questions.     mycophenolate 250 MG capsule   Commonly known as:  CELLCEPT - GENERIC EQUIVALENT   Stopped by:  Sid Blankenship MD                Where to get your medicines      These medications were sent to [address] [address]    Hours:  TRANSPLANT PHONE NUMBER [phone] Phone:  [phone]     clotrimazole 10 MG roya    mycophenolic acid 180 MG EC tablet                Primary Care Provider    Physician No Ref-Primary       No address on file        Thank you!     Thank you for choosing [facility] SPECIALTY AND PROCEDURE  for your care. Our goal is always to provide you  with excellent care. Hearing back from our patients is one way we can continue to improve our services. Please take a few minutes to complete the written survey that you may receive in the mail after your visit with us. Thank you!             Your Updated Medication List - Protect others around you: Learn how to safely use, store and throw away your medicines at www.disposemymeds.org.          This list is accurate as of: 5/5/17  9:38 AM.  Always use your most recent med list.                   Brand Name Dispense Instructions for use    acetaminophen 325 MG tablet    TYLENOL    100 tablet    Take 2 tablets (650 mg) by mouth every 4 hours as needed for mild pain or fever       amLODIPine 5 MG tablet    NORVASC    30 tablet    Take 2 tablets (10 mg) by mouth daily       aspirin 325 MG EC tablet     30 tablet    Take 1 tablet (325 mg) by mouth daily       cholecalciferol 1000 UNITS capsule    vitamin  -D    30 capsule    Take 1 capsule (1,000 Units) by mouth daily       clotrimazole 10 MG edinson     70 Edinson    Place 1 Edinson (10 mg) inside cheek 5 times daily       magnesium oxide 400 MG tablet    MAG-OX    120 tablet    Take 1 tablet (400 mg) by mouth 2 times daily       mycophenolic acid 180 MG EC tablet    MYFORTIC - GENERIC EQUIVALENT    240 tablet    Take 3 tablets (540 mg) by mouth 2 times daily       ondansetron 4 MG tablet    ZOFRAN    30 tablet    Take 1 tablet (4 mg) by mouth every 8 hours as needed for nausea       senna-docusate 8.6-50 MG per tablet    SENOKOT-S;PERICOLACE    100 tablet    Take 1-2 tablets by mouth daily as needed for constipation       sodium bicarbonate 650 MG tablet     60 tablet    Take 1 tablet (650 mg) by mouth 2 times daily       sulfamethoxazole-trimethoprim 400-80 MG per tablet    BACTRIM/SEPTRA    12 tablet    Take 1 tablet by mouth Every Mon, Wed, Fri Morning       * tacrolimus 1 MG capsule    PROGRAF - GENERIC EQUIVALENT    120 capsule    Take 2 capsules (2 mg) by mouth 2  times daily       * tacrolimus 0.5 MG capsule    PROGRAF - GENERIC EQUIVALENT    60 capsule    Take 1 capsule (0.5 mg) by mouth 2 times daily       valGANciclovir 450 MG tablet    VALCYTE    8 tablet    Take 1 tablet (450 mg) by mouth twice a week       * Notice:  This list has 2 medication(s) that are the same as other medications prescribed for you. Read the directions carefully, and ask your doctor or other care provider to review them with you.

## 2017-05-05 NOTE — LETTER
5/5/2017      RE: Rosibel Willingham  [address] [facility] [address]       Assessment and Plan:  1. DDKT - Patient had DGF but now with improving kidney function.  Etiology of DGF likely from tubular injury related to ischemia.  Consider biopsy if creatinine not improving.  Drain in place with improving output.  Continue prograf at 3mg bid.  Change Immunosuppression from mycophenolate to myfortic. 1 L IV fluids today for ongoing volume depletion.  2. Hypertension - Blood pressure is at goal less than 140/90.  Continue amlodipine to 10 mg daily.    3. Hypovolemic - Worse today with weight down to 108 lbs (down from 110).  1 L IV fluids today.  4. Anemia - stable hemoglobin. Monitor for aranesp needs (not on it previously)  Patient is iron replete.   5. Renal Secondary Hyperparathyroidism - mildly elevated PTH at 149.  Patient has mild vitamin d insufficiency.  Recheck PTH at 3 months.  6. Vitamin D insufficiency - at 29.  On cholecalciferol 1000 units daily  7. Prophylaxis - EBV D+R+, CMV D+R+.  Valcyte for 3 months.  8. Nausea - asked patient to schedule zofran q6 hours over the weekend  9. Hypomagnesemia - continue magnesium oxide 400 mg bid  10. Diarrhea - check stool for Cdiff and norovirus.  Change  11.    Follow up with labs tomorrow.    Assessment and plan was discussed with patient and she voiced her understanding and agreement.    Attestation:  This patient has been seen and evaluated by me, Sid Blankenship MD.  I have reviewed the note and agree with plan of care as documented by the fellow.       Reason for Visit:  Ms. Willingham is here for hospital follow up following kidney transplant.    HPI:   Rosibel Willingham is a 41 year old female with ESKD from unknown etiology and is status post DDKT on 4/23/17.         Transplant Hx:       Tx: DDKT  Date: 4/23/17       Present Maintenance IS: Tacrolimus and Mycophenolate mofetil       Baseline Creatinine: TBD       Recent DSA: No  PRA ():     Class I:       Class II:        Biopsy: No    Patient with about 1 L of fluid intake and three small meals.  The nausea is still affecting her.  She notes robust urine output ongoing.  Drain output remains at 100 ml/day.  She does note some pain around the pelvic bone but denies dysuria or hematuria.  UA negative for infection yesterday.  Renal ultrasound done yesterday wnl.  No edema.  No fevers, chills.  Denies parasthesia.  Her prograf levels are low are improving.    Home BP: 150/90.      ROS:   A comprehensive review of systems was obtained and negative, except as noted in the HPI or PMH.    Active Medical Problems:  Patient Active Problem List   Diagnosis     End stage kidney disease (H)     Anemia of chronic kidney failure     Secondary hyperparathyroidism (H)     Hypertension     Kidney transplant candidate     -donor kidney transplant     Immunosuppression (H)     Delayed graft function of kidney     Aftercare following organ transplant       Personal Hx:  Social History     Social History     Marital status:      Spouse name: N/A     Number of children: N/A     Years of education: N/A     Occupational History     Not on file.     Social History Main Topics     Smoking status: Former Smoker     Packs/day: 0.50     Years: 2.00     Types: Cigarettes     Quit date: 1998     Smokeless tobacco: Never Used     Alcohol use 1.2 oz/week     2 Standard drinks or equivalent per week     Drug use: No     Sexual activity: Not on file     Other Topics Concern     Not on file     Social History Narrative       Allergies:  Allergies   Allergen Reactions     Erythromycin Hives       Medications:  Prior to Admission medications    Medication Sig Start Date End Date Taking? Authorizing Provider   tacrolimus (PROGRAF - GENERIC EQUIVALENT) 0.5 MG capsule Take 1 capsule (0.5 mg) by mouth 2 times daily 17   Benjamin Beltran MD   mycophenolate (CELLCEPT - GENERIC EQUIVALENT) 250 MG capsule Take 3 capsules (750 mg)  by mouth 2 times daily 4/27/17   Fast, Tawny Alexandre PA-C   tacrolimus (PROGRAF - GENERIC EQUIVALENT) 1 MG capsule Take 2 capsules (2 mg) by mouth 2 times daily 4/27/17   Fast, Tawny Alexandre PA-C   amLODIPine (NORVASC) 5 MG tablet Take 1 tablet (5 mg) by mouth daily 4/27/17   Fast, Tawny Alexandre PA-C   sulfamethoxazole-trimethoprim (BACTRIM/SEPTRA) 400-80 MG per tablet Take 1 tablet by mouth Every Mon, Wed, Fri Morning 4/28/17   Fast, Tawny Alexandre PA-C   valGANciclovir (VALCYTE) 450 MG tablet Take 1 tablet (450 mg) by mouth twice a week 4/27/17 7/26/17  Fast, Tawny Alexandre PA-C   aspirin  MG EC tablet Take 1 tablet (325 mg) by mouth daily 4/27/17 4/27/18  Fast, Tawny Alexandre PA-C   acetaminophen (TYLENOL) 325 MG tablet Take 2 tablets (650 mg) by mouth every 4 hours as needed for mild pain or fever 4/27/17   Fast, Tawny Alexandre PA-C   senna-docusate (SENOKOT-S;PERICOLACE) 8.6-50 MG per tablet Take 1-2 tablets by mouth daily as needed for constipation 4/27/17   Fast, Tawny Alexandre PA-C       Vitals:  128/75 -> 117/73  89 -> 93  SpO2 100    Exam:   GENERAL APPEARANCE: alert and no distress  HENT: whitish tongue  LYMPHATICS: no cervical or supraclavicular nodes  RESP: lungs clear to auscultation - no rales, rhonchi or wheezes  CV: regular rhythm, normal rate, no rub, no murmur  EDEMA: No LE edema bilaterally  ABDOMEN: soft, nondistended, nontender, bowel sounds normal  MS: extremities normal - no gross deformities noted, no evidence of inflammation in joints, no muscle tenderness  SKIN: no rash  TX KIDNEY: normal    Results:   Reviewed    Sid Blankenship MD

## 2017-05-05 NOTE — PATIENT INSTRUCTIONS
Dear Rosibel Willingham    Thank you for choosing [facility] ([facility]) for your transplant cares.  The following information is a summary of our appointment as well as important reminders.      Please make sure your phone is available today because I will call to update you with your anti-rejection drug levels and possibly make changes to your anti-rejection dosages.    MEDICATION CHANGES:   1. We are switching you from cellcept to mycophenolic acid (myfortic) 3 tablets (540 mg) two times daily  2. Take your zofran every 6 hours for the next 2-3 days to help with the nausea   3. Start taking your Edinson 1 tablet 5 times daily: Let the tablet melt in your cheek and do not eat or drink anything for 30 minutes after taking. This is to help with thrush.   4. Return Sunday for labs and assessment.        We look forward in seeing you on your next appointment here at [facility].  Please don t hesitate to call us at [phone] to reschedule any of your appointments or to speak with one of the [facility] registered nurses.  It was a pleasure taking care of you today.    Sincerely,  Leah Mcgee, RN  [facility]  Specialty Infusion & Procedure Center  [address] [place] [address]  [address]  Phone:  [phone]

## 2017-05-07 ENCOUNTER — INFUSION THERAPY VISIT (OUTPATIENT)
Dept: INFUSION THERAPY | Facility: CLINIC | Age: 42
End: 2017-05-07
Attending: INTERNAL MEDICINE
Payer: COMMERCIAL

## 2017-05-07 VITALS
TEMPERATURE: 96.9 F | DIASTOLIC BLOOD PRESSURE: 73 MMHG | WEIGHT: 107.3 LBS | SYSTOLIC BLOOD PRESSURE: 113 MMHG | OXYGEN SATURATION: 99 % | BODY MASS INDEX: 19.63 KG/M2 | RESPIRATION RATE: 18 BRPM

## 2017-05-07 DIAGNOSIS — Z94.0 KIDNEY REPLACED BY TRANSPLANT: Primary | ICD-10-CM

## 2017-05-07 DIAGNOSIS — D84.9 IMMUNOSUPPRESSION (H): ICD-10-CM

## 2017-05-07 PROBLEM — Z76.82 KIDNEY TRANSPLANT CANDIDATE: Status: RESOLVED | Noted: 2017-04-22 | Resolved: 2017-05-07

## 2017-05-07 PROBLEM — E55.9 VITAMIN D DEFICIENCY: Status: ACTIVE | Noted: 2017-05-07

## 2017-05-07 PROBLEM — E87.20 METABOLIC ACIDOSIS: Status: ACTIVE | Noted: 2017-05-07

## 2017-05-07 PROBLEM — T86.19 DELAYED GRAFT FUNCTION OF KIDNEY: Status: RESOLVED | Noted: 2017-05-01 | Resolved: 2017-05-07

## 2017-05-07 PROBLEM — E87.20 ACIDOSIS: Status: ACTIVE | Noted: 2017-05-07

## 2017-05-07 PROBLEM — I95.1 ORTHOSTATIC HYPOTENSION: Status: ACTIVE | Noted: 2017-05-07

## 2017-05-07 PROBLEM — E87.6 HYPOKALEMIA: Status: ACTIVE | Noted: 2017-05-07

## 2017-05-07 PROBLEM — E83.42 HYPOMAGNESEMIA: Status: ACTIVE | Noted: 2017-05-07

## 2017-05-07 LAB
ANION GAP SERPL CALCULATED.3IONS-SCNC: 11 MMOL/L (ref 3–14)
BASOPHILS # BLD AUTO: 0 10E9/L (ref 0–0.2)
BASOPHILS NFR BLD AUTO: 0.6 %
BUN SERPL-MCNC: 61 MG/DL (ref 7–30)
CALCIUM SERPL-MCNC: 9.7 MG/DL (ref 8.5–10.1)
CHLORIDE SERPL-SCNC: 110 MMOL/L (ref 94–109)
CO2 SERPL-SCNC: 20 MMOL/L (ref 20–32)
CREAT SERPL-MCNC: 3.96 MG/DL (ref 0.52–1.04)
DIFFERENTIAL METHOD BLD: ABNORMAL
EOSINOPHIL # BLD AUTO: 0.2 10E9/L (ref 0–0.7)
EOSINOPHIL NFR BLD AUTO: 2.1 %
ERYTHROCYTE [DISTWIDTH] IN BLOOD BY AUTOMATED COUNT: 15 % (ref 10–15)
GFR SERPL CREATININE-BSD FRML MDRD: 12 ML/MIN/1.7M2
GLUCOSE SERPL-MCNC: 98 MG/DL (ref 70–99)
HCT VFR BLD AUTO: 25.7 % (ref 35–47)
HGB BLD-MCNC: 8.4 G/DL (ref 11.7–15.7)
IMM GRANULOCYTES # BLD: 0 10E9/L (ref 0–0.4)
IMM GRANULOCYTES NFR BLD: 0.4 %
LYMPHOCYTES # BLD AUTO: 0.4 10E9/L (ref 0.8–5.3)
LYMPHOCYTES NFR BLD AUTO: 5.9 %
MAGNESIUM SERPL-MCNC: 1.6 MG/DL (ref 1.6–2.3)
MCH RBC QN AUTO: 29.2 PG (ref 26.5–33)
MCHC RBC AUTO-ENTMCNC: 32.7 G/DL (ref 31.5–36.5)
MCV RBC AUTO: 89 FL (ref 78–100)
MONOCYTES # BLD AUTO: 0.3 10E9/L (ref 0–1.3)
MONOCYTES NFR BLD AUTO: 3.5 %
NEUTROPHILS # BLD AUTO: 6.3 10E9/L (ref 1.6–8.3)
NEUTROPHILS NFR BLD AUTO: 87.5 %
NRBC # BLD AUTO: 0 10*3/UL
NRBC BLD AUTO-RTO: 0 /100
PHOSPHATE SERPL-MCNC: 4.7 MG/DL (ref 2.5–4.5)
PLATELET # BLD AUTO: 369 10E9/L (ref 150–450)
POTASSIUM SERPL-SCNC: 4.5 MMOL/L (ref 3.4–5.3)
RBC # BLD AUTO: 2.88 10E12/L (ref 3.8–5.2)
SODIUM SERPL-SCNC: 142 MMOL/L (ref 133–144)
TACROLIMUS BLD-MCNC: 15.6 UG/L (ref 5–15)
TME LAST DOSE: ABNORMAL H
WBC # BLD AUTO: 7.2 10E9/L (ref 4–11)

## 2017-05-07 PROCEDURE — 36415 COLL VENOUS BLD VENIPUNCTURE: CPT

## 2017-05-07 PROCEDURE — 83735 ASSAY OF MAGNESIUM: CPT | Performed by: INTERNAL MEDICINE

## 2017-05-07 PROCEDURE — 80048 BASIC METABOLIC PNL TOTAL CA: CPT | Performed by: INTERNAL MEDICINE

## 2017-05-07 PROCEDURE — 84100 ASSAY OF PHOSPHORUS: CPT | Performed by: INTERNAL MEDICINE

## 2017-05-07 PROCEDURE — 85025 COMPLETE CBC W/AUTO DIFF WBC: CPT | Performed by: INTERNAL MEDICINE

## 2017-05-07 PROCEDURE — 80197 ASSAY OF TACROLIMUS: CPT | Performed by: INTERNAL MEDICINE

## 2017-05-07 RX ORDER — TACROLIMUS 1 MG/1
2 CAPSULE ORAL 2 TIMES DAILY
COMMUNITY
Start: 2017-05-07 | End: 2017-05-09

## 2017-05-07 NOTE — PATIENT INSTRUCTIONS
Dear Rosibel Willingham    Thank you for choosing [facility] ([facility]) for your transplant cares.  The following information is a summary of our appointment as well as important reminders.      Please make sure your phone is available today because I will call to update you with your anti-rejection drug levels and possibly make changes to your anti-rejection dosages.    MEDICATION CHANGES:   We look forward in seeing you on your next appointment here at [facility].  Please don t hesitate to call us at [phone] to reschedule any of your appointments or to speak with one of the [facility] registered nurses.  It was a pleasure taking care of you today.    Sincerely,  Leah Mcgee, RN  [facility]  Specialty Infusion & Procedure Center  [address] [place] [address]  [address]  Phone:  [phone]

## 2017-05-07 NOTE — MR AVS SNAPSHOT
After Visit Summary   5/7/2017    Rosibel Willingham    MRN: [id number]           Patient Information     Date Of Birth          1975        Visit Information        Provider Department      5/7/2017 7:00 AM UC 41 ATC; UC SPEC INFUSION [facility] Specialty and Procedure        Today's Diagnoses     Kidney replaced by transplant    -  1    Immunosuppression (H)          Care Instructions    Dear Rosibel Willingham    Thank you for choosing [facility] ([facility]) for your transplant cares.  The following information is a summary of our appointment as well as important reminders.      Please make sure your phone is available today because I will call to update you with your anti-rejection drug levels and possibly make changes to your anti-rejection dosages.    MEDICATION CHANGES:   We look forward in seeing you on your next appointment here at [facility].  Please don t hesitate to call us at [phone] to reschedule any of your appointments or to speak with one of the [facility] registered nurses.  It was a pleasure taking care of you today.    Sincerely,  Leah Mcgee RN  [facility]  Specialty Infusion & Procedure Center  [address] [place] [address]  [address]  Phone:  [phone]        Follow-ups after your visit        Your next 10 appointments already scheduled     May 15, 2017  2:00 PM CDT   (Arrive by 1:45 PM)   Kidney Post Op with Leanne Montelongo MD   [facility] Solid Organ Transplant ([facility] Clin[facility])    [address] [address] [phone]            May 22, 2017   Procedure with Leanne Montelongo MD   [facility] [place], Same Day Surgery (--)    [address] [facility] [address] [phone]            May 22, 2017  1:00 PM CDT   (Arrive by 12:30 PM)   Return Kidney Transplant with Benjamin Beltran MD   [facility] Nephrology ([facility]  "Clinics and Surgery Center)    [address] [place] [id number] [phone]            2017  1:10 PM CDT   (Arrive by 12:40 PM)   Return Kidney Transplant with Benjamin Beltran MD   [facility] Nephrology ([facility] [place])    [address] [place] [id number] [phone]              Who to contact     If you have questions or need follow up information about today's clinic visit or your schedule please contact [facility] SPECIALTY AND PROCEDURE directly at [phone].  Normal or non-critical lab and imaging results will be communicated to you by [email or web address], letter or phone within 4 business days after the clinic has received the results. If you do not hear from us within 7 days, please contact the clinic through [email or web address] or phone. If you have a critical or abnormal lab result, we will notify you by phone as soon as possible.  Submit refill requests through [employer] or call your pharmacy and they will forward the refill request to us. Please allow 3 business days for your refill to be completed.          Additional Information About Your Visit        [email or web address] Information     [employer] lets you send messages to your doctor, view your test results, renew your prescriptions, schedule appointments and more. To sign up, go to [email or web address] . Click on \"Log in\" on the left side of the screen, which will take you to the Welcome page. Then click on \"Sign up Now\" on the right side of the page.     You will be asked to enter the access code listed below, as well as some personal information. Please follow the directions to create your username and password.     Your access code is: [email or web address]  Expires: 2017  2:16 PM     Your access code will  in 90 days. If you need help or a new code, please call your [place] clinic or [phone].        Care EveryWhere ID     This is your Care EveryWhere ID. This " could be used by other organizations to access your [place] medical records  [id number]        Your Vitals Were     Temperature Respirations Pulse Oximetry BMI (Body Mass Index)          96.9  F (36.1  C) (Oral) 18 99% 19.63 kg/m2         Blood Pressure from Last 3 Encounters:   05/07/17 113/73   05/05/17 128/75   05/04/17 (P) 145/83    Weight from Last 3 Encounters:   05/07/17 48.7 kg (107 lb 4.8 oz)   05/05/17 49.1 kg (108 lb 3.2 oz)   05/04/17 50.2 kg (110 lb 9.6 oz)              We Performed the Following     Basic metabolic panel     CBC with platelets differential     Magnesium     Phosphorus     Tacrolimus level          Today's Medication Changes          These changes are accurate as of: 5/7/17  2:03 PM.  If you have any questions, ask your nurse or doctor.               These medicines have changed or have updated prescriptions.        Dose/Directions    * tacrolimus 0.5 MG capsule   Commonly known as:  PROGRAF - GENERIC EQUIVALENT   This may have changed:  Another medication with the same name was changed. Make sure you understand how and when to take each.   Used for:  Kidney replaced by transplant        Dose:  0.5 mg   Take 1 capsule (0.5 mg) by mouth 2 times daily   Quantity:  60 capsule   Refills:  11       * tacrolimus 1 MG capsule   Commonly known as:  PROGRAF - GENERIC EQUIVALENT   This may have changed:  how much to take   Used for:  Kidney replaced by transplant, Immunosuppression (H)        Dose:  2 mg   Take 2 capsules (2 mg) by mouth 2 times daily   Refills:  0       * Notice:  This list has 2 medication(s) that are the same as other medications prescribed for you. Read the directions carefully, and ask your doctor or other care provider to review them with you.             Primary Care Provider    Physician No Ref-Primary       No address on file        Thank you!     Thank you for choosing [facility] SPECIALTY AND PROCEDURE  for your care. Our goal is always to  provide you with excellent care. Hearing back from our patients is one way we can continue to improve our services. Please take a few minutes to complete the written survey that you may receive in the mail after your visit with us. Thank you!             Your Updated Medication List - Protect others around you: Learn how to safely use, store and throw away your medicines at www.disposemymeds.org.          This list is accurate as of: 5/7/17  2:03 PM.  Always use your most recent med list.                   Brand Name Dispense Instructions for use    acetaminophen 325 MG tablet    TYLENOL    100 tablet    Take 2 tablets (650 mg) by mouth every 4 hours as needed for mild pain or fever       amLODIPine 5 MG tablet    NORVASC    30 tablet    Take 2 tablets (10 mg) by mouth daily       aspirin 325 MG EC tablet     30 tablet    Take 1 tablet (325 mg) by mouth daily       cholecalciferol 1000 UNITS capsule    vitamin  -D    30 capsule    Take 1 capsule (1,000 Units) by mouth daily       clotrimazole 10 MG edinson     70 Edinson    Place 1 Edinson (10 mg) inside cheek 5 times daily       magnesium oxide 400 MG tablet    MAG-OX    120 tablet    Take 1 tablet (400 mg) by mouth 2 times daily       mycophenolic acid 180 MG EC tablet    MYFORTIC - GENERIC EQUIVALENT    240 tablet    Take 3 tablets (540 mg) by mouth 2 times daily       ondansetron 4 MG tablet    ZOFRAN    30 tablet    Take 1 tablet (4 mg) by mouth every 8 hours as needed for nausea       senna-docusate 8.6-50 MG per tablet    SENOKOT-S;PERICOLACE    100 tablet    Take 1-2 tablets by mouth daily as needed for constipation       sodium bicarbonate 650 MG tablet     60 tablet    Take 1 tablet (650 mg) by mouth 2 times daily       sulfamethoxazole-trimethoprim 400-80 MG per tablet    BACTRIM/SEPTRA    12 tablet    Take 1 tablet by mouth Every Mon, Wed, Fri Morning       * tacrolimus 0.5 MG capsule    PROGRAF - GENERIC EQUIVALENT    60 capsule    Take 1 capsule (0.5 mg)  by mouth 2 times daily       * tacrolimus 1 MG capsule    PROGRAF - GENERIC EQUIVALENT     Take 2 capsules (2 mg) by mouth 2 times daily       valGANciclovir 450 MG tablet    VALCYTE    8 tablet    Take 1 tablet (450 mg) by mouth twice a week       * Notice:  This list has 2 medication(s) that are the same as other medications prescribed for you. Read the directions carefully, and ask your doctor or other care provider to review them with you.

## 2017-05-07 NOTE — PROGRESS NOTES
"Rosibel Willingham came to [facility] today for a lab and assess following a  donor transplant on 17. ATN likely due to 25 hour cold ischemia time. Required HD on POD 0 and 2 and on .     Discharge date: 17  Transplant coordinator: Chinyere Burnham  Phone number patient can be reached at: [phone]  Please call  Ian frank as well with Prograf dose: [phone]         Physical Assessment:  See physical assessment located under \"Document Flowsheets\".  Incision site: dermabond c/d/i. Patient able to verbalize s/s of infection to watch for.   Lines: n/a  Huang: n/a  J/P: drained 45 mls of serosanguinous fluid in the last 24 hours.   Urine clarity: reports clear yellow urine. Denies burning, stinging, or irritation.   Hydration: Patient drinking 2 1/2-3 liters of fluids a day.   Nutrition:  Patient reports her appetite has increased and has been able to eat small regular meals throughout the day. She reports only taking 1 zofran yesterday before dinner for nausea.   Last BM: Reports stools have solidified and is having 2-3 BMs a day.   Pain:  Reports 2/10 incisional pain and taking tylenol once a day with adequate relief.     Laboratory tests: Standard labs drawn.    Plan of care for today:   Labs and assessment reviewed with Dr. Blankenship today.          Medication changes:   Holding amlodipine 10 mg today and will reassess with tomorrow's blood pressure.       Patient education:    The following teaching topics were addressed: Incisional care, Signs/symptoms of infection, Good handwashing, Medications (purposes, doses and times of administration), Phone numbers to call with concenrs (Transplant coordinator, [facility] [address] and [facility]), Plan of care and Drain care   Patient verbalized understanding and all questions answered.    Drug level:  Prograf level today was 15.6: true 12 hour trough. Reviewed with Dr Blankenship who gave orders to take 2 mg tonight and start taking 2.5  mg BID tomorrow.  Patient " was updated with this information via voice message.     Face to face time: 5  minutes.     Discharge Plan    Pt will follow up with labs at 0800 on Sunday and [facility] Monday.   Discharge instructions reviewed with patient: YES  Patient/Representative verbalized understanding, all questions answered: NO    Discharged from unit at 1100 with whom: spouse to hotel.    Leah Mcgee RN

## 2017-05-08 ENCOUNTER — INFUSION THERAPY VISIT (OUTPATIENT)
Dept: INFUSION THERAPY | Facility: CLINIC | Age: 42
End: 2017-05-08
Attending: INTERNAL MEDICINE
Payer: COMMERCIAL

## 2017-05-08 VITALS
HEART RATE: 97 BPM | RESPIRATION RATE: 18 BRPM | TEMPERATURE: 98 F | DIASTOLIC BLOOD PRESSURE: 76 MMHG | SYSTOLIC BLOOD PRESSURE: 131 MMHG | WEIGHT: 107.2 LBS | BODY MASS INDEX: 19.61 KG/M2

## 2017-05-08 DIAGNOSIS — T86.19 DELAYED GRAFT FUNCTION OF KIDNEY: ICD-10-CM

## 2017-05-08 DIAGNOSIS — B37.0 THRUSH: ICD-10-CM

## 2017-05-08 DIAGNOSIS — Z94.0 KIDNEY REPLACED BY TRANSPLANT: Primary | ICD-10-CM

## 2017-05-08 DIAGNOSIS — E86.1 HYPOVOLEMIA: ICD-10-CM

## 2017-05-08 DIAGNOSIS — N18.6 END STAGE RENAL DISEASE (H): ICD-10-CM

## 2017-05-08 LAB
ALBUMIN UR-MCNC: 30 MG/DL
ANION GAP SERPL CALCULATED.3IONS-SCNC: 11 MMOL/L (ref 3–14)
APPEARANCE UR: CLEAR
BASOPHILS # BLD AUTO: 0.1 10E9/L (ref 0–0.2)
BASOPHILS NFR BLD AUTO: 0.8 %
BILIRUB UR QL STRIP: NEGATIVE
BUN SERPL-MCNC: 60 MG/DL (ref 7–30)
CALCIUM SERPL-MCNC: 9.4 MG/DL (ref 8.5–10.1)
CHLORIDE SERPL-SCNC: 111 MMOL/L (ref 94–109)
CO2 SERPL-SCNC: 20 MMOL/L (ref 20–32)
COLOR UR AUTO: ABNORMAL
CREAT SERPL-MCNC: 4.08 MG/DL (ref 0.52–1.04)
CREAT UR-MCNC: 40 MG/DL
DIFFERENTIAL METHOD BLD: ABNORMAL
EOSINOPHIL # BLD AUTO: 0.2 10E9/L (ref 0–0.7)
EOSINOPHIL NFR BLD AUTO: 2.1 %
ERYTHROCYTE [DISTWIDTH] IN BLOOD BY AUTOMATED COUNT: 15 % (ref 10–15)
GFR SERPL CREATININE-BSD FRML MDRD: 12 ML/MIN/1.7M2
GLUCOSE SERPL-MCNC: 92 MG/DL (ref 70–99)
GLUCOSE UR STRIP-MCNC: NEGATIVE MG/DL
HCT VFR BLD AUTO: 26.6 % (ref 35–47)
HGB BLD-MCNC: 8.5 G/DL (ref 11.7–15.7)
HGB UR QL STRIP: NEGATIVE
IMM GRANULOCYTES # BLD: 0 10E9/L (ref 0–0.4)
IMM GRANULOCYTES NFR BLD: 0.5 %
KETONES UR STRIP-MCNC: NEGATIVE MG/DL
LEUKOCYTE ESTERASE UR QL STRIP: NEGATIVE
LYMPHOCYTES # BLD AUTO: 0.5 10E9/L (ref 0.8–5.3)
LYMPHOCYTES NFR BLD AUTO: 6.4 %
MAGNESIUM SERPL-MCNC: 1.8 MG/DL (ref 1.6–2.3)
MCH RBC QN AUTO: 28.8 PG (ref 26.5–33)
MCHC RBC AUTO-ENTMCNC: 32 G/DL (ref 31.5–36.5)
MCV RBC AUTO: 90 FL (ref 78–100)
MONOCYTES # BLD AUTO: 0.2 10E9/L (ref 0–1.3)
MONOCYTES NFR BLD AUTO: 3.1 %
NEUTROPHILS # BLD AUTO: 6.5 10E9/L (ref 1.6–8.3)
NEUTROPHILS NFR BLD AUTO: 87.1 %
NITRATE UR QL: NEGATIVE
NRBC # BLD AUTO: 0 10*3/UL
NRBC BLD AUTO-RTO: 0 /100
PH UR STRIP: 7 PH (ref 5–7)
PHOSPHATE SERPL-MCNC: 4.3 MG/DL (ref 2.5–4.5)
PLATELET # BLD AUTO: 382 10E9/L (ref 150–450)
POTASSIUM SERPL-SCNC: 5 MMOL/L (ref 3.4–5.3)
PROT UR-MCNC: 0.49 G/L
PROT/CREAT 24H UR: 1.24 G/G CR (ref 0–0.2)
RBC # BLD AUTO: 2.95 10E12/L (ref 3.8–5.2)
RBC #/AREA URNS AUTO: 2 /HPF (ref 0–2)
SODIUM SERPL-SCNC: 142 MMOL/L (ref 133–144)
SP GR UR STRIP: 1.01 (ref 1–1.03)
SQUAMOUS #/AREA URNS AUTO: 1 /HPF (ref 0–1)
TACROLIMUS BLD-MCNC: 8 UG/L (ref 5–15)
TME LAST DOSE: NORMAL H
URN SPEC COLLECT METH UR: ABNORMAL
UROBILINOGEN UR STRIP-MCNC: 0 MG/DL (ref 0–2)
WBC # BLD AUTO: 7.5 10E9/L (ref 4–11)
WBC #/AREA URNS AUTO: 5 /HPF (ref 0–2)

## 2017-05-08 PROCEDURE — 85025 COMPLETE CBC W/AUTO DIFF WBC: CPT | Performed by: INTERNAL MEDICINE

## 2017-05-08 PROCEDURE — 84156 ASSAY OF PROTEIN URINE: CPT | Performed by: INTERNAL MEDICINE

## 2017-05-08 PROCEDURE — 96360 HYDRATION IV INFUSION INIT: CPT

## 2017-05-08 PROCEDURE — 83735 ASSAY OF MAGNESIUM: CPT | Performed by: INTERNAL MEDICINE

## 2017-05-08 PROCEDURE — 84100 ASSAY OF PHOSPHORUS: CPT | Performed by: INTERNAL MEDICINE

## 2017-05-08 PROCEDURE — 81001 URINALYSIS AUTO W/SCOPE: CPT | Performed by: INTERNAL MEDICINE

## 2017-05-08 PROCEDURE — 96361 HYDRATE IV INFUSION ADD-ON: CPT

## 2017-05-08 PROCEDURE — 36415 COLL VENOUS BLD VENIPUNCTURE: CPT

## 2017-05-08 PROCEDURE — 99212 OFFICE O/P EST SF 10 MIN: CPT | Mod: 25

## 2017-05-08 PROCEDURE — 80197 ASSAY OF TACROLIMUS: CPT | Performed by: INTERNAL MEDICINE

## 2017-05-08 PROCEDURE — 80048 BASIC METABOLIC PNL TOTAL CA: CPT | Performed by: INTERNAL MEDICINE

## 2017-05-08 PROCEDURE — 25000128 H RX IP 250 OP 636: Mod: ZF | Performed by: INTERNAL MEDICINE

## 2017-05-08 RX ORDER — AMLODIPINE BESYLATE 5 MG/1
5 TABLET ORAL DAILY
Qty: 30 TABLET | Refills: 3
Start: 2017-05-08 | End: 2017-05-22

## 2017-05-08 RX ORDER — NYSTATIN 100000/ML
500000 SUSPENSION, ORAL (FINAL DOSE FORM) ORAL 4 TIMES DAILY
Status: DISCONTINUED | OUTPATIENT
Start: 2017-05-08 | End: 2017-05-08 | Stop reason: CLARIF

## 2017-05-08 RX ADMIN — SODIUM CHLORIDE 2000 ML: 9 INJECTION, SOLUTION INTRAVENOUS at 09:50

## 2017-05-08 NOTE — PROGRESS NOTES
"Rosibel Willingham came to [facility] today for a lab and assess following a  kidney transplant on 2017.      Discharge date: 2017  Transplant coordinator: Chinyere Burnham RN  Phone number patient can be reached at: [phone]  Please call  Ian frank as well with Prograf dose: [phone]    Physical Assessment:  See physical assessment located under \"Document Flowsheets\".  Incision site: Dermabond, no signs of infection.  Lines: N/A  Huang: N/A  OCTAVIO: 30 cc last 24 hours. Today was the first day of 30 cc's in 24 hours. Per Dr. Whiteside patient to have 3 days of 30 cc's or less of output before removing.   Urine clarity: clear, yellow  Hydration: drinking adequate fluids  Nutrition: Appetite improving, decreased nausea   Last BM: 2 formed yesterday  Pain: 2/10 at incision site.      Laboratory tests: Standard labs drawn.    Plan of care for today: Labs and assessment. Seen by nephrology. Per Dr. Beltran, 2 L of NS today. Urine UA with protein/ Cr.  Labs repeated tomorrow.   Home care updated on plan of care.     Medication changes:   -For tomorrow, BP under 130 for systolic, decrease Norvasc dose to 5 mg.   -Stop roya, switch to Nystatin swish and swallow.  -Hold aspirin starting tomorrow, took daily dose this morning.    Medications administered:   2 L NS IV      Patient education:    The following teaching topics were addressed: Importance of drinking 2L of non-caffeinated fluids daily, Incisional care, Signs/symptoms of infection, Good handwashing, Medications (purposes, doses and times of administration), Phone numbers to call with concenrs (Transplant coordinator, [facility] and [facility]), Plan of care and Drain care   Patient verbalized understanding and all questions answered.    Drug level:  Prograf level today was 8 reviewed with Dr Paul who gave orders to remain on the same dose of 2.5 mg BID.  Patient was updated with this information and verbalized understanding.    Last tac dose 7:30 " 5/7.    Face to face time: 5 minutes    Discharge Plan    Pt will follow up with [facility] tomorrow.   Discharge instructions reviewed with patient: YES  Patient/Representative verbalized understanding, all questions answered: YES    Discharged from unit at 12 PM with whom:  to hotel.    Nelida Sloan RN

## 2017-05-08 NOTE — PATIENT INSTRUCTIONS
Dear Rosibel Willingham    Thank you for choosing [facility] Special[facility] ([facility]) for your transplant cares.  The following information is a summary of our appointment as well as important reminders.      Please make sure your phone is available today because I will call to update you with your anti-rejection drug levels and possibly make changes to your anti-rejection dosages.    -Tomorrow (5/9): Blood pressure under 130 for systolic, decrease Norvasc dose to 5 mg daily.  -Stop roya, switch to Nystatin swish and swallow.  -Hold aspirin starting tomorrow am.        We look forward in seeing you on your next appointment here at [facility].  Please don t hesitate to call us at [phone] to reschedule any of your appointments or to speak with one of the [facility] registered nurses.  It was a pleasure taking care of you today.    Sincerely,  Nelida Sloan, RN  [facility]  Specialty Infusion & Procedure Center  [address] [place] [address]  [address]  Phone:  [phone]

## 2017-05-08 NOTE — MR AVS SNAPSHOT
After Visit Summary   5/8/2017    Rosibel Willingham    MRN: [id number]           Patient Information     Date Of Birth          1975        Visit Information        Provider Department      5/8/2017 8:00 AM Benjamin Beltran MD [facility]        Today's Diagnoses     Kidney replaced by transplant    -  1    Delayed graft function of kidney        Hypovolemia        Thrush        End stage renal disease (H)           Follow-ups after your visit        Your next 10 appointments already scheduled     May 09, 2017  7:00 AM CDT   (Arrive by 6:45 AM)   New Transplant Visit with UC SPEC INFUSION, UC 41 ATC   [facility] ([facility])    [address]  [address] [facility] [address] [phone]            May 09, 2017  8:00 AM CDT   (Arrive by 7:45 AM)   Return with Benjamin Beltran MD   [facility] ([facility])    [address]  [address] [facility] [address] [phone]            May 15, 2017  2:00 PM CDT   (Arrive by 1:45 PM)   Kidney Post Op with Leanne Montelongo MD   [facility] Solid Organ Transplant ([facility])    [address]  [address] [facility] [address] [phone]            May 22, 2017   Procedure with Leanne Montelongo MD   [facility], [place], Same Day Surgery (--)    [address] [address] [phone]            May 22, 2017  1:00 PM CDT   (Arrive by 12:30 PM)   Return Kidney Transplant with Benjamin Beltran MD   [facility] Nephrology ([facility])    [address] [place] [address]  3rd Floor  [facility] [address] [phone]            Jul 24, 2017  1:10 PM CDT   (Arrive by 12:40 PM)   Return Kidney Transplant with Benjamin Beltran MD   [facility] Nephrology ([facility])    [address] [id number] [phone]              Who to contact     If you have questions or need follow up information about today's clinic visit or your schedule please contact [facility] SPECIALTY AND PROCEDURE directly at [phone].  Normal or non-critical lab and imaging results will be communicated to you by MyChart, letter or phone within 4 business days after the clinic has received the results. If you do not hear from us within 7 days, please contact the clinic through Nomad Mobile Guideshart or phone. If you have a critical or abnormal lab result, we will notify you by phone as soon as possible.  Submit refill requests through SlimTrader or call your pharmacy and they will forward the refill request to us. Please allow 3 business days for your refill to be completed.          Additional Information About Your Visit        MyChart Information     SlimTrader gives you secure access to your electronic health record. If you see a primary care provider, you can also send messages to your care team and make appointments. If you have questions, please call your primary care clinic.  If you do not have a primary care provider, please call [phone] and they will assist you.        Care EveryWhere ID     This is your Care EveryWhere ID. This could be used by other organizations to access your [facility] medical records  [id number]         Blood Pressure from Last 3 Encounters:   05/08/17 131/76   05/07/17 113/73   05/05/17 128/75    Weight from Last 3 Encounters:   05/08/17 48.6 kg (107 lb 3.2 oz)   05/07/17 48.7 kg (107 lb 4.8 oz)   05/05/17 49.1 kg (108 lb 3.2 oz)              We Performed the Following     Routine UA with micro reflex to culture          Today's Medication Changes          These changes are accurate as of: 5/8/17  4:59 PM.  If you have any questions, ask your nurse or doctor.               Start taking these medicines.        Dose/Directions    nystatin  237703 unit/mL Susp suspension   Commonly known as:  MYCOSTATIN   Used for:  Kidney replaced by transplant        Dose:  944617 Units   Swish and swallow 5 mLs (500,000 Units) in mouth 4 times daily   Quantity:  240 mL   Refills:  0         These medicines have changed or have updated prescriptions.        Dose/Directions    amLODIPine 5 MG tablet   Commonly known as:  NORVASC   This may have changed:  how much to take   Used for:  End stage renal disease (H)   Changed by:  Benjamin Beltran MD        Dose:  5 mg   Take 1 tablet (5 mg) by mouth daily   Quantity:  30 tablet   Refills:  3         Stop taking these medicines if you haven't already. Please contact your care team if you have questions.     aspirin 325 MG EC tablet   Stopped by:  Benjamin Beltran MD           clotrimazole 10 MG roya   Stopped by:  Benjamin Beltran MD                Where to get your medicines      These medications were sent to [address] [address]    Hours:  TRANSPLANT PHONE NUMBER [phone] Phone:  [phone]     nystatin 598464 unit/mL Susp suspension         Some of these will need a paper prescription and others can be bought over the counter.  Ask your nurse if you have questions.     You don't need a prescription for these medications     amLODIPine 5 MG tablet                Primary Care Provider    Physician No Ref-Primary       No address on file        Thank you!     Thank you for choosing [facility] SPECIALTY AND PROCEDURE  for your care. Our goal is always to provide you with excellent care. Hearing back from our patients is one way we can continue to improve our services. Please take a few minutes to complete the written survey that you may receive in the mail after your visit with us. Thank you!             Your Updated Medication List - Protect others around you: Learn how to  safely use, store and throw away your medicines at www.disposemymeds.org.          This list is accurate as of: 5/8/17  4:59 PM.  Always use your most recent med list.                   Brand Name Dispense Instructions for use    acetaminophen 325 MG tablet    TYLENOL    100 tablet    Take 2 tablets (650 mg) by mouth every 4 hours as needed for mild pain or fever       amLODIPine 5 MG tablet    NORVASC    30 tablet    Take 1 tablet (5 mg) by mouth daily       cholecalciferol 1000 UNITS capsule    vitamin  -D    30 capsule    Take 1 capsule (1,000 Units) by mouth daily       magnesium oxide 400 MG tablet    MAG-OX    120 tablet    Take 1 tablet (400 mg) by mouth 2 times daily       mycophenolic acid 180 MG EC tablet    MYFORTIC - GENERIC EQUIVALENT    240 tablet    Take 3 tablets (540 mg) by mouth 2 times daily       nystatin 982686 unit/mL Susp suspension    MYCOSTATIN    240 mL    Swish and swallow 5 mLs (500,000 Units) in mouth 4 times daily       ondansetron 4 MG tablet    ZOFRAN    30 tablet    Take 1 tablet (4 mg) by mouth every 8 hours as needed for nausea       senna-docusate 8.6-50 MG per tablet    SENOKOT-S;PERICOLACE    100 tablet    Take 1-2 tablets by mouth daily as needed for constipation       sodium bicarbonate 650 MG tablet     60 tablet    Take 1 tablet (650 mg) by mouth 2 times daily       sulfamethoxazole-trimethoprim 400-80 MG per tablet    BACTRIM/SEPTRA    12 tablet    Take 1 tablet by mouth Every Mon, Wed, Fri Morning       * tacrolimus 0.5 MG capsule    PROGRAF - GENERIC EQUIVALENT    60 capsule    Take 1 capsule (0.5 mg) by mouth 2 times daily       * tacrolimus 1 MG capsule    PROGRAF - GENERIC EQUIVALENT     Take 2 capsules (2 mg) by mouth 2 times daily       valGANciclovir 450 MG tablet    VALCYTE    8 tablet    Take 1 tablet (450 mg) by mouth twice a week       * Notice:  This list has 2 medication(s) that are the same as other medications prescribed for you. Read the directions  carefully, and ask your doctor or other care provider to review them with you.

## 2017-05-08 NOTE — PROGRESS NOTES
Assessment and Plan:  1. DDKT - Patient had DGF, improved Cr over weekend and not requiring dialysis but Cr did not trend down today. She had elevated tac level and this may be culprit, also hypovolemic with decreased weight again. Will give 2L NS today, tac dose previously reduced and lvl pending today. If Cr not improving in next few days we will biopsy and therefore we have held ASA. Will also get repeat UA and Pr:Cr (unknown primary kidney disease, presented late, had proteinuria 5 grams). Drain still in place, remove when 30cc or less/24hrs x3 days per surgery.   2. Hypertension - Blood pressure improved, decrease amlodipine to 5mg daily  3. Hypovolemic - Worse today again, 2L NS again today as above  4. Anemia - stable hemoglobin. Monitor for aranesp needs (not on it previously)  Patient is iron replete.   5. Renal Secondary Hyperparathyroidism - mildly elevated PTH at 149.  Patient has mild vitamin d insufficiency.  Recheck PTH at 3 months.  6. Vitamin D insufficiency - at 29.  On cholecalciferol 1000 units daily  7. Prophylaxis - EBV D+R+, CMV D+R+.  Valcyte for 3 months.  8. Nausea - improved, zofran prn  9. Hypomagnesemia - continue magnesium oxide 400 mg bid  10. Diarrhea - resolved  11. Thrush - stop lozenges (not tolerating) and start nystatin s/s qid    Follow up with labs tomorrow.    Assessment and plan was discussed with patient and she voiced her understanding and agreement.    Staffed with Dr. Beltran.    Master Paul  Nephrology Fellow  Pager: [phone]    Reason for Visit:  Ms. Willingham is here for hospital follow up following kidney transplant.    HPI:   Rosibel Willingham is a 41 year old female with ESKD from unknown etiology and is status post DDKT on 4/23/17.         Transplant Hx:       Tx: DDKT  Date: 4/23/17       Present Maintenance IS: Tacrolimus and Mycophenolate mofetil       Baseline Creatinine: TBD       Recent DSA: No  PRA ():     Class I:      Class II:        Biopsy: No    Still has  some nausea, no vomiting, improved with zofran. Not likely lozenges for thrush. Not eating a lot yet, but pushing fluids, appetite improving. She is having good UOP, weight continues to fall each day. Tac was elevated over weekend to 15.6 and dose reduced from 3mg to 2.5mg bid (took 2mg last evening, now 2.5mg bid). Cr is up slightly today (basically stable). She has no other new symptoms.    Home BP: ~150/90.      ROS:   A comprehensive review of systems was obtained and negative, except as noted in the HPI or PMH.    Active Medical Problems:  Patient Active Problem List   Diagnosis     Anemia in chronic renal disease     Secondary renal hyperparathyroidism (H)     Hypertension secondary to other renal disorders     Kidney replaced by transplant     Immunosuppressed status (H)     Aftercare following organ transplant     Vitamin D deficiency     Metabolic acidosis     Hypomagnesemia     Orthostatic hypotension     Hypokalemia     Acidosis     Personal Hx:  Social History     Social History     Marital status:      Spouse name: N/A     Number of children: N/A     Years of education: N/A     Occupational History     Not on file.     Social History Main Topics     Smoking status: Former Smoker     Packs/day: 0.50     Years: 2.00     Types: Cigarettes     Quit date: 9/26/1998     Smokeless tobacco: Never Used     Alcohol use 1.2 oz/week     2 Standard drinks or equivalent per week     Drug use: No     Sexual activity: Not on file     Other Topics Concern     Not on file     Social History Narrative       Allergies:  Allergies   Allergen Reactions     Erythromycin Hives       Medications:  Current Outpatient Prescriptions   Medication Sig Dispense Refill     amLODIPine (NORVASC) 5 MG tablet Take 1 tablet (5 mg) by mouth daily 30 tablet 3     nystatin (MYCOSTATIN) 286312 unit/mL SUSP suspension Swish and swallow 5 mLs (500,000 Units) in mouth 4 times daily 240 mL 0     tacrolimus (PROGRAF - GENERIC EQUIVALENT) 1  MG capsule Take 2 capsules (2 mg) by mouth 2 times daily       mycophenolic acid (MYFORTIC - GENERIC EQUIVALENT) 180 MG EC tablet Take 3 tablets (540 mg) by mouth 2 times daily 240 tablet 11     sodium bicarbonate 650 MG tablet Take 1 tablet (650 mg) by mouth 2 times daily 60 tablet 0     cholecalciferol (VITAMIN  -D) 1000 UNITS capsule Take 1 capsule (1,000 Units) by mouth daily 30 capsule 11     magnesium oxide (MAG-OX) 400 MG tablet Take 1 tablet (400 mg) by mouth 2 times daily 120 tablet 3     ondansetron (ZOFRAN) 4 MG tablet Take 1 tablet (4 mg) by mouth every 8 hours as needed for nausea 30 tablet 0     [DISCONTINUED] amLODIPine (NORVASC) 5 MG tablet Take 2 tablets (10 mg) by mouth daily 30 tablet 3     tacrolimus (PROGRAF - GENERIC EQUIVALENT) 0.5 MG capsule Take 1 capsule (0.5 mg) by mouth 2 times daily (Patient taking differently: Take 0.5 mg by mouth 2 times daily ) 60 capsule 11     sulfamethoxazole-trimethoprim (BACTRIM/SEPTRA) 400-80 MG per tablet Take 1 tablet by mouth Every Mon, Wed, Fri Morning 12 tablet 11     valGANciclovir (VALCYTE) 450 MG tablet Take 1 tablet (450 mg) by mouth twice a week 8 tablet 2     acetaminophen (TYLENOL) 325 MG tablet Take 2 tablets (650 mg) by mouth every 4 hours as needed for mild pain or fever 100 tablet 0     senna-docusate (SENOKOT-S;PERICOLACE) 8.6-50 MG per tablet Take 1-2 tablets by mouth daily as needed for constipation (Patient not taking: Reported on 5/1/2017) 100 tablet 0     Vitals:  Reviewed, /78, R18 and sats normal    Exam:   GENERAL APPEARANCE: alert and no distress  HENT: whitish tongue  LYMPHATICS: no cervical or supraclavicular nodes  RESP: lungs clear to auscultation - no rales, rhonchi or wheezes  CV: regular rhythm, normal rate, no rub, no murmur  EDEMA: No LE edema bilaterally  ABDOMEN: soft, nondistended, nontender, bowel sounds normal  MS: extremities normal - no gross deformities noted, no evidence of inflammation in joints, no muscle  tenderness  SKIN: no rash  TX KIDNEY: normal    Results:   Reviewed    Patient was seen and evaluated by me, Benjamin Beltran MD. I have reviewed the note and agree with the the plan of care as documented by the fellow.  Will hold ASA   May need biopsy at the end of the week if creatinine is not better

## 2017-05-08 NOTE — MR AVS SNAPSHOT
After Visit Summary   5/8/2017    Rosibel Willingham    MRN: [id number]           Patient Information     Date Of Birth          1975        Visit Information        Provider Department      5/8/2017 7:00 AM UC 41 ATC; UC SPEC INFUSION [facility] [place] Specialty [facility] Procedure        Today's Diagnoses     Kidney replaced by transplant    -  1      Care Instructions    Dear Rosibel Willingham    Thank you for choosing [facility] Special[facility] Infusion [facility] Procedure Center ([facility]) for your transplant cares.  The following information is a summary of our appointment as well as important reminders.      Please make sure your phone is available today because I will call to update you with your anti-rejection drug levels and possibly make changes to your anti-rejection dosages.    -Tomorrow (5/9): Blood pressure under 130 for systolic, decrease Norvasc dose to 5 mg daily.  -Stop roya, switch to Nystatin swish and swallow.  -Hold aspirin starting tomorrow am.        We look forward in seeing you on your next appointment here at [facility].  Please don t hesitate to call us at [phone] to reschedule any of your appointments or to speak with one of the [facility] registered nurses.  It was a pleasure taking care of you today.    Sincerely,  Nelida Sloan, RN  [facility]  Specialty Infusion & Procedure Center  [address] [place] [address]  [address]  Phone:  [phone]          Follow-ups after your visit        Your next 10 appointments already scheduled     May 15, 2017  2:00 PM CDT   (Arrive by 1:45 PM)   Kidney Post Op with Leanne Montelongo MD   [facility] Solid Organ Transplant ([facility] Clin[facility] Surgery [facility])    [address] [facility]  3rd [facility] [address] [phone]            May 22, 2017   Procedure with Leanne Montelongo MD   [facility], [place], Same Day Surgery (--)    [address] [facility] [address] [phone]             May 22, 2017  1:00 PM CDT   (Arrive by 12:30 PM)   Return Kidney Transplant with Benjamin Beltran MD   [facility] Neph[facility] ([facility])    [address] [place] [address] [phone]            Jul 24, 2017  1:10 PM CDT   (Arrive by 12:40 PM)   Return Kidney Transplant with Benjamin Beltran MD   [facility] Nephrology ([facility])    [address] [place] [address] [phone]              Future tests that were ordered for you today     Open Future Orders        Priority Expected Expires Ordered    Protein  random urine Routine  6/7/2017 5/8/2017            Who to contact     If you have questions or need follow up information about today's clinic visit or your schedule please contact [facility] SPECIALTY AND PROCEDURE directly at [phone].  Normal or non-critical lab and imaging results will be communicated to you by Aceablehart, letter or phone within 4 business days after the clinic has received the results. If you do not hear from us within 7 days, please contact the clinic through [employer] or phone. If you have a critical or abnormal lab result, we will notify you by phone as soon as possible.  Submit refill requests through Gogo or call your pharmacy and they will forward the refill request to us. Please allow 3 business days for your refill to be completed.          Additional Information About Your Visit        Aceablehart Information     Gogo gives you secure access to your electronic health record. If you see a primary care provider, you can also send messages to your care team and make appointments. If you have questions, please call your primary care clinic.  If you do not have a primary care provider, please call [phone] and they will assist you.        Care EveryWhere ID     This is your Care EveryWhere ID. This could be used by other organizations to access  your [facility] medical records  [id number]        Your Vitals Were     Temperature Respirations BMI (Body Mass Index)             98  F (36.7  C) (Oral) 18 19.61 kg/m2          Blood Pressure from Last 3 Encounters:   05/08/17 130/78   05/07/17 113/73   05/05/17 128/75    Weight from Last 3 Encounters:   05/08/17 48.6 kg (107 lb 3.2 oz)   05/07/17 48.7 kg (107 lb 4.8 oz)   05/05/17 49.1 kg (108 lb 3.2 oz)              We Performed the Following     Basic metabolic panel     CBC with platelets differential     Magnesium     Phosphorus     Tacrolimus level          Today's Medication Changes          These changes are accurate as of: 5/8/17 10:29 AM.  If you have any questions, ask your nurse or doctor.               Start taking these medicines.        Dose/Directions    nystatin 242567 unit/mL Susp suspension   Commonly known as:  MYCOSTATIN   Used for:  Kidney replaced by transplant        Dose:  209513 Units   Swish and swallow 5 mLs (500,000 Units) in mouth 4 times daily   Quantity:  240 mL   Refills:  0         These medicines have changed or have updated prescriptions.        Dose/Directions    amLODIPine 5 MG tablet   Commonly known as:  NORVASC   This may have changed:  how much to take   Used for:  End stage renal disease (H)   Changed by:  Benjamin Beltran MD        Dose:  5 mg   Take 1 tablet (5 mg) by mouth daily   Quantity:  30 tablet   Refills:  3         Stop taking these medicines if you haven't already. Please contact your care team if you have questions.     aspirin 325 MG EC tablet   Stopped by:  Benjamin Beltran MD           clotrimazole 10 MG roya   Stopped by:  Benjamin Beltran MD                Where to get your medicines      These medications were sent to [facility] [place], [place] - [address]  [address] [facility] [address]    Hours:  TRANSPLANT PHONE NUMBER [phone] Phone:  [phone]     nystatin 177909 unit/mL Susp  suspension         Some of these will need a paper prescription and others can be bought over the counter.  Ask your nurse if you have questions.     You don't need a prescription for these medications     amLODIPine 5 MG tablet                Primary Care Provider    Physician No Ref-Primary       No address on file        Thank you!     Thank you for choosing [facility] SPECIALTY AND PROCEDURE  for your care. Our goal is always to provide you with excellent care. Hearing back from our patients is one way we can continue to improve our services. Please take a few minutes to complete the written survey that you may receive in the mail after your visit with us. Thank you!             Your Updated Medication List - Protect others around you: Learn how to safely use, store and throw away your medicines at www.disposemymeds.org.          This list is accurate as of: 5/8/17 10:29 AM.  Always use your most recent med list.                   Brand Name Dispense Instructions for use    acetaminophen 325 MG tablet    TYLENOL    100 tablet    Take 2 tablets (650 mg) by mouth every 4 hours as needed for mild pain or fever       amLODIPine 5 MG tablet    NORVASC    30 tablet    Take 1 tablet (5 mg) by mouth daily       cholecalciferol 1000 UNITS capsule    vitamin  -D    30 capsule    Take 1 capsule (1,000 Units) by mouth daily       magnesium oxide 400 MG tablet    MAG-OX    120 tablet    Take 1 tablet (400 mg) by mouth 2 times daily       mycophenolic acid 180 MG EC tablet    MYFORTIC - GENERIC EQUIVALENT    240 tablet    Take 3 tablets (540 mg) by mouth 2 times daily       nystatin 025191 unit/mL Susp suspension    MYCOSTATIN    240 mL    Swish and swallow 5 mLs (500,000 Units) in mouth 4 times daily       ondansetron 4 MG tablet    ZOFRAN    30 tablet    Take 1 tablet (4 mg) by mouth every 8 hours as needed for nausea       senna-docusate 8.6-50 MG per tablet    SENOKOT-S;PERICOLACE    100 tablet     Take 1-2 tablets by mouth daily as needed for constipation       sodium bicarbonate 650 MG tablet     60 tablet    Take 1 tablet (650 mg) by mouth 2 times daily       sulfamethoxazole-trimethoprim 400-80 MG per tablet    BACTRIM/SEPTRA    12 tablet    Take 1 tablet by mouth Every Mon, Wed, Fri Morning       * tacrolimus 0.5 MG capsule    PROGRAF - GENERIC EQUIVALENT    60 capsule    Take 1 capsule (0.5 mg) by mouth 2 times daily       * tacrolimus 1 MG capsule    PROGRAF - GENERIC EQUIVALENT     Take 2 capsules (2 mg) by mouth 2 times daily       valGANciclovir 450 MG tablet    VALCYTE    8 tablet    Take 1 tablet (450 mg) by mouth twice a week       * Notice:  This list has 2 medication(s) that are the same as other medications prescribed for you. Read the directions carefully, and ask your doctor or other care provider to review them with you.

## 2017-05-09 ENCOUNTER — OFFICE VISIT (OUTPATIENT)
Dept: INFUSION THERAPY | Facility: CLINIC | Age: 42
End: 2017-05-09
Attending: INTERNAL MEDICINE
Payer: COMMERCIAL

## 2017-05-09 VITALS
OXYGEN SATURATION: 99 % | DIASTOLIC BLOOD PRESSURE: 80 MMHG | TEMPERATURE: 98.2 F | SYSTOLIC BLOOD PRESSURE: 133 MMHG | HEART RATE: 88 BPM

## 2017-05-09 DIAGNOSIS — D84.9 IMMUNOSUPPRESSED STATUS (H): ICD-10-CM

## 2017-05-09 DIAGNOSIS — I15.1 HYPERTENSION SECONDARY TO OTHER RENAL DISORDERS: ICD-10-CM

## 2017-05-09 DIAGNOSIS — D84.9 IMMUNOSUPPRESSION (H): ICD-10-CM

## 2017-05-09 DIAGNOSIS — N18.9 ANEMIA IN CHRONIC RENAL DISEASE: ICD-10-CM

## 2017-05-09 DIAGNOSIS — Z94.0 KIDNEY REPLACED BY TRANSPLANT: ICD-10-CM

## 2017-05-09 DIAGNOSIS — Z94.0 KIDNEY REPLACED BY TRANSPLANT: Primary | ICD-10-CM

## 2017-05-09 DIAGNOSIS — D63.1 ANEMIA IN CHRONIC RENAL DISEASE: ICD-10-CM

## 2017-05-09 DIAGNOSIS — Z48.298 AFTERCARE FOLLOWING ORGAN TRANSPLANT: ICD-10-CM

## 2017-05-09 LAB
ANION GAP SERPL CALCULATED.3IONS-SCNC: 11 MMOL/L (ref 3–14)
BUN SERPL-MCNC: 44 MG/DL (ref 7–30)
CALCIUM SERPL-MCNC: 9 MG/DL (ref 8.5–10.1)
CHLORIDE SERPL-SCNC: 115 MMOL/L (ref 94–109)
CO2 SERPL-SCNC: 19 MMOL/L (ref 20–32)
CREAT SERPL-MCNC: 3.24 MG/DL (ref 0.52–1.04)
ERYTHROCYTE [DISTWIDTH] IN BLOOD BY AUTOMATED COUNT: 15 % (ref 10–15)
FERRITIN SERPL-MCNC: 279 NG/ML (ref 12–150)
GFR SERPL CREATININE-BSD FRML MDRD: 16 ML/MIN/1.7M2
GLUCOSE SERPL-MCNC: 93 MG/DL (ref 70–99)
HCT VFR BLD AUTO: 23.8 % (ref 35–47)
HGB BLD-MCNC: 7.6 G/DL (ref 11.7–15.7)
IRON SATN MFR SERPL: 25 % (ref 15–46)
IRON SERPL-MCNC: 54 UG/DL (ref 35–180)
MAGNESIUM SERPL-MCNC: 1.6 MG/DL (ref 1.6–2.3)
MCH RBC QN AUTO: 28.9 PG (ref 26.5–33)
MCHC RBC AUTO-ENTMCNC: 31.9 G/DL (ref 31.5–36.5)
MCV RBC AUTO: 91 FL (ref 78–100)
PHOSPHATE SERPL-MCNC: 3.1 MG/DL (ref 2.5–4.5)
PLATELET # BLD AUTO: 306 10E9/L (ref 150–450)
POTASSIUM SERPL-SCNC: 4.5 MMOL/L (ref 3.4–5.3)
RBC # BLD AUTO: 2.63 10E12/L (ref 3.8–5.2)
SODIUM SERPL-SCNC: 144 MMOL/L (ref 133–144)
TACROLIMUS BLD-MCNC: 4.3 UG/L (ref 5–15)
TIBC SERPL-MCNC: 212 UG/DL (ref 240–430)
TME LAST DOSE: ABNORMAL H
WBC # BLD AUTO: 6.7 10E9/L (ref 4–11)

## 2017-05-09 PROCEDURE — 84100 ASSAY OF PHOSPHORUS: CPT | Performed by: INTERNAL MEDICINE

## 2017-05-09 PROCEDURE — 83540 ASSAY OF IRON: CPT | Performed by: INTERNAL MEDICINE

## 2017-05-09 PROCEDURE — 83550 IRON BINDING TEST: CPT | Performed by: INTERNAL MEDICINE

## 2017-05-09 PROCEDURE — 83735 ASSAY OF MAGNESIUM: CPT | Performed by: INTERNAL MEDICINE

## 2017-05-09 PROCEDURE — 85027 COMPLETE CBC AUTOMATED: CPT | Performed by: INTERNAL MEDICINE

## 2017-05-09 PROCEDURE — 63400005 ZZH RX 634: Mod: ZF | Performed by: INTERNAL MEDICINE

## 2017-05-09 PROCEDURE — 99213 OFFICE O/P EST LOW 20 MIN: CPT | Mod: 25

## 2017-05-09 PROCEDURE — 99213 OFFICE O/P EST LOW 20 MIN: CPT | Mod: ZF

## 2017-05-09 PROCEDURE — 82728 ASSAY OF FERRITIN: CPT | Performed by: INTERNAL MEDICINE

## 2017-05-09 PROCEDURE — 80197 ASSAY OF TACROLIMUS: CPT | Performed by: INTERNAL MEDICINE

## 2017-05-09 PROCEDURE — 96372 THER/PROPH/DIAG INJ SC/IM: CPT

## 2017-05-09 PROCEDURE — 80048 BASIC METABOLIC PNL TOTAL CA: CPT | Performed by: INTERNAL MEDICINE

## 2017-05-09 RX ORDER — TACROLIMUS 1 MG/1
3 CAPSULE ORAL 2 TIMES DAILY
Qty: 100 CAPSULE | Refills: 0 | Status: SHIPPED | OUTPATIENT
Start: 2017-05-09 | End: 2017-05-14

## 2017-05-09 RX ORDER — TACROLIMUS 0.5 MG/1
CAPSULE ORAL
Qty: 60 CAPSULE | Refills: 11 | Status: SHIPPED | OUTPATIENT
Start: 2017-05-09 | End: 2017-05-11

## 2017-05-09 RX ADMIN — DARBEPOETIN ALFA 25 MCG: 25 INJECTION, SOLUTION INTRAVENOUS; SUBCUTANEOUS at 09:27

## 2017-05-09 NOTE — MR AVS SNAPSHOT
After Visit Summary   5/9/2017    Rosibel Willingham    MRN: [id number]           Patient Information     Date Of Birth          1975        Visit Information        Provider Department      5/9/2017 8:00 AM Benjamin Beltran MD [facility]        Today's Diagnoses     Kidney replaced by transplant    -  1    Immunosuppressed status (H)        Anemia in chronic renal disease        Hypertension secondary to other renal disorders           Follow-ups after your visit        Your next 10 appointments already scheduled     May 11, 2017  7:00 AM CDT   (Arrive by 6:45 AM)   New Transplant Visit with UC SPEC INFUSION, UC 43 ATC   [facility] Special[facility] Procedure ([facility])    [address]  [address] [facility] [address] [phone]            May 11, 2017  8:00 AM CDT   (Arrive by 7:45 AM)   Return with Benjamin Beltran MD   [facility] Procedure ([facility])    [address]  [address] [facility] [place] [address] [phone]            May 15, 2017  2:00 PM CDT   (Arrive by 1:45 PM)   Kidney Post Op with Leanne Montelongo MD   [facility] Solid Organ Transplant ([facility])    [address]  [address] [facility] [place] [address] [phone]            May 22, 2017   Procedure with Leanne Montelongo MD   [facility] [place], Same Day Surgery (--)    [address] [address] [phone]            May 22, 2017  1:00 PM CDT   (Arrive by 12:30 PM)   Return Kidney Transplant with Benjamin Beltran MD   [facility] Nephrology ([facility])    [address]  3rd Floor  [facility] [address] [phone]            Jul 24, 2017  1:10 PM CDT   (Arrive by 12:40 PM)   Return Kidney Transplant with Benjamin Beltran MD   [facility] Nephrology ([facility])    [address]  [address] [facility] [address] [phone]              Who to contact     If you have questions or need follow up information about today's clinic visit or your schedule please contact [facility] SPECIALTY AND PROCEDURE directly at [phone].  Normal or non-critical lab and imaging results will be communicated to you by MyChart, letter or phone within 4 business days after the clinic has received the results. If you do not hear from us within 7 days, please contact the clinic through [employer] or phone. If you have a critical or abnormal lab result, we will notify you by phone as soon as possible.  Submit refill requests through [employer] or call your pharmacy and they will forward the refill request to us. Please allow 3 business days for your refill to be completed.          Additional Information About Your Visit        [employer] Information     [employer] gives you secure access to your electronic health record. If you see a primary care provider, you can also send messages to your care team and make appointments. If you have questions, please call your primary care clinic.  If you do not have a primary care provider, please call [phone] and they will assist you.        Care EveryWhere ID     This is your Care EveryWhere ID. This could be used by other organizations to access your [place] medical records  [id number]         Blood Pressure from Last 3 Encounters:   05/09/17 133/80   05/08/17 131/76   05/07/17 113/73    Weight from Last 3 Encounters:   05/08/17 48.6 kg (107 lb 3.2 oz)   05/07/17 48.7 kg (107 lb 4.8 oz)   05/05/17 49.1 kg (108 lb 3.2 oz)              Today, you had the following     No orders found for display       Primary Care Provider    Physician No Ref-Primary       No address on file        Thank you!     Thank you for choosing [facility] SPECIALTY AND PROCEDURE  for your care. Our goal is  always to provide you with excellent care. Hearing back from our patients is one way we can continue to improve our services. Please take a few minutes to complete the written survey that you may receive in the mail after your visit with us. Thank you!             Your Updated Medication List - Protect others around you: Learn how to safely use, store and throw away your medicines at www.disposemymeds.org.          This list is accurate as of: 5/9/17 10:29 AM.  Always use your most recent med list.                   Brand Name Dispense Instructions for use    acetaminophen 325 MG tablet    TYLENOL    100 tablet    Take 2 tablets (650 mg) by mouth every 4 hours as needed for mild pain or fever       amLODIPine 5 MG tablet    NORVASC    30 tablet    Take 1 tablet (5 mg) by mouth daily       cholecalciferol 1000 UNITS capsule    vitamin  -D    30 capsule    Take 1 capsule (1,000 Units) by mouth daily       magnesium oxide 400 MG tablet    MAG-OX    120 tablet    Take 1 tablet (400 mg) by mouth 2 times daily       mycophenolic acid 180 MG EC tablet    MYFORTIC - GENERIC EQUIVALENT    240 tablet    Take 3 tablets (540 mg) by mouth 2 times daily       nystatin 963423 unit/mL Susp suspension    MYCOSTATIN    240 mL    Swish and swallow 5 mLs (500,000 Units) in mouth 4 times daily       ondansetron 4 MG tablet    ZOFRAN    30 tablet    Take 1 tablet (4 mg) by mouth every 8 hours as needed for nausea       senna-docusate 8.6-50 MG per tablet    SENOKOT-S;PERICOLACE    100 tablet    Take 1-2 tablets by mouth daily as needed for constipation       sodium bicarbonate 650 MG tablet     60 tablet    Take 1 tablet (650 mg) by mouth 2 times daily       sulfamethoxazole-trimethoprim 400-80 MG per tablet    BACTRIM/SEPTRA    12 tablet    Take 1 tablet by mouth Every Mon, Wed, Fri Morning       * tacrolimus 0.5 MG capsule    PROGRAF - GENERIC EQUIVALENT    60 capsule    Take 1 capsule (0.5 mg) by mouth 2 times daily       * tacrolimus  1 MG capsule    PROGRAF - GENERIC EQUIVALENT     Take 2 capsules (2 mg) by mouth 2 times daily       valGANciclovir 450 MG tablet    VALCYTE    8 tablet    Take 1 tablet (450 mg) by mouth twice a week       * Notice:  This list has 2 medication(s) that are the same as other medications prescribed for you. Read the directions carefully, and ask your doctor or other care provider to review them with you.

## 2017-05-09 NOTE — PATIENT INSTRUCTIONS
Dear Rosibel Willingham    Thank you for choosing [facility] ([facility]) for your transplant cares.  The following information is a summary of our appointment as well as important reminders.      Please make sure your phone is available today because I will call to update you with your anti-rejection drug levels and possibly make changes to your anti-rejection dosages.    Additional information:   -Return to [facility] on Thursday 5/11.      We look forward in seeing you on your next appointment here at [facility].  Please don t hesitate to call us at [phone] to reschedule any of your appointments or to speak with one of the [facility] registered nurses.  It was a pleasure taking care of you today.    Sincerely,  Nelida Sloan RN  [facility]  Specialty Infusion & Procedure Center  [address] [place] [address]  [address]  Phone:  [phone]

## 2017-05-09 NOTE — PROGRESS NOTES
"Rosibel Willingham came to [facility] today for a lab and assess following a  donor kidney transplant on 2017.      Discharge date: 2017  Transplant coordinator: Chinyere Burnham RN  Phone number patient can be reached at: [phone]  Please call  Ian frank as well with Prograf dose: [phone]      Physical Assessment:  See physical assessment located under \"Document Flowsheets\".  Incision site: Dermabond, no signs of infection  Lines: N/A  Huang: N/A  OCTAVIO: 20 cc last night.   Urine clarity: clear, yellow  Hydration: trying to increase fluids. Drinking approximately 2 L/d.  Nutrition: Appetite continuing to improve. Had large dinner last night without nausea.    Last BM: 2 formed BM's.   Pain: 2/10 at incision site. Not taking pain medications.      Laboratory tests: Standard labs drawn. Drawn by RN in [facility].    Plan of care for today: Labs and assessment. Seen by Nephrology. Arenesp given sub Q. Encouraged patient to increase oral intake and return to [facility] on Thursday for LBA.    Medication changes: N/A    Medications administered:  Administrations This Visit     darbepoetin ana maria-polysorbate (ARANESP) injection 25 mcg     Admin Date Action Dose Route Administered By             2017 Given 25 mcg Subcutaneous Nelida Sloan RN                              Patient education:    The following teaching topics were addressed: Importance of drinking 2L of non-caffeinated fluids daily, Incisional care, Signs/symptoms of infection, Good handwashing, Medications (purposes, doses and times of administration), Phone numbers to call with concenrs (Transplant coordinator, Unit [address]D and [facility]), 6D discharge check list, Plan of care and Drain care   Patient verbalized understanding and all questions answered.    Drug level:  4.3 level today reviewed with Dr Master Paul who gave orders to increase tacrolimus dose to 3 mg BID (7:30 AM/PM).  Patient was updated with this information and verbalized " understanding.    Last tacrolimus dose 7:30 PM last night, 2.5 mg twice daily.    Face to face time: 10 minutes    Discharge Plan    Pt will follow up with [facility] Thursday 5/11.  Discharge instructions reviewed with patient: YES  Patient/Representative verbalized understanding, all questions answered: YES    Discharged from unit at 0930 with whom:  to hotel.    Nelida Sloan RN

## 2017-05-09 NOTE — PROGRESS NOTES
Assessment and Plan:  1. DDKT - Patient had DGF, improving now, Cr 3.2 today from 4 yesterday. UA ok, urine Pr:Cr improved. Will push PO fluids over next couple days and have her return on 5/11 -> if Cr <3, will likely not pursue biopsy. If Cr 3 or greater, will do biopsy on Friday. She is holding ASA since Monday's dose.  2. Hypertension - Blood pressure optimal, no change  3. Hypovolemic - improved, will not give IVF's today, push PO fluids  4. Anemia - Hgb now in 7's, previously iron replete. Will repeat iron studies and give one time dose of Aranesp 25 mcg.  5. Renal Secondary Hyperparathyroidism - mildly elevated PTH at 149.  Patient has mild vitamin d insufficiency.  Recheck PTH at 3 months.  6. Vitamin D insufficiency - at 29.  On cholecalciferol 1000 units daily  7. Prophylaxis - EBV D+R+, CMV D+R+.  Valcyte for 3 months.  8. Nausea - improved, zofran prn  9. Hypomagnesemia - continue magnesium oxide 400 mg bid  10. Diarrhea - resolved  11. Thrush - Continue nystatin s/s qid    Follow up on Thursday with labs.    Assessment and plan was discussed with patient and she voiced her understanding and agreement.    Staffed with Dr. Beltran.    Master Paul  Nephrology Fellow  Pager: [phone]    Reason for Visit:  Ms. Willingham is here for hospital follow up following kidney transplant.    HPI:   Rosibel Willingham is a 41 year old female with ESKD from unknown etiology and is status post DDKT on 4/23/17.         Transplant Hx:       Tx: DDKT  Date: 4/23/17       Present Maintenance IS: Tacrolimus and Mycophenolate mofetil       Baseline Creatinine: TBD       Recent DSA: No  PRA ():     Class I:      Class II:        Biopsy: No    She feels well today, appetite improved, nausea improved, tolerating PO well. She has no complaints today. She is struggling a bit being away from her kids for so long. Cr is improved today.    Home BP: 130's/80's      ROS:   A comprehensive review of systems was obtained and negative, except as  noted in the HPI or PMH.    Active Medical Problems:  Patient Active Problem List   Diagnosis     Anemia in chronic renal disease     Secondary renal hyperparathyroidism (H)     Hypertension secondary to other renal disorders     Kidney replaced by transplant     Immunosuppressed status (H)     Aftercare following organ transplant     Vitamin D deficiency     Metabolic acidosis     Hypomagnesemia     Orthostatic hypotension     Hypokalemia     Acidosis     Personal Hx:  Social History     Social History     Marital status:      Spouse name: N/A     Number of children: N/A     Years of education: N/A     Occupational History     Not on file.     Social History Main Topics     Smoking status: Former Smoker     Packs/day: 0.50     Years: 2.00     Types: Cigarettes     Quit date: 9/26/1998     Smokeless tobacco: Never Used     Alcohol use 1.2 oz/week     2 Standard drinks or equivalent per week     Drug use: No     Sexual activity: Not on file     Other Topics Concern     Not on file     Social History Narrative     Allergies:  Allergies   Allergen Reactions     Erythromycin Hives       Medications:  Current Outpatient Prescriptions   Medication Sig Dispense Refill     amLODIPine (NORVASC) 5 MG tablet Take 1 tablet (5 mg) by mouth daily 30 tablet 3     nystatin (MYCOSTATIN) 883458 unit/mL SUSP suspension Swish and swallow 5 mLs (500,000 Units) in mouth 4 times daily 240 mL 0     tacrolimus (PROGRAF - GENERIC EQUIVALENT) 1 MG capsule Take 2 capsules (2 mg) by mouth 2 times daily       mycophenolic acid (MYFORTIC - GENERIC EQUIVALENT) 180 MG EC tablet Take 3 tablets (540 mg) by mouth 2 times daily 240 tablet 11     sodium bicarbonate 650 MG tablet Take 1 tablet (650 mg) by mouth 2 times daily 60 tablet 0     cholecalciferol (VITAMIN  -D) 1000 UNITS capsule Take 1 capsule (1,000 Units) by mouth daily 30 capsule 11     magnesium oxide (MAG-OX) 400 MG tablet Take 1 tablet (400 mg) by mouth 2 times daily 120 tablet 3      ondansetron (ZOFRAN) 4 MG tablet Take 1 tablet (4 mg) by mouth every 8 hours as needed for nausea 30 tablet 0     tacrolimus (PROGRAF - GENERIC EQUIVALENT) 0.5 MG capsule Take 1 capsule (0.5 mg) by mouth 2 times daily (Patient taking differently: Take 0.5 mg by mouth 2 times daily ) 60 capsule 11     sulfamethoxazole-trimethoprim (BACTRIM/SEPTRA) 400-80 MG per tablet Take 1 tablet by mouth Every Mon, Wed, Fri Morning 12 tablet 11     valGANciclovir (VALCYTE) 450 MG tablet Take 1 tablet (450 mg) by mouth twice a week 8 tablet 2     acetaminophen (TYLENOL) 325 MG tablet Take 2 tablets (650 mg) by mouth every 4 hours as needed for mild pain or fever 100 tablet 0     senna-docusate (SENOKOT-S;PERICOLACE) 8.6-50 MG per tablet Take 1-2 tablets by mouth daily as needed for constipation (Patient not taking: Reported on 5/1/2017) 100 tablet 0     Vitals:  AF, /80, P88, Sat 99%    Exam:   GENERAL APPEARANCE: alert and no distress  HENT: whitish tongue  LYMPHATICS: no cervical or supraclavicular nodes  RESP: lungs clear to auscultation - no rales, rhonchi or wheezes  CV: regular rhythm, normal rate, no rub, no murmur  EDEMA: No LE edema bilaterally  ABDOMEN: soft, nondistended, nontender, bowel sounds normal  MS: extremities normal - no gross deformities noted, no evidence of inflammation in joints, no muscle tenderness  SKIN: no rash  TX KIDNEY: normal, one small spot where glue rubbed off     Results:   Reviewed    Patient was seen and evaluated by me, Benjamin Beltran MD. I have reviewed the note and agree with the the plan of care as documented by the fellow.  Will adjust tac when available

## 2017-05-09 NOTE — MR AVS SNAPSHOT
After Visit Summary   5/9/2017    Rosibel Willingham    MRN: [id number]           Patient Information     Date Of Birth          1975        Visit Information        Provider Department      5/9/2017 7:00 AM UC 41 ATC; UC SPEC INFUSION [facility] Special[facility] and Procedure        Today's Diagnoses     Aftercare following organ transplant        Kidney replaced by transplant          Care Instructions    Dear Rosibel Willingham    Thank you for choosing [facility] ([facility]) for your transplant cares.  The following information is a summary of our appointment as well as important reminders.      Please make sure your phone is available today because I will call to update you with your anti-rejection drug levels and possibly make changes to your anti-rejection dosages.    Additional information:   -Return to [facility] on Thursday 5/11.      We look forward in seeing you on your next appointment here at [facility].  Please don t hesitate to call us at [phone] to reschedule any of your appointments or to speak with one of the [facility] registered nurses.  It was a pleasure taking care of you today.    Sincerely,  Nelida Sloan, RN  [facility]  Specialty Infusion & Procedure Center  [address] [place] [address]  Phone:  [phone]          Follow-ups after your visit        Your next 10 appointments already scheduled     May 15, 2017  2:00 PM CDT   (Arrive by 1:45 PM)   Kidney Post Op with Leanne Montelongo MD   [facility] Solid Organ Transplant ([facility])    [address] [address] [phone]            May 22, 2017   Procedure with Leanne Montelongo MD   [facility], [place], Same Day Surgery (--)    [address] [facility] [address] [phone]            May 22, 2017  1:00 PM CDT   (Arrive by 12:30 PM)   Return Kidney Transplant  with Benjamin Beltran MD   [facility] Nephrology ([facility])    [address] [address] [phone]            Jul 24, 2017  1:10 PM CDT   (Arrive by 12:40 PM)   Return Kidney Transplant with Benjamin Beltran MD   [facility] Nephrology ([facility])    [address] [address] [phone]              Who to contact     If you have questions or need follow up information about today's clinic visit or your schedule please contact [facility] SPECIALTY AND PROCEDURE directly at [phone].  Normal or non-critical lab and imaging results will be communicated to you by [employer], letter or phone within 4 business days after the clinic has received the results. If you do not hear from us within 7 days, please contact the clinic through [employer] or phone. If you have a critical or abnormal lab result, we will notify you by phone as soon as possible.  Submit refill requests through [employer] or call your pharmacy and they will forward the refill request to us. Please allow 3 business days for your refill to be completed.          Additional Information About Your Visit        [employer] Information     [employer] gives you secure access to your electronic health record. If you see a primary care provider, you can also send messages to your care team and make appointments. If you have questions, please call your primary care clinic.  If you do not have a primary care provider, please call [phone] and they will assist you.        Care EveryWhere ID     This is your Care EveryWhere ID. This could be used by other organizations to access your [place] medical records  [id number]        Your Vitals Were     Pulse Temperature Pulse Oximetry             88 98.2  F (36.8  C) 99%          Blood Pressure from Last 3 Encounters:   05/09/17 133/80   05/08/17 131/76   05/07/17 113/73    Weight  from Last 3 Encounters:   05/08/17 48.6 kg (107 lb 3.2 oz)   05/07/17 48.7 kg (107 lb 4.8 oz)   05/05/17 49.1 kg (108 lb 3.2 oz)              We Performed the Following     Basic metabolic panel     CBC with platelets     Ferritin     Iron and iron binding capacity     Magnesium     Phosphorus     Tacrolimus level        Primary Care Provider    Physician No Ref-Primary       No address on file        Thank you!     Thank you for choosing [facility] SPECIALTY AND PROCEDURE  for your care. Our goal is always to provide you with excellent care. Hearing back from our patients is one way we can continue to improve our services. Please take a few minutes to complete the written survey that you may receive in the mail after your visit with us. Thank you!             Your Updated Medication List - Protect others around you: Learn how to safely use, store and throw away your medicines at www.disposemymeds.org.          This list is accurate as of: 5/9/17  9:23 AM.  Always use your most recent med list.                   Brand Name Dispense Instructions for use    acetaminophen 325 MG tablet    TYLENOL    100 tablet    Take 2 tablets (650 mg) by mouth every 4 hours as needed for mild pain or fever       amLODIPine 5 MG tablet    NORVASC    30 tablet    Take 1 tablet (5 mg) by mouth daily       cholecalciferol 1000 UNITS capsule    vitamin  -D    30 capsule    Take 1 capsule (1,000 Units) by mouth daily       magnesium oxide 400 MG tablet    MAG-OX    120 tablet    Take 1 tablet (400 mg) by mouth 2 times daily       mycophenolic acid 180 MG EC tablet    MYFORTIC - GENERIC EQUIVALENT    240 tablet    Take 3 tablets (540 mg) by mouth 2 times daily       nystatin 412645 unit/mL Susp suspension    MYCOSTATIN    240 mL    Swish and swallow 5 mLs (500,000 Units) in mouth 4 times daily       ondansetron 4 MG tablet    ZOFRAN    30 tablet    Take 1 tablet (4 mg) by mouth every 8 hours as needed for nausea        senna-docusate 8.6-50 MG per tablet    SENOKOT-S;PERICOLACE    100 tablet    Take 1-2 tablets by mouth daily as needed for constipation       sodium bicarbonate 650 MG tablet     60 tablet    Take 1 tablet (650 mg) by mouth 2 times daily       sulfamethoxazole-trimethoprim 400-80 MG per tablet    BACTRIM/SEPTRA    12 tablet    Take 1 tablet by mouth Every Mon, Wed, Fri Morning       * tacrolimus 0.5 MG capsule    PROGRAF - GENERIC EQUIVALENT    60 capsule    Take 1 capsule (0.5 mg) by mouth 2 times daily       * tacrolimus 1 MG capsule    PROGRAF - GENERIC EQUIVALENT     Take 2 capsules (2 mg) by mouth 2 times daily       valGANciclovir 450 MG tablet    VALCYTE    8 tablet    Take 1 tablet (450 mg) by mouth twice a week       * Notice:  This list has 2 medication(s) that are the same as other medications prescribed for you. Read the directions carefully, and ask your doctor or other care provider to review them with you.

## 2017-05-11 ENCOUNTER — OFFICE VISIT (OUTPATIENT)
Dept: INFUSION THERAPY | Facility: CLINIC | Age: 42
End: 2017-05-11
Attending: INTERNAL MEDICINE
Payer: COMMERCIAL

## 2017-05-11 ENCOUNTER — TELEPHONE (OUTPATIENT)
Dept: TRANSPLANT | Facility: CLINIC | Age: 42
End: 2017-05-11

## 2017-05-11 DIAGNOSIS — Z94.0 KIDNEY REPLACED BY TRANSPLANT: ICD-10-CM

## 2017-05-11 DIAGNOSIS — D84.9 IMMUNOSUPPRESSED STATUS (H): ICD-10-CM

## 2017-05-11 DIAGNOSIS — D63.1 ANEMIA IN CHRONIC RENAL DISEASE: ICD-10-CM

## 2017-05-11 DIAGNOSIS — E87.20 METABOLIC ACIDOSIS: ICD-10-CM

## 2017-05-11 DIAGNOSIS — Z94.0 KIDNEY REPLACED BY TRANSPLANT: Primary | ICD-10-CM

## 2017-05-11 DIAGNOSIS — N25.81 SECONDARY RENAL HYPERPARATHYROIDISM (H): ICD-10-CM

## 2017-05-11 DIAGNOSIS — E83.42 HYPOMAGNESEMIA: ICD-10-CM

## 2017-05-11 DIAGNOSIS — N18.9 ANEMIA IN CHRONIC RENAL DISEASE: ICD-10-CM

## 2017-05-11 DIAGNOSIS — I15.1 HYPERTENSION SECONDARY TO OTHER RENAL DISORDERS: ICD-10-CM

## 2017-05-11 DIAGNOSIS — Z48.298 AFTERCARE FOLLOWING ORGAN TRANSPLANT: ICD-10-CM

## 2017-05-11 PROBLEM — I95.1 ORTHOSTATIC HYPOTENSION: Status: RESOLVED | Noted: 2017-05-07 | Resolved: 2017-05-11

## 2017-05-11 PROBLEM — E87.6 HYPOKALEMIA: Status: RESOLVED | Noted: 2017-05-07 | Resolved: 2017-05-11

## 2017-05-11 LAB
ANION GAP SERPL CALCULATED.3IONS-SCNC: 8 MMOL/L (ref 3–14)
BASOPHILS # BLD AUTO: 0.1 10E9/L (ref 0–0.2)
BASOPHILS NFR BLD AUTO: 1.1 %
BUN SERPL-MCNC: 32 MG/DL (ref 7–30)
CALCIUM SERPL-MCNC: 8.7 MG/DL (ref 8.5–10.1)
CHLORIDE SERPL-SCNC: 116 MMOL/L (ref 94–109)
CO2 SERPL-SCNC: 20 MMOL/L (ref 20–32)
CREAT SERPL-MCNC: 2.77 MG/DL (ref 0.52–1.04)
DIFFERENTIAL METHOD BLD: ABNORMAL
EOSINOPHIL # BLD AUTO: 0.1 10E9/L (ref 0–0.7)
EOSINOPHIL NFR BLD AUTO: 2.3 %
ERYTHROCYTE [DISTWIDTH] IN BLOOD BY AUTOMATED COUNT: 15 % (ref 10–15)
GFR SERPL CREATININE-BSD FRML MDRD: 19 ML/MIN/1.7M2
GLUCOSE SERPL-MCNC: 85 MG/DL (ref 70–99)
HCT VFR BLD AUTO: 24.8 % (ref 35–47)
HGB BLD-MCNC: 8.1 G/DL (ref 11.7–15.7)
IMM GRANULOCYTES # BLD: 0 10E9/L (ref 0–0.4)
IMM GRANULOCYTES NFR BLD: 0.2 %
LYMPHOCYTES # BLD AUTO: 0.4 10E9/L (ref 0.8–5.3)
LYMPHOCYTES NFR BLD AUTO: 7.1 %
MAGNESIUM SERPL-MCNC: 1.5 MG/DL (ref 1.6–2.3)
MCH RBC QN AUTO: 29.1 PG (ref 26.5–33)
MCHC RBC AUTO-ENTMCNC: 32.7 G/DL (ref 31.5–36.5)
MCV RBC AUTO: 89 FL (ref 78–100)
MONOCYTES # BLD AUTO: 0.2 10E9/L (ref 0–1.3)
MONOCYTES NFR BLD AUTO: 3.5 %
NEUTROPHILS # BLD AUTO: 4.9 10E9/L (ref 1.6–8.3)
NEUTROPHILS NFR BLD AUTO: 85.8 %
NRBC # BLD AUTO: 0 10*3/UL
NRBC BLD AUTO-RTO: 0 /100
PHOSPHATE SERPL-MCNC: 2.7 MG/DL (ref 2.5–4.5)
PLATELET # BLD AUTO: 307 10E9/L (ref 150–450)
POTASSIUM SERPL-SCNC: 4.2 MMOL/L (ref 3.4–5.3)
RBC # BLD AUTO: 2.78 10E12/L (ref 3.8–5.2)
SODIUM SERPL-SCNC: 144 MMOL/L (ref 133–144)
TACROLIMUS BLD-MCNC: 7 UG/L (ref 5–15)
TME LAST DOSE: NORMAL H
WBC # BLD AUTO: 5.5 10E9/L (ref 4–11)

## 2017-05-11 PROCEDURE — 80048 BASIC METABOLIC PNL TOTAL CA: CPT | Performed by: INTERNAL MEDICINE

## 2017-05-11 PROCEDURE — 84100 ASSAY OF PHOSPHORUS: CPT | Performed by: INTERNAL MEDICINE

## 2017-05-11 PROCEDURE — 99213 OFFICE O/P EST LOW 20 MIN: CPT | Mod: ZF

## 2017-05-11 PROCEDURE — 83735 ASSAY OF MAGNESIUM: CPT | Performed by: INTERNAL MEDICINE

## 2017-05-11 PROCEDURE — 85004 AUTOMATED DIFF WBC COUNT: CPT | Performed by: INTERNAL MEDICINE

## 2017-05-11 PROCEDURE — 85027 COMPLETE CBC AUTOMATED: CPT | Performed by: INTERNAL MEDICINE

## 2017-05-11 PROCEDURE — 80197 ASSAY OF TACROLIMUS: CPT | Performed by: INTERNAL MEDICINE

## 2017-05-11 RX ORDER — TACROLIMUS 1 MG/1
1 CAPSULE, GELATIN COATED ORAL 2 TIMES DAILY
Qty: 180 CAPSULE | Refills: 11 | Status: SHIPPED | OUTPATIENT
Start: 2017-05-11 | End: 2017-05-22

## 2017-05-11 RX ORDER — TACROLIMUS 0.5 MG/1
0.5 CAPSULE ORAL EVERY 12 HOURS
Qty: 60 CAPSULE | Refills: 11 | Status: SHIPPED | OUTPATIENT
Start: 2017-05-11 | End: 2017-05-22

## 2017-05-11 NOTE — MR AVS SNAPSHOT
After Visit Summary   5/11/2017    Rosibel Willingham    MRN: [id number]           Patient Information     Date Of Birth          1975        Visit Information        Provider Department      5/11/2017 7:00 AM UC 43 ATC; UC SPEC INFUSION [facility] Specialty and Procedure        Today's Diagnoses     Aftercare following organ transplant        Kidney replaced by transplant          Care Instructions    Dear Rosibel Willingham    Thank you for choosing [facility] Specialty Infusion [facility] ([facility]) for your infusion.  The following information is a summary of our appointment as well as important reminders.      Please make sure your phone is available today because I will call to update you with your anti-rejection drug levels and possibly make changes to your anti-rejection dosages.      We look forward in seeing you on your next appointment here at [facility].  Please don t hesitate to call us at [phone] to reschedule any of your appointments or to speak with one of the [facility] registered nurses.  It was a pleasure taking care of you today.    Sincerely,  Nelida Sloan RN  [facility]  Specialty Infusion & Procedure Center  [address] [place] [address]  [address]  Phone:  [phone]          Follow-ups after your visit        Your next 10 appointments already scheduled     May 15, 2017  2:00 PM CDT   (Arrive by 1:45 PM)   Kidney Post Op with Leanne Montelongo MD   [facility] Solid Organ Transplant ([facility])    [address] [address] [phone]            May 22, 2017   Procedure with Leanne Montelongo MD   [facility] [place], Same Day Surgery (--)    [address] [facility] [address] [phone]            May 22, 2017  1:00 PM CDT   (Arrive by 12:30 PM)   Return Kidney Transplant with Benjamin Beltran MD   [facility] Nephrology ([facility])    [address] [id number] [phone]            Jul 24, 2017  1:10 PM CDT   (Arrive by 12:40 PM)   Return Kidney Transplant with Benjamin Beltran MD   [facility] Nephrology ([facility])    [address] [place] [id number] [phone]              Who to contact     If you have questions or need follow up information about today's clinic visit or your schedule please contact [facility] SPECIALTY AND PROCEDURE directly at [phone].  Normal or non-critical lab and imaging results will be communicated to you by MyChart, letter or phone within 4 business days after the clinic has received the results. If you do not hear from us within 7 days, please contact the clinic through MedaNexthart or phone. If you have a critical or abnormal lab result, we will notify you by phone as soon as possible.  Submit refill requests through Hadapt or call your pharmacy and they will forward the refill request to us. Please allow 3 business days for your refill to be completed.          Additional Information About Your Visit        MedaNexthart Information     H[employer] gives you secure access to your electronic health record. If you see a primary care provider, you can also send messages to your care team and make appointments. If you have questions, please call your primary care clinic.  If you do not have a primary care provider, please call [phone] and they will assist you.        Care EveryWhere ID     This is your Care EveryWhere ID. This could be used by other organizations to access your [facility] medical records  [id number]         Blood Pressure from Last 3 Encounters:   05/11/17 (P) 120/76   05/09/17 133/80   05/08/17 131/76    Weight from Last 3 Encounters:   05/08/17 48.6 kg (107 lb 3.2 oz)   05/07/17 48.7 kg (107 lb 4.8 oz)   05/05/17 49.1 kg (108 lb 3.2 oz)              We Performed the  Following     Basic metabolic panel     CBC with platelets     Magnesium     Phosphorus     Tacrolimus level          Today's Medication Changes          These changes are accurate as of: 5/11/17  4:07 PM.  If you have any questions, ask your nurse or doctor.               These medicines have changed or have updated prescriptions.        Dose/Directions    * tacrolimus 1 MG capsule   Commonly known as:  PROGRAF - GENERIC EQUIVALENT   This may have changed:    - Another medication with the same name was added. Make sure you understand how and when to take each.  - Another medication with the same name was changed. Make sure you understand how and when to take each.   Used for:  Kidney replaced by transplant, Immunosuppression (H)        Dose:  3 mg   Take 3 capsules (3 mg) by mouth 2 times daily   Quantity:  100 capsule   Refills:  0       * tacrolimus capsule   This may have changed:  You were already taking a medication with the same name, and this prescription was added. Make sure you understand how and when to take each.   Used for:  Kidney replaced by transplant   Changed by:  Benjamin Beltran MD        Dose:  1 mg   Take 1 capsule (1 mg) by mouth 2 times daily   Quantity:  180 capsule   Refills:  11       * tacrolimus 0.5 MG capsule   Commonly known as:  PROGRAF - GENERIC EQUIVALENT   This may have changed:    - how much to take  - how to take this  - when to take this   Used for:  Kidney replaced by transplant        Dose:  0.5 mg   Take 1 capsule (0.5 mg) by mouth every 12 hours Use for dose adjustments.   Quantity:  60 capsule   Refills:  11       * Notice:  This list has 3 medication(s) that are the same as other medications prescribed for you. Read the directions carefully, and ask your doctor or other care provider to review them with you.         Where to get your medicines      These medications were sent to [facility] - [place], [place] - [address] AT Portal to Registered  [facility]  [address] [facility] [address]     Phone:  [phone]     tacrolimus capsule         These medications were sent to [place], [place] - [address]  [address], [facility] [address]    Hours:  TRANSPLANT PHONE NUMBER [phone] Phone:  [phone]     tacrolimus 0.5 MG capsule                Primary Care Provider    Physician No Ref-Primary       No address on file        Thank you!     Thank you for choosing [facility] SPECIALTY AND PROCEDURE  for your care. Our goal is always to provide you with excellent care. Hearing back from our patients is one way we can continue to improve our services. Please take a few minutes to complete the written survey that you may receive in the mail after your visit with us. Thank you!             Your Updated Medication List - Protect others around you: Learn how to safely use, store and throw away your medicines at www.disposemymeds.org.          This list is accurate as of: 5/11/17  4:07 PM.  Always use your most recent med list.                   Brand Name Dispense Instructions for use    acetaminophen 325 MG tablet    TYLENOL    100 tablet    Take 2 tablets (650 mg) by mouth every 4 hours as needed for mild pain or fever       amLODIPine 5 MG tablet    NORVASC    30 tablet    Take 1 tablet (5 mg) by mouth daily       cholecalciferol 1000 UNITS capsule    vitamin  -D    30 capsule    Take 1 capsule (1,000 Units) by mouth daily       magnesium oxide 400 MG tablet    MAG-OX    120 tablet    Take 1 tablet (400 mg) by mouth 2 times daily       mycophenolic acid 180 MG EC tablet    MYFORTIC - GENERIC EQUIVALENT    240 tablet    Take 3 tablets (540 mg) by mouth 2 times daily       nystatin 699783 unit/mL Susp suspension    MYCOSTATIN    240 mL    Swish and swallow 5 mLs (500,000 Units) in mouth 4 times daily       ondansetron 4 MG tablet    ZOFRAN    30 tablet     Take 1 tablet (4 mg) by mouth every 8 hours as needed for nausea       senna-docusate 8.6-50 MG per tablet    SENOKOT-S;PERICOLACE    100 tablet    Take 1-2 tablets by mouth daily as needed for constipation       sodium bicarbonate 650 MG tablet     60 tablet    Take 1 tablet (650 mg) by mouth 2 times daily       sulfamethoxazole-trimethoprim 400-80 MG per tablet    BACTRIM/SEPTRA    12 tablet    Take 1 tablet by mouth Every Mon, Wed, Fri Morning       * tacrolimus 1 MG capsule    PROGRAF - GENERIC EQUIVALENT    100 capsule    Take 3 capsules (3 mg) by mouth 2 times daily       * tacrolimus capsule     180 capsule    Take 1 capsule (1 mg) by mouth 2 times daily       * tacrolimus 0.5 MG capsule    PROGRAF - GENERIC EQUIVALENT    60 capsule    Take 1 capsule (0.5 mg) by mouth every 12 hours Use for dose adjustments.       valGANciclovir 450 MG tablet    VALCYTE    8 tablet    Take 1 tablet (450 mg) by mouth twice a week       * Notice:  This list has 3 medication(s) that are the same as other medications prescribed for you. Read the directions carefully, and ask your doctor or other care provider to review them with you.

## 2017-05-11 NOTE — PROGRESS NOTES
Assessment and Plan:  1. DDKT - Patient had DGF, improving now, Cr 2.7 from 3.2, will hold off on biopsy for now but continue to trend Cr. If tac level therapeutic in 8-10 range and Cr improving despite this, then we are comfortable. If Cr rises or does not fall with therapeutic tac level, then will require biopsy. Will trend. No DSA.  -Drain 30-40cc/day x3 days.   -Will discuss logistics with surgeons and providers in [place] about timing of stent removal, drain removal, going back to [place], and whether she can be followed or have any follow-up care by transplant team in [place] moving forward.  2. Hypertension - Blood pressure optimal, no change  3. Hypovolemic - improved, will not give IVF's today, continue to push PO fluids  4. Anemia - Hgb 8.1 from 7.6, iron replete, S/P Aranesp 25mcg on 5/9  5. Renal Secondary Hyperparathyroidism - mildly elevated PTH at 149.  Patient has mild vitamin d insufficiency.  Recheck PTH at 3 months.  6. Vitamin D insufficiency - at 29.  On cholecalciferol 1000 units daily  7. Prophylaxis - EBV D+R+, CMV D+R+.  Valcyte for 3 months, Bactrim indefinitely. Will need dose adjustment with further improvement in Cr  8. Nausea - improved, zofran prn  9. Hypomagnesemia - continue magnesium oxide 400 mg bid  10. Thrush - Continue nystatin s/s qid    Follow up on Thursday with labs.    Assessment and plan was discussed with patient and she voiced her understanding and agreement.    Staffed with Dr. Beltran.    Master Paul  Nephrology Fellow  Pager: [phone]    Reason for Visit:  Ms. Willingham is here for hospital follow up following kidney transplant.    HPI:   Rosibel Willingham is a 41 year old female with ESKD from unknown etiology and is status post DDKT on 4/23/17.         Transplant Hx:       Tx: DDKT  Date: 4/23/17       Present Maintenance IS: Tacrolimus and Mycophenolate mofetil       Baseline Creatinine: TBD       Recent DSA: No  PRA ():     Class I:      Class II:         Biopsy: No    She feels well today, nausea improving, appetite better, tolerating 2.2-2.5L fluid intake daily, pain controlled, stooling, no pain or trouble with urination. Drain is 30-40 cc/day for last 3-4 days. She is wondering about logistics of going home and who she can follow with in [place] and when.    Home BP: 130's/80's      ROS:   A comprehensive review of systems was obtained and negative, except as noted in the HPI or PMH.    Active Medical Problems:  Patient Active Problem List   Diagnosis     Anemia in chronic renal disease     Secondary renal hyperparathyroidism (H)     Hypertension secondary to other renal disorders     Kidney replaced by transplant     Immunosuppressed status (H)     Vitamin D deficiency     Metabolic acidosis     Hypomagnesemia     Personal Hx:  Social History     Social History     Marital status:      Spouse name: N/A     Number of children: N/A     Years of education: N/A     Occupational History     Not on file.     Social History Main Topics     Smoking status: Former Smoker     Packs/day: 0.50     Years: 2.00     Types: Cigarettes     Quit date: 9/26/1998     Smokeless tobacco: Never Used     Alcohol use 1.2 oz/week     2 Standard drinks or equivalent per week     Drug use: No     Sexual activity: Not on file     Other Topics Concern     Not on file     Social History Narrative     Allergies:  Allergies   Allergen Reactions     Erythromycin Hives       Medications:  Current Outpatient Prescriptions   Medication Sig Dispense Refill     tacrolimus (PROGRAF - GENERIC EQUIVALENT) 0.5 MG capsule Use for dose adjustments. 60 capsule 11     tacrolimus (PROGRAF - GENERIC EQUIVALENT) 1 MG capsule Take 3 capsules (3 mg) by mouth 2 times daily 100 capsule 0     amLODIPine (NORVASC) 5 MG tablet Take 1 tablet (5 mg) by mouth daily 30 tablet 3     nystatin (MYCOSTATIN) 946372 unit/mL SUSP suspension Swish and swallow 5 mLs (500,000 Units) in mouth 4 times daily 240  mL 0     mycophenolic acid (MYFORTIC - GENERIC EQUIVALENT) 180 MG EC tablet Take 3 tablets (540 mg) by mouth 2 times daily 240 tablet 11     sodium bicarbonate 650 MG tablet Take 1 tablet (650 mg) by mouth 2 times daily 60 tablet 0     cholecalciferol (VITAMIN  -D) 1000 UNITS capsule Take 1 capsule (1,000 Units) by mouth daily 30 capsule 11     magnesium oxide (MAG-OX) 400 MG tablet Take 1 tablet (400 mg) by mouth 2 times daily 120 tablet 3     ondansetron (ZOFRAN) 4 MG tablet Take 1 tablet (4 mg) by mouth every 8 hours as needed for nausea 30 tablet 0     sulfamethoxazole-trimethoprim (BACTRIM/SEPTRA) 400-80 MG per tablet Take 1 tablet by mouth Every Mon, Wed, Fri Morning 12 tablet 11     valGANciclovir (VALCYTE) 450 MG tablet Take 1 tablet (450 mg) by mouth twice a week 8 tablet 2     acetaminophen (TYLENOL) 325 MG tablet Take 2 tablets (650 mg) by mouth every 4 hours as needed for mild pain or fever 100 tablet 0     senna-docusate (SENOKOT-S;PERICOLACE) 8.6-50 MG per tablet Take 1-2 tablets by mouth daily as needed for constipation (Patient not taking: Reported on 5/1/2017) 100 tablet 0     Vitals:  AF, /76, P86, Sat 100%    Exam:   GENERAL APPEARANCE: alert and no distress  HENT: whitish tongue improving  LYMPHATICS: no cervical or supraclavicular nodes  RESP: lungs clear to auscultation - no rales, rhonchi or wheezes  CV: regular rhythm, normal rate, no rub, no murmur  EDEMA: No LE edema bilaterally  ABDOMEN: soft, nondistended, nontender, bowel sounds normal  MS: extremities normal - no gross deformities noted, no evidence of inflammation in joints, no muscle tenderness  SKIN: no rash  TX KIDNEY: normal, healing well    Results:   Reviewed    Patient was seen and evaluated by me, Benjamin Beltran MD. I have reviewed the note and agree with the the plan of care as documented by the fellow.

## 2017-05-11 NOTE — PROGRESS NOTES
"Rosibel Willingham came to [facility] today for a lab and assess following a diseased donor kidney transplant on 4/23/2017.      Discharge date: 4/27/2017  Transplant coordinator: Chinyere Burnham  Phone number patient can be reached at: [phone]      Physical Assessment:  See physical assessment located under \"Document Flowsheets\".  Incision site: Dermabond, no signs of infection  Lines: N/A   Huang: N/A  OCTAVIO: 35 cc in last 24 hours  Urine clarity: clear, yellow  Hydration: patient increasing fluids. Intake of 2.3 L 5/9 and 2.1 L 5/10  Nutrition: decreased, continues to have nausea in AM.    Last BM: 2 BM's yesterday  Pain: 2/10 consistent pain to incision site. Taking Tylenol PRN for pain.     Labs drawn by [facility] staff Yes    Plan of care for today: Labs and assessment, patient seen by Nephrology. Patient to have labs drawn tomorrow morning and appointment with transplant surgeon Monday 5/15. Will determine at surgery appt Monday if OCTAVIO can be discontinued. Patient discharged from services at [facility]. Home care notified.     [facility] [phone]  Dr. Sheriff, nephrologist with [facility] [phone] phone  [phone].     Above info given to Sebastian Powell, transplant coordinator whom will follow up with patient and coordinate continuity of care.      Patient education:    The following teaching topics were addressed: Importance of drinking 2L of non-caffeinated fluids daily, Incisional care, Signs/symptoms of infection, Good handwashing, Medications (purposes, doses and times of administration), Phone numbers to call with concenrs (Transplant coordinator, [address] and [facility]), Plan of care and Drain care   Patient verbalized understanding and all questions answered.    Drug level:  TAC level today reviewed with Dr Paul who gave orders to instruct pt to increase her dose to 3.5 mg po every 12 hours.  Patient was updated with this information and verbalized understanding.    Last tacrolimus dose " at 7:30 PM 5/10.    Face to face time: 10 minutes    Discharge Plan    Pt will follow up with coordinator regarding any concerns or questions. Pt will have [facility] services for lab draws starting Friday and then Monday, Wednesday, and Friday until she returns home to [place]. Pt will see surgery for follow up on Monday.   Discharge instructions reviewed with patient: YES  Patient/Representative verbalized understanding, all questions answered: YES    Discharged from unit at 0840 with whom:  to [facility].    Nelida Sloan RN

## 2017-05-11 NOTE — MR AVS SNAPSHOT
After Visit Summary   5/11/2017    Rosibel Willingham    MRN: [id number]           Patient Information     Date Of Birth          1975        Visit Information        Provider Department      5/11/2017 8:00 AM Benjamin Beltran MD [facility] Specialty and Procedure        Today's Diagnoses     Kidney replaced by transplant    -  1    Secondary renal hyperparathyroidism (H)        Metabolic acidosis        Hypomagnesemia        Hypertension secondary to other renal disorders        Immunosuppressed status (H)        Anemia in chronic renal disease           Follow-ups after your visit        Your next 10 appointments already scheduled     May 15, 2017   Procedure with Leanne Montelongo MD   [facility] [place], Same Day Surgery (--)    [address] [facility] [address]   [phone]            May 15, 2017  2:00 PM CDT   (Arrive by 1:45 PM)   Kidney Post Op with Leanne Montelongo MD   [facility] Solid Organ Transplant ([facility])    [address] [place] [id number] [phone]            May 22, 2017  1:00 PM CDT   (Arrive by 12:30 PM)   Return Kidney Transplant with Benjamin Beltran MD   [facility] Nephrology ([facility])    [address] [place] [id number] [phone]            Jul 24, 2017  1:10 PM CDT   (Arrive by 12:40 PM)   Return Kidney Transplant with Benjamin Beltran MD   [facility] Nephrology ([facility])    [address] [place] [address] [id number] [phone]              Who to contact     If you have questions or need follow up information about today's clinic visit or your schedule please contact [facility] SPECIALTY AND PROCEDURE directly at [phone].  Normal or non-critical lab and imaging results will be communicated to you by MyChart, letter or phone within 4 business days after the clinic  has received the results. If you do not hear from us within 7 days, please contact the clinic through Trada or phone. If you have a critical or abnormal lab result, we will notify you by phone as soon as possible.  Submit refill requests through Trada or call your pharmacy and they will forward the refill request to us. Please allow 3 business days for your refill to be completed.          Additional Information About Your Visit        AtmoceanharStratavia Information     Trada gives you secure access to your electronic health record. If you see a primary care provider, you can also send messages to your care team and make appointments. If you have questions, please call your primary care clinic.  If you do not have a primary care provider, please call [phone] and they will assist you.        Care EveryWhere ID     This is your Care EveryWhere ID. This could be used by other organizations to access your [facility] medical records  [id number]         Blood Pressure from Last 3 Encounters:   05/11/17 (P) 120/76   05/09/17 133/80   05/08/17 131/76    Weight from Last 3 Encounters:   05/14/17 49.2 kg (108 lb 8 oz)   05/08/17 48.6 kg (107 lb 3.2 oz)   05/07/17 48.7 kg (107 lb 4.8 oz)              Today, you had the following     No orders found for display         Today's Medication Changes          These changes are accurate as of: 5/11/17 11:59 PM.  If you have any questions, ask your nurse or doctor.               These medicines have changed or have updated prescriptions.        Dose/Directions    * tacrolimus capsule   This may have changed:  You were already taking a medication with the same name, and this prescription was added. Make sure you understand how and when to take each.   Used for:  Kidney replaced by transplant   Changed by:  Benjamin Beltran MD        Dose:  1 mg   Take 1 capsule (1 mg) by mouth 2 times daily   Quantity:  180 capsule   Refills:  11       * tacrolimus 0.5 MG capsule   Commonly known as:   PROGRAF - GENERIC EQUIVALENT   This may have changed:    - how much to take  - how to take this  - when to take this   Used for:  Kidney replaced by transplant        Dose:  0.5 mg   Take 1 capsule (0.5 mg) by mouth every 12 hours Use for dose adjustments.   Quantity:  60 capsule   Refills:  11       * Notice:  This list has 2 medication(s) that are the same as other medications prescribed for you. Read the directions carefully, and ask your doctor or other care provider to review them with you.         Where to get your medicines      These medications [facility] to [facility] - [place], [place] - [address] AT [address] [place] [address] [facility] [address]     Phone:  [phone]     tacrolimus capsule         These medications were sent to [place], [place] - [address] [facility]  [address] [place] [address] [address] [place] [address]    Hours:  TRANSPLANT PHONE NUMBER [phone] Phone:  [phone]     tacrolimus 0.5 MG capsule                Primary Care Provider    Physician No Ref-Primary       No address on file        Thank you!     Thank you for choosing [facility] SPECIALTY AND PROCEDURE  for your care. Our goal is always to provide you with excellent care. Hearing back from our patients is one way we can continue to improve our services. Please take a few minutes to complete the written survey that you may receive in the mail after your visit with us. Thank you!             Your Updated Medication List - Protect others around you: Learn how to safely use, store and throw away your medicines at www.disposemymeds.org.          This list is accurate as of: 5/11/17 11:59 PM.  Always use your most recent med list.                   Brand Name Dispense Instructions for use    acetaminophen 325 MG tablet    TYLENOL    100 tablet    Take 2 tablets (650 mg) by mouth every 4 hours as needed for  mild pain or fever       amLODIPine 5 MG tablet    NORVASC    30 tablet    Take 1 tablet (5 mg) by mouth daily       cholecalciferol 1000 UNITS capsule    vitamin  -D    30 capsule    Take 1 capsule (1,000 Units) by mouth daily       magnesium oxide 400 MG tablet    MAG-OX    120 tablet    Take 1 tablet (400 mg) by mouth 2 times daily       mycophenolic acid 180 MG EC tablet    MYFORTIC - GENERIC EQUIVALENT    240 tablet    Take 3 tablets (540 mg) by mouth 2 times daily       nystatin 934925 unit/mL Susp suspension    MYCOSTATIN    240 mL    Swish and swallow 5 mLs (500,000 Units) in mouth 4 times daily       ondansetron 4 MG tablet    ZOFRAN    30 tablet    Take 1 tablet (4 mg) by mouth every 8 hours as needed for nausea       senna-docusate 8.6-50 MG per tablet    SENOKOT-S;PERICOLACE    100 tablet    Take 1-2 tablets by mouth daily as needed for constipation       sodium bicarbonate 650 MG tablet     60 tablet    Take 1 tablet (650 mg) by mouth 2 times daily       sulfamethoxazole-trimethoprim 400-80 MG per tablet    BACTRIM/SEPTRA    12 tablet    Take 1 tablet by mouth Every Mon, Wed, Fri Morning       * tacrolimus capsule     180 capsule    Take 1 capsule (1 mg) by mouth 2 times daily       * tacrolimus 0.5 MG capsule    PROGRAF - GENERIC EQUIVALENT    60 capsule    Take 1 capsule (0.5 mg) by mouth every 12 hours Use for dose adjustments.       valGANciclovir 450 MG tablet    VALCYTE    8 tablet    Take 1 tablet (450 mg) by mouth twice a week       * Notice:  This list has 2 medication(s) that are the same as other medications prescribed for you. Read the directions carefully, and ask your doctor or other care provider to review them with you.

## 2017-05-11 NOTE — TELEPHONE ENCOUNTER
Per Cara RN at [facility], patient having issues getting Home Care set-up.  Left message with kami Madrigal KP Coordinator at [facility] re: what they need to resolve Home Care issue.    Patient requesting to be followed by Dr. Sheriff (her Nephrologist) in [place].  T: [phone], F: [phone].  Facility requesting:     Op Note    Latest lab results    Discharge Summary  ---- LPN task, please faxc

## 2017-05-11 NOTE — PATIENT INSTRUCTIONS
Dear Rosibel Willingham    Thank you for choosing [facility] Infusion [facility] ([facility]) for your infusion.  The following information is a summary of our appointment as well as important reminders.      Please make sure your phone is available today because I will call to update you with your anti-rejection drug levels and possibly make changes to your anti-rejection dosages.      We look forward in seeing you on your next appointment here at [facility].  Please don t hesitate to call us at [phone] to reschedule any of your appointments or to speak with one of the [facility] registered nurses.  It was a pleasure taking care of you today.    Sincerely,  Nelida Sloan RN  [facility]  Specialty Infusion & Procedure Center  [address] [place] [address]  [address]  Phone:  [phone]

## 2017-05-12 ENCOUNTER — TELEPHONE (OUTPATIENT)
Dept: TRANSPLANT | Facility: CLINIC | Age: 42
End: 2017-05-12

## 2017-05-12 LAB
ANION GAP SERPL CALCULATED.3IONS-SCNC: 10 MMOL/L (ref 3–14)
BUN SERPL-MCNC: 34 MG/DL (ref 7–30)
CALCIUM SERPL-MCNC: 9.9 MG/DL (ref 8.5–10.1)
CHLORIDE SERPL-SCNC: 111 MMOL/L (ref 94–109)
CO2 SERPL-SCNC: 19 MMOL/L (ref 20–32)
CREAT SERPL-MCNC: 2.9 MG/DL (ref 0.52–1.04)
ERYTHROCYTE [DISTWIDTH] IN BLOOD BY AUTOMATED COUNT: 15.6 % (ref 10–15)
GFR SERPL CREATININE-BSD FRML MDRD: 18 ML/MIN/1.7M2
GLUCOSE SERPL-MCNC: 84 MG/DL (ref 70–99)
HCT VFR BLD AUTO: 27.3 % (ref 35–47)
HGB BLD-MCNC: 8.8 G/DL (ref 11.7–15.7)
MCH RBC QN AUTO: 28.8 PG (ref 26.5–33)
MCHC RBC AUTO-ENTMCNC: 32.2 G/DL (ref 31.5–36.5)
MCV RBC AUTO: 89 FL (ref 78–100)
PLATELET # BLD AUTO: 377 10E9/L (ref 150–450)
POTASSIUM SERPL-SCNC: 4.6 MMOL/L (ref 3.4–5.3)
RBC # BLD AUTO: 3.06 10E12/L (ref 3.8–5.2)
SODIUM SERPL-SCNC: 140 MMOL/L (ref 133–144)
TACROLIMUS BLD-MCNC: 5.3 UG/L (ref 5–15)
TME LAST DOSE: NORMAL H
WBC # BLD AUTO: 4.8 10E9/L (ref 4–11)

## 2017-05-12 PROCEDURE — 80197 ASSAY OF TACROLIMUS: CPT | Performed by: INTERNAL MEDICINE

## 2017-05-12 PROCEDURE — 85027 COMPLETE CBC AUTOMATED: CPT | Performed by: INTERNAL MEDICINE

## 2017-05-12 PROCEDURE — 80048 BASIC METABOLIC PNL TOTAL CA: CPT | Performed by: INTERNAL MEDICINE

## 2017-05-12 NOTE — TELEPHONE ENCOUNTER
Called Rosibel Willingham regarding JJ stent removal    Plan  Called asked to have labs done at 0800 at [facility]   NPO after midnight  --will need a  post procedure   Check in at 0830 for procedure 1030  Rosibel Willingham verbalized understanding

## 2017-05-12 NOTE — TELEPHONE ENCOUNTER
Dr Montelongo  JJ stent- surgery rescheduled for May 15, 17- 1030 and check in 0830  THEO VENTURA will notify pt

## 2017-05-13 ENCOUNTER — TELEPHONE (OUTPATIENT)
Dept: TRANSPLANT | Facility: CLINIC | Age: 42
End: 2017-05-13

## 2017-05-13 DIAGNOSIS — Z94.0 KIDNEY REPLACED BY TRANSPLANT: Primary | ICD-10-CM

## 2017-05-13 PROBLEM — E86.0 DEHYDRATION: Status: ACTIVE | Noted: 2017-05-13

## 2017-05-13 NOTE — TELEPHONE ENCOUNTER
Issue  Prograf tacrolimus level 5.3 below goal level after increasing Prograf tacrolimus 3.5 mg twice per day   Discussed with Dr Beltran   Plan to increase Prograf tacrolimus to 4.5 mg twice per day    Dr Beltran  Requesting IV fluids this weekend 1LNS

## 2017-05-14 ENCOUNTER — INFUSION THERAPY VISIT (OUTPATIENT)
Dept: INFUSION THERAPY | Facility: CLINIC | Age: 42
End: 2017-05-14
Attending: INTERNAL MEDICINE
Payer: COMMERCIAL

## 2017-05-14 VITALS
TEMPERATURE: 97.5 F | OXYGEN SATURATION: 97 % | RESPIRATION RATE: 16 BRPM | BODY MASS INDEX: 19.84 KG/M2 | WEIGHT: 108.5 LBS

## 2017-05-14 DIAGNOSIS — D84.9 IMMUNOSUPPRESSION (H): ICD-10-CM

## 2017-05-14 DIAGNOSIS — Z94.0 STATUS POST KIDNEY TRANSPLANT: Primary | ICD-10-CM

## 2017-05-14 DIAGNOSIS — Z94.0 KIDNEY REPLACED BY TRANSPLANT: ICD-10-CM

## 2017-05-14 LAB
ANION GAP SERPL CALCULATED.3IONS-SCNC: 8 MMOL/L (ref 3–14)
BASOPHILS # BLD AUTO: 0 10E9/L (ref 0–0.2)
BASOPHILS NFR BLD AUTO: 0.7 %
BUN SERPL-MCNC: 35 MG/DL (ref 7–30)
CALCIUM SERPL-MCNC: 9.7 MG/DL (ref 8.5–10.1)
CHLORIDE SERPL-SCNC: 111 MMOL/L (ref 94–109)
CO2 SERPL-SCNC: 22 MMOL/L (ref 20–32)
CREAT SERPL-MCNC: 2.87 MG/DL (ref 0.52–1.04)
DIFFERENTIAL METHOD BLD: ABNORMAL
EOSINOPHIL # BLD AUTO: 0.1 10E9/L (ref 0–0.7)
EOSINOPHIL NFR BLD AUTO: 2.5 %
ERYTHROCYTE [DISTWIDTH] IN BLOOD BY AUTOMATED COUNT: 15.7 % (ref 10–15)
GFR SERPL CREATININE-BSD FRML MDRD: 18 ML/MIN/1.7M2
GLUCOSE SERPL-MCNC: 89 MG/DL (ref 70–99)
HCT VFR BLD AUTO: 26.5 % (ref 35–47)
HGB BLD-MCNC: 8.5 G/DL (ref 11.7–15.7)
IMM GRANULOCYTES # BLD: 0 10E9/L (ref 0–0.4)
IMM GRANULOCYTES NFR BLD: 0.4 %
LYMPHOCYTES # BLD AUTO: 0.3 10E9/L (ref 0.8–5.3)
LYMPHOCYTES NFR BLD AUTO: 7.2 %
MAGNESIUM SERPL-MCNC: 1.6 MG/DL (ref 1.6–2.3)
MCH RBC QN AUTO: 28.7 PG (ref 26.5–33)
MCHC RBC AUTO-ENTMCNC: 32.1 G/DL (ref 31.5–36.5)
MCV RBC AUTO: 90 FL (ref 78–100)
MONOCYTES # BLD AUTO: 0.3 10E9/L (ref 0–1.3)
MONOCYTES NFR BLD AUTO: 5.8 %
NEUTROPHILS # BLD AUTO: 3.7 10E9/L (ref 1.6–8.3)
NEUTROPHILS NFR BLD AUTO: 83.4 %
NRBC # BLD AUTO: 0 10*3/UL
NRBC BLD AUTO-RTO: 0 /100
PHOSPHATE SERPL-MCNC: 2.8 MG/DL (ref 2.5–4.5)
PLATELET # BLD AUTO: 389 10E9/L (ref 150–450)
POTASSIUM SERPL-SCNC: 4.4 MMOL/L (ref 3.4–5.3)
RBC # BLD AUTO: 2.96 10E12/L (ref 3.8–5.2)
SODIUM SERPL-SCNC: 141 MMOL/L (ref 133–144)
TACROLIMUS BLD-MCNC: 8.1 UG/L (ref 5–15)
TME LAST DOSE: NORMAL H
WBC # BLD AUTO: 4.5 10E9/L (ref 4–11)

## 2017-05-14 PROCEDURE — 84100 ASSAY OF PHOSPHORUS: CPT | Performed by: INTERNAL MEDICINE

## 2017-05-14 PROCEDURE — 83735 ASSAY OF MAGNESIUM: CPT | Performed by: INTERNAL MEDICINE

## 2017-05-14 PROCEDURE — 80197 ASSAY OF TACROLIMUS: CPT | Performed by: INTERNAL MEDICINE

## 2017-05-14 PROCEDURE — 85025 COMPLETE CBC W/AUTO DIFF WBC: CPT | Performed by: INTERNAL MEDICINE

## 2017-05-14 PROCEDURE — 80048 BASIC METABOLIC PNL TOTAL CA: CPT | Performed by: INTERNAL MEDICINE

## 2017-05-14 RX ORDER — TACROLIMUS 1 MG/1
4 CAPSULE ORAL 2 TIMES DAILY
Qty: 100 CAPSULE | Refills: 0 | COMMUNITY
Start: 2017-05-14 | End: 2017-05-22

## 2017-05-14 NOTE — PATIENT INSTRUCTIONS
Dear Rosibel Willingham    Thank you for choosing [facility] Specialty Infusion and Procedure Center ([facility]) for your infusion.  The following information is a summary of our appointment as well as important reminders.      Please make sure your phone is available today because I will call to update you with your anti-rejection drug levels and possibly make changes to your anti-rejection dosages.      We look forward in seeing you on your next appointment here at [facility].  Please don t hesitate to call us at [phone] to reschedule any of your appointments or to speak with one of the [facility] registered nurses.  It was a pleasure taking care of you today.    Sincerely,  Mellissa Antunez RN  [facility]  Specialty Infusion & Procedure Center  [address] [place] [address]  [address]  Phone:  [phone]

## 2017-05-14 NOTE — PROGRESS NOTES
"Rosibel Willingham came to [facility] today for a lab and assess following a DDTK transplant on 4/23/17.      Discharge date: 4/27/17  Transplant coordinator: Chinyere Burnham  Phone number patient can be reached at: Rosibel cell: [phone]     Pt to have stent removed on Monday 5/15/17 at hospital, possible J/P drain removal if surgeon approves. Per Dr. Beltran, pt to push PO fluids and anticipate biopsy to be scheduled for Tuesday 5/16/17.     Pt last took Prograf at 8 pm last evening; labs drawn at 0840 this morning.       Physical Assessment:  See physical assessment located under \"Document Flowsheets\".  Incision site: C/D/I and secured with Dermabond; insertion site of J/P drain clear of infection; area cleansed with Micro-klenz spray and new dressing replaced  Lines: J/P output consistently < 30 ml for several days; seroussanguineous output; scant dried serous discharge noted to dressing; surrounding tissue free from erythema, warmth, or s/sx of infection  Huang: n/a  Urine clarity: pt reported clear yellow urine  Hydration: pt drinking 2+L non-caffeinated fluids daily; declined offer for IV hydration, preferring oral intake; maintaining increased fluid intake reinforced, pt verbalized understanding  Nutrition: adequate intake, no N   Last BM: regular bowel status, no constipation  Pain: no incisional pain, only pain reported to J/P drain insertion site, persistent and rated at \"1-2/10\", controlled with PRN Tylenol, desires to have drain removed     Labs drawn by [facility] staff Yes    Plan of care for today: labs and review of results, drain care and assessment     Medication changes: none    Medications administered: pt took all scheduled AM medications following lab draw      Patient education:    The following teaching topics were addressed: Importance of drinking 2L of non-caffeinated fluids daily, Incisional care and Drain care   Patient and  Ian verbalized understanding and all questions answered.    Drug " level:  Prograf level today reviewed with Dr Beltran who gave orders to continue current dose of 4.5 mg BID.  Patient was updated with this information and verbalized understanding. Pt to arrive at [facility] around 0830 tomorrow morning for pre op prior to stent removal. Pt verbalized plan to have transplant labs drawn at that time, and then take scheduled medications.  Face to face time: 10 minutes  Discharge Plan    Pt will follow up with stent removal at hospital tomorrow 5/15/17, and possible biopsy for 5/16/17.  Discharge instructions reviewed with patient: YES  Patient/Representative verbalized understanding, all questions answered: YES    Discharged from unit at 0940 with whom:  Ian to [facility].    Mellissa Antunez RN

## 2017-05-14 NOTE — MR AVS SNAPSHOT
After Visit Summary   5/14/2017    Rosibel Willingham    MRN: [id number]           Patient Information     Date Of Birth          1975        Visit Information        Provider Department      5/14/2017 8:00 AM UC 45 ATC; UC SPEC INFUSION [facility] [place] Specialty and Procedure        Today's Diagnoses     Status post kidney transplant    -  1      Care Instructions    Dear Rosibel Willingham    Thank you for choosing [facility] ([facility]) for your infusion.  The following information is a summary of our appointment as well as important reminders.      Please make sure your phone is available today because I will call to update you with your anti-rejection drug levels and possibly make changes to your anti-rejection dosages.      We look forward in seeing you on your next appointment here at [facility].  Please don t hesitate to call us at [phone] to reschedule any of your appointments or to speak with one of the [facility] registered nurses.  It was a pleasure taking care of you today.    Sincerely,  Mellissa Antunez RN  [facility]  Specialty Infusion & Procedure Center  [address] [place] [address]  [address]  Phone:  [phone]          Follow-ups after your visit        Your next 10 appointments already scheduled     May 15, 2017   Procedure with Leanne Montelongo MD   [facility] [place], Same Day Surgery (--)    [address] [facility] [place] [address] [phone]            May 15, 2017  2:00 PM CDT   (Arrive by 1:45 PM)   Kidney Post Op with Leanne Montelongo MD   [facility] Solid Organ Transplant ([facility] [place])    [address] [place] [id number] [phone]            May 22, 2017  1:00 PM CDT   (Arrive by 12:30 PM)   Return Kidney Transplant with Benjamin Beltran MD   [facility] Nephrology ([facility])    [address] [place] [address]  [address] [facility] [id number] [phone]            Jul 24, 2017  1:10 PM CDT   (Arrive by 12:40 PM)   Return Kidney Transplant with Benjamin Beltran MD   [facility] Nephrology ([facility])    [address] [facility]  [address] [facility] [id number] [phone]              Who to contact     If you have questions or need follow up information about today's clinic visit or your schedule please contact [facility] SPECIALTY AND PROCEDURE directly at [phone].  Normal or non-critical lab and imaging results will be communicated to you by Digital Foliohart, letter or phone within 4 business days after the clinic has received the results. If you do not hear from us within 7 days, please contact the clinic through EPSt or phone. If you have a critical or abnormal lab result, we will notify you by phone as soon as possible.  Submit refill requests through Purewire or call your pharmacy and they will forward the refill request to us. Please allow 3 business days for your refill to be completed.          Additional Information About Your Visit        MyChart Information     Purewire gives you secure access to your electronic health record. If you see a primary care provider, you can also send messages to your care team and make appointments. If you have questions, please call your primary care clinic.  If you do not have a primary care provider, please call [phone] and they will assist you.        Care EveryWhere ID     This is your Care EveryWhere ID. This could be used by other organizations to access your [facility] medical records  [id number]        Your Vitals Were     Temperature Respirations Pulse Oximetry BMI (Body Mass Index)          97.5  F (36.4  C) (Tympanic) 16 97% 19.84 kg/m2         Blood Pressure from Last 3 Encounters:   05/11/17 (P) 120/76   05/09/17 133/80   05/08/17 131/76    Weight from Last 3 Encounters:   05/14/17 49.2 kg (108 lb 8 oz)    05/08/17 48.6 kg (107 lb 3.2 oz)   05/07/17 48.7 kg (107 lb 4.8 oz)              We Performed the Following     Basic metabolic panel     CBC with platelets differential     Magnesium     Phosphorus     Tacrolimus level        Primary Care Provider    Physician No Ref-Primary       No address on file        Thank you!     Thank you for choosing [facility] SPECIALTY AND PROCEDURE  for your care. Our goal is always to provide you with excellent care. Hearing back from our patients is one way we can continue to improve our services. Please take a few minutes to complete the written survey that you may receive in the mail after your visit with us. Thank you!             Your Updated Medication List - Protect others around you: Learn how to safely use, store and throw away your medicines at www.disposemymeds.org.          This list is accurate as of: 5/14/17  3:09 PM.  Always use your most recent med list.                   Brand Name Dispense Instructions for use    acetaminophen 325 MG tablet    TYLENOL    100 tablet    Take 2 tablets (650 mg) by mouth every 4 hours as needed for mild pain or fever       amLODIPine 5 MG tablet    NORVASC    30 tablet    Take 1 tablet (5 mg) by mouth daily       cholecalciferol 1000 UNITS capsule    vitamin  -D    30 capsule    Take 1 capsule (1,000 Units) by mouth daily       magnesium oxide 400 MG tablet    MAG-OX    120 tablet    Take 1 tablet (400 mg) by mouth 2 times daily       mycophenolic acid 180 MG EC tablet    MYFORTIC - GENERIC EQUIVALENT    240 tablet    Take 3 tablets (540 mg) by mouth 2 times daily       nystatin 263583 unit/mL Susp suspension    MYCOSTATIN    240 mL    Swish and swallow 5 mLs (500,000 Units) in mouth 4 times daily       ondansetron 4 MG tablet    ZOFRAN    30 tablet    Take 1 tablet (4 mg) by mouth every 8 hours as needed for nausea       senna-docusate 8.6-50 MG per tablet    SENOKOT-S;PERICOLACE    100 tablet    Take 1-2  tablets by mouth daily as needed for constipation       sodium bicarbonate 650 MG tablet     60 tablet    Take 1 tablet (650 mg) by mouth 2 times daily       sulfamethoxazole-trimethoprim 400-80 MG per tablet    BACTRIM/SEPTRA    12 tablet    Take 1 tablet by mouth Every Mon, Wed, Fri Morning       * tacrolimus 1 MG capsule    PROGRAF - GENERIC EQUIVALENT    100 capsule    Take 3 capsules (3 mg) by mouth 2 times daily       * tacrolimus capsule     180 capsule    Take 1 capsule (1 mg) by mouth 2 times daily       * tacrolimus 0.5 MG capsule    PROGRAF - GENERIC EQUIVALENT    60 capsule    Take 1 capsule (0.5 mg) by mouth every 12 hours Use for dose adjustments.       valGANciclovir 450 MG tablet    VALCYTE    8 tablet    Take 1 tablet (450 mg) by mouth twice a week       * Notice:  This list has 3 medication(s) that are the same as other medications prescribed for you. Read the directions carefully, and ask your doctor or other care provider to review them with you.

## 2017-05-15 ENCOUNTER — OFFICE VISIT (OUTPATIENT)
Dept: TRANSPLANT | Facility: CLINIC | Age: 42
End: 2017-05-15
Attending: SURGERY
Payer: COMMERCIAL

## 2017-05-15 ENCOUNTER — ANESTHESIA EVENT (OUTPATIENT)
Dept: SURGERY | Facility: CLINIC | Age: 42
End: 2017-05-15

## 2017-05-15 ENCOUNTER — HOSPITAL ENCOUNTER (OUTPATIENT)
Facility: CLINIC | Age: 42
Discharge: HOME | End: 2017-05-15
Attending: SURGERY | Admitting: SURGERY
Payer: COMMERCIAL

## 2017-05-15 ENCOUNTER — INFUSION THERAPY VISIT (OUTPATIENT)
Dept: INFUSION THERAPY | Facility: CLINIC | Age: 42
End: 2017-05-15
Attending: SURGERY
Payer: COMMERCIAL

## 2017-05-15 ENCOUNTER — RECORDS - HEALTHEAST (OUTPATIENT)
Dept: ADMINISTRATIVE | Facility: OTHER | Age: 42
End: 2017-05-15

## 2017-05-15 ENCOUNTER — ANESTHESIA (OUTPATIENT)
Dept: SURGERY | Facility: CLINIC | Age: 42
End: 2017-05-15

## 2017-05-15 VITALS
TEMPERATURE: 98 F | OXYGEN SATURATION: 99 % | RESPIRATION RATE: 20 BRPM | BODY MASS INDEX: 19.84 KG/M2 | SYSTOLIC BLOOD PRESSURE: 130 MMHG | WEIGHT: 107.81 LBS | HEART RATE: 84 BPM | HEIGHT: 62 IN | DIASTOLIC BLOOD PRESSURE: 87 MMHG

## 2017-05-15 VITALS
OXYGEN SATURATION: 100 % | HEART RATE: 92 BPM | DIASTOLIC BLOOD PRESSURE: 80 MMHG | RESPIRATION RATE: 16 BRPM | SYSTOLIC BLOOD PRESSURE: 130 MMHG | TEMPERATURE: 98.2 F

## 2017-05-15 DIAGNOSIS — Z94.0 KIDNEY REPLACED BY TRANSPLANT: Primary | ICD-10-CM

## 2017-05-15 DIAGNOSIS — E86.0 DEHYDRATION: Primary | ICD-10-CM

## 2017-05-15 DIAGNOSIS — Z94.0 KIDNEY REPLACED BY TRANSPLANT: ICD-10-CM

## 2017-05-15 DIAGNOSIS — Z94.0 KIDNEY TRANSPLANTED: Primary | ICD-10-CM

## 2017-05-15 LAB
ALBUMIN UR-MCNC: 30 MG/DL
ANION GAP SERPL CALCULATED.3IONS-SCNC: 9 MMOL/L (ref 3–14)
APPEARANCE UR: CLEAR
BASOPHILS # BLD AUTO: 0 10E9/L (ref 0–0.2)
BASOPHILS NFR BLD AUTO: 0.4 %
BILIRUB UR QL STRIP: NEGATIVE
BUN SERPL-MCNC: 33 MG/DL (ref 7–30)
CALCIUM SERPL-MCNC: 9.9 MG/DL (ref 8.5–10.1)
CHLORIDE SERPL-SCNC: 111 MMOL/L (ref 94–109)
CO2 SERPL-SCNC: 20 MMOL/L (ref 20–32)
COLOR UR AUTO: YELLOW
CREAT FLD-MCNC: 3.1 MG/DL
CREAT SERPL-MCNC: 3.1 MG/DL (ref 0.52–1.04)
CREAT UR-MCNC: 138 MG/DL
DIFFERENTIAL METHOD BLD: ABNORMAL
EOSINOPHIL # BLD AUTO: 0.1 10E9/L (ref 0–0.7)
EOSINOPHIL NFR BLD AUTO: 1.6 %
ERYTHROCYTE [DISTWIDTH] IN BLOOD BY AUTOMATED COUNT: 16.3 % (ref 10–15)
GFR SERPL CREATININE-BSD FRML MDRD: 17 ML/MIN/1.7M2
GLUCOSE SERPL-MCNC: 88 MG/DL (ref 70–99)
GLUCOSE UR STRIP-MCNC: NEGATIVE MG/DL
GRAN CASTS #/AREA URNS LPF: 3 /LPF
HCT VFR BLD AUTO: 25.7 % (ref 35–47)
HGB BLD-MCNC: 8.4 G/DL (ref 11.7–15.7)
HGB UR QL STRIP: ABNORMAL
HYALINE CASTS #/AREA URNS LPF: 7 /LPF (ref 0–2)
IMM GRANULOCYTES # BLD: 0 10E9/L (ref 0–0.4)
IMM GRANULOCYTES NFR BLD: 0.2 %
KETONES UR STRIP-MCNC: NEGATIVE MG/DL
LEUKOCYTE ESTERASE UR QL STRIP: NEGATIVE
LYMPHOCYTES # BLD AUTO: 0.3 10E9/L (ref 0.8–5.3)
LYMPHOCYTES NFR BLD AUTO: 6.7 %
MAGNESIUM SERPL-MCNC: 1.9 MG/DL (ref 1.6–2.3)
MCH RBC QN AUTO: 29.1 PG (ref 26.5–33)
MCHC RBC AUTO-ENTMCNC: 32.7 G/DL (ref 31.5–36.5)
MCV RBC AUTO: 89 FL (ref 78–100)
MONOCYTES # BLD AUTO: 0.3 10E9/L (ref 0–1.3)
MONOCYTES NFR BLD AUTO: 6.5 %
MUCOUS THREADS #/AREA URNS LPF: PRESENT /LPF
NEUTROPHILS # BLD AUTO: 4.3 10E9/L (ref 1.6–8.3)
NEUTROPHILS NFR BLD AUTO: 84.6 %
NITRATE UR QL: NEGATIVE
NRBC # BLD AUTO: 0 10*3/UL
NRBC BLD AUTO-RTO: 0 /100
PH UR STRIP: 6.5 PH (ref 5–7)
PHOSPHATE SERPL-MCNC: 2.8 MG/DL (ref 2.5–4.5)
PLATELET # BLD AUTO: 334 10E9/L (ref 150–450)
POTASSIUM SERPL-SCNC: 4.6 MMOL/L (ref 3.4–5.3)
RBC # BLD AUTO: 2.89 10E12/L (ref 3.8–5.2)
RBC #/AREA URNS AUTO: 5 /HPF (ref 0–2)
SODIUM SERPL-SCNC: 140 MMOL/L (ref 133–144)
SP GR UR STRIP: 1.01 (ref 1–1.03)
SPECIMEN SOURCE FLD: NORMAL
SQUAMOUS #/AREA URNS AUTO: 1 /HPF (ref 0–1)
TACROLIMUS BLD-MCNC: 9.2 UG/L (ref 5–15)
TME LAST DOSE: NORMAL H
TRANS CELLS #/AREA URNS HPF: <1 /HPF (ref 0–1)
URN SPEC COLLECT METH UR: ABNORMAL
UROBILINOGEN UR STRIP-MCNC: NORMAL MG/DL (ref 0–2)
WBC # BLD AUTO: 5.1 10E9/L (ref 4–11)
WBC #/AREA URNS AUTO: 3 /HPF (ref 0–2)

## 2017-05-15 PROCEDURE — 25800025 ZZH RX 258: Performed by: NURSE ANESTHETIST, CERTIFIED REGISTERED

## 2017-05-15 PROCEDURE — 80061 LIPID PANEL: CPT | Performed by: SURGERY

## 2017-05-15 PROCEDURE — 36000053 ZZH SURGERY LEVEL 2 EA 15 ADDTL MIN - UMMC: Performed by: SURGERY

## 2017-05-15 PROCEDURE — 25000128 H RX IP 250 OP 636: Performed by: NURSE ANESTHETIST, CERTIFIED REGISTERED

## 2017-05-15 PROCEDURE — 81001 URINALYSIS AUTO W/SCOPE: CPT | Performed by: SURGERY

## 2017-05-15 PROCEDURE — 83704 LIPOPROTEIN BLD QUAN PART: CPT | Performed by: SURGERY

## 2017-05-15 PROCEDURE — 84100 ASSAY OF PHOSPHORUS: CPT | Performed by: SURGERY

## 2017-05-15 PROCEDURE — 25000128 H RX IP 250 OP 636: Performed by: TRANSPLANT SURGERY

## 2017-05-15 PROCEDURE — 40000170 ZZH STATISTIC PRE-PROCEDURE ASSESSMENT II: Performed by: SURGERY

## 2017-05-15 PROCEDURE — 80048 BASIC METABOLIC PNL TOTAL CA: CPT | Performed by: SURGERY

## 2017-05-15 PROCEDURE — 83735 ASSAY OF MAGNESIUM: CPT | Performed by: SURGERY

## 2017-05-15 PROCEDURE — 85025 COMPLETE CBC W/AUTO DIFF WBC: CPT | Performed by: SURGERY

## 2017-05-15 PROCEDURE — 25000125 ZZHC RX 250: Performed by: NURSE ANESTHETIST, CERTIFIED REGISTERED

## 2017-05-15 PROCEDURE — 80197 ASSAY OF TACROLIMUS: CPT | Performed by: SURGERY

## 2017-05-15 PROCEDURE — 36000051 ZZH SURGERY LEVEL 2 1ST 30 MIN - UMMC: Performed by: SURGERY

## 2017-05-15 PROCEDURE — 71000027 ZZH RECOVERY PHASE 2 EACH 15 MINS: Performed by: SURGERY

## 2017-05-15 PROCEDURE — 37000008 ZZH ANESTHESIA TECHNICAL FEE, 1ST 30 MIN: Performed by: SURGERY

## 2017-05-15 PROCEDURE — 82570 ASSAY OF URINE CREATININE: CPT | Mod: 91 | Performed by: SURGERY

## 2017-05-15 PROCEDURE — 37000009 ZZH ANESTHESIA TECHNICAL FEE, EACH ADDTL 15 MIN: Performed by: SURGERY

## 2017-05-15 RX ORDER — MEPERIDINE HYDROCHLORIDE 25 MG/ML
12.5 INJECTION INTRAMUSCULAR; INTRAVENOUS; SUBCUTANEOUS
Status: DISCONTINUED | OUTPATIENT
Start: 2017-05-15 | End: 2017-05-15 | Stop reason: HOSPADM

## 2017-05-15 RX ORDER — LEVOFLOXACIN 5 MG/ML
250 INJECTION, SOLUTION INTRAVENOUS ONCE
Status: COMPLETED | OUTPATIENT
Start: 2017-05-15 | End: 2017-05-15

## 2017-05-15 RX ORDER — ONDANSETRON 2 MG/ML
4 INJECTION INTRAMUSCULAR; INTRAVENOUS EVERY 30 MIN PRN
Status: DISCONTINUED | OUTPATIENT
Start: 2017-05-15 | End: 2017-05-15 | Stop reason: HOSPADM

## 2017-05-15 RX ORDER — FENTANYL CITRATE 50 UG/ML
INJECTION, SOLUTION INTRAMUSCULAR; INTRAVENOUS PRN
Status: DISCONTINUED | OUTPATIENT
Start: 2017-05-15 | End: 2017-05-15

## 2017-05-15 RX ORDER — ONDANSETRON 4 MG/1
4 TABLET, ORALLY DISINTEGRATING ORAL EVERY 30 MIN PRN
Status: DISCONTINUED | OUTPATIENT
Start: 2017-05-15 | End: 2017-05-15 | Stop reason: HOSPADM

## 2017-05-15 RX ORDER — PROPOFOL 10 MG/ML
INJECTION, EMULSION INTRAVENOUS CONTINUOUS PRN
Status: DISCONTINUED | OUTPATIENT
Start: 2017-05-15 | End: 2017-05-15

## 2017-05-15 RX ORDER — LIDOCAINE HYDROCHLORIDE 20 MG/ML
INJECTION, SOLUTION INFILTRATION; PERINEURAL PRN
Status: DISCONTINUED | OUTPATIENT
Start: 2017-05-15 | End: 2017-05-15

## 2017-05-15 RX ORDER — PROPOFOL 10 MG/ML
INJECTION, EMULSION INTRAVENOUS PRN
Status: DISCONTINUED | OUTPATIENT
Start: 2017-05-15 | End: 2017-05-15

## 2017-05-15 RX ORDER — SODIUM CHLORIDE, SODIUM LACTATE, POTASSIUM CHLORIDE, CALCIUM CHLORIDE 600; 310; 30; 20 MG/100ML; MG/100ML; MG/100ML; MG/100ML
INJECTION, SOLUTION INTRAVENOUS CONTINUOUS
Status: DISCONTINUED | OUTPATIENT
Start: 2017-05-15 | End: 2017-05-15 | Stop reason: HOSPADM

## 2017-05-15 RX ORDER — SODIUM CHLORIDE, SODIUM LACTATE, POTASSIUM CHLORIDE, CALCIUM CHLORIDE 600; 310; 30; 20 MG/100ML; MG/100ML; MG/100ML; MG/100ML
INJECTION, SOLUTION INTRAVENOUS CONTINUOUS PRN
Status: DISCONTINUED | OUTPATIENT
Start: 2017-05-15 | End: 2017-05-15

## 2017-05-15 RX ORDER — NALOXONE HYDROCHLORIDE 0.4 MG/ML
.1-.4 INJECTION, SOLUTION INTRAMUSCULAR; INTRAVENOUS; SUBCUTANEOUS
Status: DISCONTINUED | OUTPATIENT
Start: 2017-05-15 | End: 2017-05-15 | Stop reason: HOSPADM

## 2017-05-15 RX ADMIN — SODIUM CHLORIDE 1000 ML: 9 INJECTION, SOLUTION INTRAVENOUS at 15:21

## 2017-05-15 RX ADMIN — LIDOCAINE HYDROCHLORIDE 60 MG: 20 INJECTION, SOLUTION INFILTRATION; PERINEURAL at 10:28

## 2017-05-15 RX ADMIN — PROPOFOL 20 MG: 10 INJECTION, EMULSION INTRAVENOUS at 10:40

## 2017-05-15 RX ADMIN — PROPOFOL 100 MCG/KG/MIN: 10 INJECTION, EMULSION INTRAVENOUS at 10:28

## 2017-05-15 RX ADMIN — FENTANYL CITRATE 50 MCG: 50 INJECTION, SOLUTION INTRAMUSCULAR; INTRAVENOUS at 10:40

## 2017-05-15 RX ADMIN — LEVOFLOXACIN 250 MG: 5 INJECTION, SOLUTION INTRAVENOUS at 10:36

## 2017-05-15 RX ADMIN — MIDAZOLAM HYDROCHLORIDE 1 MG: 1 INJECTION, SOLUTION INTRAMUSCULAR; INTRAVENOUS at 10:15

## 2017-05-15 RX ADMIN — PROPOFOL 30 MG: 10 INJECTION, EMULSION INTRAVENOUS at 10:38

## 2017-05-15 RX ADMIN — MIDAZOLAM HYDROCHLORIDE 1 MG: 1 INJECTION, SOLUTION INTRAMUSCULAR; INTRAVENOUS at 10:28

## 2017-05-15 RX ADMIN — PROPOFOL 20 MG: 10 INJECTION, EMULSION INTRAVENOUS at 10:33

## 2017-05-15 RX ADMIN — SODIUM CHLORIDE, POTASSIUM CHLORIDE, SODIUM LACTATE AND CALCIUM CHLORIDE: 600; 310; 30; 20 INJECTION, SOLUTION INTRAVENOUS at 10:15

## 2017-05-15 RX ADMIN — PROPOFOL 30 MG: 10 INJECTION, EMULSION INTRAVENOUS at 10:29

## 2017-05-15 NOTE — PROGRESS NOTES
Infusion nursing note:    Rosibel Willingham presents today to [facility] for a NS one liter  infusion.  During today's [facility] appointment orders from Dr morales were completed.  Frequency: one time order    Progress note:  ID verified by name and .  Assessment completed.  Vitals were stable throughout time in [facility].  verbal education given to patient/representative regarding infusion and possible side effects.  Patient verbalized understanding.    Note: Pt denies any physical concerns; she is scheduled for a transplanted kidney bx in the morning due to slow functioning .    Infusion given over approximately  o.25hours         LMP 05/15/2017      Discharge plan:    Discharge instructions were reviewed with patient: Yes  Patient/representative verbalized understanding of discharge instructions and all questions answered: Yes.    Discharged from [facility] at 1630 with  to home.    [place]

## 2017-05-15 NOTE — DISCHARGE INSTRUCTIONS
Take it easy when you get home.  Remember, same day surgery DOES NOT MEAN SAME DAY RECOVERY!  Healing is a gradual process.  You will need some time to recover - you may be more tired than you realize at first.  Rest and relax for at least the first 24 hours at home.  You'll feel better and heal faster if you take good care of yourself.    [facility] [place]  Same-Day Surgery   Adult Discharge Orders & Instructions     For 24 hours after surgery    1. Get plenty of rest.  A responsible adult must stay with you for at least 24 hours after you leave the hospital.   2. Do not drive or use heavy equipment.  If you have weakness or tingling, don't drive or use heavy equipment until this feeling goes away.  3. Do not drink alcohol.  4. Avoid strenuous or risky activities.  Ask for help when climbing stairs.   5. You may feel lightheaded.  IF so, sit for a few minutes before standing.  Have someone help you get up.   6. If you have nausea (feel sick to your stomach): Drink only clear liquids such as apple juice, ginger ale, broth or 7-Up.  Rest may also help.  Be sure to drink enough fluids.  Move to a regular diet as you feel able.  7. You may have a slight fever. Call the doctor if your fever is over 100 F (37.7 C) (taken under the tongue) or lasts longer than 24 hours.  8. You may have a dry mouth, a sore throat, muscle aches or trouble sleeping.  These should go away after 24 hours.  9. Do not make important or legal decisions.   Call your doctor for any of the followin.  Signs of infection (fever, growing tenderness at the surgery site, a large amount of drainage or bleeding, severe pain, foul-smelling drainage, redness, swelling).    2. It has been over 8 to 10 hours since surgery and you are still not able to urinate (pass water).    3.  Headache for over 24 hours.      To contact a doctor, call Dr Montelongo's office at [phone] or:        [phone] and ask for the  resident on call for Transplant (answered 24 hours a day)      Emergency Department:    [facility]: [phone]       (TTY for hearing impaired: [phone])    [facility]: [phone]       (TTY for hearing impaired: [phone])

## 2017-05-15 NOTE — ANESTHESIA CARE TRANSFER NOTE
Patient: Rosibel Willingham    Procedure(s):  Cystoscopy, Right Stent Removal  - Wound Class: II-Clean Contaminated    Diagnosis: Post Kidney Transplant   Diagnosis Additional Information: No value filed.    Anesthesia Type:   MAC     Note:  Airway :Nasal Cannula  Patient transferred to:Phase II  Comments: Patient transferred to PACU spontaneously breathing.  VSS.  Report to RN.       Vitals: (Last set prior to Anesthesia Care Transfer)    CRNA VITALS  5/15/2017 1020 - 5/15/2017 1055      5/15/2017             NIBP: 97/59    NIBP Mean: 73                Electronically Signed By: ALEJANDRA Garner CRNA  May 15, 2017  10:55 AM

## 2017-05-15 NOTE — OR NURSING
Pt declined urine pregnancy test- Dr. Edi Antunez cleared pt to have procedure without urine pregnancy test.

## 2017-05-15 NOTE — OP NOTE
Transplant Surgery  Operative Note    Preop dx: Status post  donor kidney transplant.  Post op dx: same   Procedure: Flexible cystoscopy with stent removal   Surgeon: Leanne Montelongo M.D.  Assistant: Mani Whiteside fellow  Anesthesia: MAC w/ sedation  EBL: 0   Specimens: urine culture.   Findings: none abnormal. Stent inspected and noted to be intact.   Indication: The patient is status post kidney transplant and the ureteral stent is no longer needed.    Procedure: The patient was brought to the operating room and placed supine on the table.  Sedation was administered and monitored by anesthesia.  Groin was sterilely prepped and draped in the usual fashion. Time out was done. Lido Jet was applied to urethra and a catheterized urine sample was obtained. Antibiotics were administered. A flexible cystoscope was inserted and advanced thru the urethra into the bladder. The stent was visualized and grasped. The cystoscope, grasper and stent were removed en-mass. The stent was visualized to be intact.  The bladder mucosa was normal in appearance. A catheter was reinserted and the bladder drained. Faculty was present for the entire procedure. The patient tolerated the procedure well and  was transferred in stable and satisfactory condition to the PACU.   I was present during the key portions of the procedure, and I was immediately available for the entire procedure between opening and closing

## 2017-05-15 NOTE — IP AVS SNAPSHOT
Same Day Surgery [address] [address]    Phone:  [phone]                                       After Visit Summary   5/15/2017    Rosibel Willingham    MRN: [id number]           After Visit Summary Signature Page     I have received my discharge instructions, and my questions have been answered. I have discussed any challenges I see with this plan with the nurse or doctor.    ..........................................................................................................................................  Patient/Patient Representative Signature      ..........................................................................................................................................  Patient Representative Print Name and Relationship to Patient    ..................................................               ................................................  Date                                            Time    ..........................................................................................................................................  Reviewed by Signature/Title    ...................................................              ..............................................  Date                                                            Time

## 2017-05-15 NOTE — IP AVS SNAPSHOT
MRN:[id number]                      After Visit Summary   5/15/2017    Rosibel Willingham    MRN: [id number]           Thank you!     Thank you for choosing [facility] for your care. Our goal is always to provide you with excellent care. Hearing back from our patients is one way we can continue to improve our services. Please take a few minutes to complete the written survey that you may receive in the mail after you visit with us. Thank you!        Patient Information     Date Of Birth          1975        About your hospital stay     You were admitted on:  May 15, 2017 You last received care in the:  Same Day Surgery [facility]    You were discharged on:  May 15, 2017        Reason for your hospital stay       Cystoscopy and stent removal                  Who to Call     For medical emergencies, please call 911.  For non-urgent questions about your medical care, please call your primary care provider or clinic, None  For questions related to your surgery, please call your surgery clinic        Attending Provider     Provider Specialty    Leanne Montelongo MD Transplant       Primary Care Provider    Physician No Ref-Primary       No address on file        After Care Instructions     Activity       Your activity upon discharge: activity as tolerated and no driving for today            Diet       Follow this diet upon discharge: Regular            Discharge Instructions       Contact your coordinator right away if:  Your urine contains blood clots or you see a large amount of blood-tinged urine.    You constantly leak urine.  You have a fever over 100.4 F (38 C), chills, nausea, or vomiting.  Your pain is not relieved with medication.    - You may experience pink colored urine and some burning sensation during urination                  Follow-up Appointments     Adult [facility] Follow-up and recommended labs and tests       Follow up with transplant nephrology and transplant surgery , at  (location with clinic name or city) transplant surgery clinic, as per protocol    Appointments on [place] and/or [facility] (with [facility] or [facility] provider or service). Call [phone] if you haven't heard regarding these appointments within 7 days of discharge.                  Your next 10 appointments already scheduled     May 15, 2017  2:00 PM CDT   (Arrive by 1:45 PM)   Kidney Post Op with Leanne Montelongo MD   [facility] Solid Organ Transplant ([facility])    [address] [place] [id number]   [phone]            May 22, 2017  1:00 PM CDT   (Arrive by 12:30 PM)   Return Kidney Transplant with Benjamin Beltran MD   [facility] Nephrology ([facility])    [address] [place] [id number]   [phone]            Jul 24, 2017  1:10 PM CDT   (Arrive by 12:40 PM)   Return Kidney Transplant with Benjamin Beltran MD   [facility] Nephrology ([facility])    [address] [place] [phone]   [phone]              Further instructions from your care team       Take it easy when you get home.  Remember, same day surgery DOES NOT MEAN SAME DAY RECOVERY!  Healing is a gradual process.  You will need some time to recover - you may be more tired than you realize at first.  Rest and relax for at least the first 24 hours at home.  You'll feel better and heal faster if you take good care of yourself.    [facility]  Same-Day Surgery   Adult Discharge Orders & Instructions     For 24 hours after surgery    1. Get plenty of rest.  A responsible adult must stay with you for at least 24 hours after you leave the hospital.   2. Do not drive or use heavy equipment.  If you have weakness or tingling, don't drive or use heavy equipment until this feeling goes away.  3. Do not drink alcohol.  4. Avoid strenuous or risky activities.  Ask for  help when climbing stairs.   5. You may feel lightheaded.  IF so, sit for a few minutes before standing.  Have someone help you get up.   6. If you have nausea (feel sick to your stomach): Drink only clear liquids such as apple juice, ginger ale, broth or 7-Up.  Rest may also help.  Be sure to drink enough fluids.  Move to a regular diet as you feel able.  7. You may have a slight fever. Call the doctor if your fever is over 100 F (37.7 C) (taken under the tongue) or lasts longer than 24 hours.  8. You may have a dry mouth, a sore throat, muscle aches or trouble sleeping.  These should go away after 24 hours.  9. Do not make important or legal decisions.   Call your doctor for any of the followin.  Signs of infection (fever, growing tenderness at the surgery site, a large amount of drainage or bleeding, severe pain, foul-smelling drainage, redness, swelling).    2. It has been over 8 to 10 hours since surgery and you are still not able to urinate (pass water).    3.  Headache for over 24 hours.      To contact a doctor, call Dr Montelongo's office at [phone] or:        [phone] and ask for the resident on call for Transplant (answered 24 hours a day)      Emergency Department:    [facility]: [phone]       (TTY for hearing impaired: [phone])    [facility]: [phone]       (TTY for hearing impaired: [phone])          Pending Results     Date and Time Order Name Status Description    5/15/2017 1041 UA with Microscopic reflex to Culture In process     5/15/2017 1041 Creatinine random urine In process     5/15/2017 1038 Creatinine fluid In process     5/15/2017 0945 Lipoprotein Particle NMR Profile In process     5/15/2017 0945 Tacrolimus level In process             Admission Information     Date & Time Provider Department Dept. Phone    5/15/2017 Leanne Montelongo MD Same Day Surgery [facility] [phone]      Your Vitals Were     Blood Pressure Pulse Temperature  "Respirations Height Weight    99/60 84 98.7  F (37.1  C) (Oral) 16 1.575 m (5' 2\") 48.9 kg (107 lb 12.9 oz)    Last Period Pulse Oximetry BMI (Body Mass Index)             05/15/2017 100% 19.72 kg/m2         [employer] Information     [employer] gives you secure access to your electronic health record. If you see a primary care provider, you can also send messages to your care team and make appointments. If you have questions, please call your primary care clinic.  If you do not have a primary care provider, please call [phone] and they will assist you.        Care EveryWhere ID     This is your Care EveryWhere ID. This could be used by other organizations to access your [facility] medical records  [id number]           Review of your medicines      CONTINUE these medicines which may have CHANGED, or have new prescriptions. If we are uncertain of the size of tablets/capsules you have at home, strength may be listed as something that might have changed.        Dose / Directions    amLODIPine 5 MG tablet   Commonly known as:  NORVASC   This may have changed:  how much to take   Used for:  End stage renal disease (H)        Dose:  5 mg   Take 1 tablet (5 mg) by mouth daily   Quantity:  30 tablet   Refills:  3         CONTINUE these medicines which have NOT CHANGED        Dose / Directions    acetaminophen 325 MG tablet   Commonly known as:  TYLENOL   Used for:  Kidney replaced by transplant        Dose:  650 mg   Take 2 tablets (650 mg) by mouth every 4 hours as needed for mild pain or fever   Quantity:  100 tablet   Refills:  0       cholecalciferol 1000 UNITS capsule   Commonly known as:  vitamin  -D   Used for:  Vitamin D deficiency        Dose:  1 capsule   Take 1 capsule (1,000 Units) by mouth daily   Quantity:  30 capsule   Refills:  11       magnesium oxide 400 MG tablet   Commonly known as:  MAG-OX   Used for:  Hypomagnesemia        Dose:  400 mg   Take 1 tablet (400 mg) by mouth 2 times daily   Quantity:  120 " tablet   Refills:  3       mycophenolic acid 180 MG EC tablet   Commonly known as:  MYFORTIC - GENERIC EQUIVALENT   Used for:  Kidney replaced by transplant        Dose:  540 mg   Take 3 tablets (540 mg) by mouth 2 times daily   Quantity:  240 tablet   Refills:  11       nystatin 657570 unit/mL Susp suspension   Commonly known as:  MYCOSTATIN   Used for:  Kidney replaced by transplant        Dose:  889829 Units   Swish and swallow 5 mLs (500,000 Units) in mouth 4 times daily   Quantity:  240 mL   Refills:  0       ondansetron 4 MG tablet   Commonly known as:  ZOFRAN   Used for:  Kidney replaced by transplant        Dose:  4 mg   Take 1 tablet (4 mg) by mouth every 8 hours as needed for nausea   Quantity:  30 tablet   Refills:  0       senna-docusate 8.6-50 MG per tablet   Commonly known as:  SENOKOT-S;PERICOLACE   Used for:  Kidney replaced by transplant        Dose:  1-2 tablet   Take 1-2 tablets by mouth daily as needed for constipation   Quantity:  100 tablet   Refills:  0       sodium bicarbonate 650 MG tablet   Used for:  Kidney replaced by transplant, Acidosis        Dose:  650 mg   Take 1 tablet (650 mg) by mouth 2 times daily   Quantity:  60 tablet   Refills:  0       sulfamethoxazole-trimethoprim 400-80 MG per tablet   Commonly known as:  BACTRIM/SEPTRA   Indication:  PCP prophylaxis   Used for:  Immunosuppression (H), Kidney replaced by transplant        Dose:  1 tablet   Take 1 tablet by mouth Every Mon, Wed, Fri Morning   Quantity:  12 tablet   Refills:  11       * tacrolimus capsule   Used for:  Kidney replaced by transplant        Dose:  1 mg   Take 1 capsule (1 mg) by mouth 2 times daily   Quantity:  180 capsule   Refills:  11       * tacrolimus 0.5 MG capsule   Commonly known as:  PROGRAF - GENERIC EQUIVALENT   Used for:  Kidney replaced by transplant        Dose:  0.5 mg   Take 1 capsule (0.5 mg) by mouth every 12 hours Use for dose adjustments.   Quantity:  60 capsule   Refills:  11       *  tacrolimus 1 MG capsule   Commonly known as:  PROGRAF - GENERIC EQUIVALENT   Used for:  Kidney replaced by transplant, Immunosuppression (H)        Dose:  4 mg   Take 4 capsules (4 mg) by mouth 2 times daily   Quantity:  100 capsule   Refills:  0       valGANciclovir 450 MG tablet   Commonly known as:  VALCYTE   Indication:  Treatment to Prevent Cytomegalovirus Disease   Used for:  Kidney replaced by transplant, Immunosuppression (H)        Dose:  450 mg   Take 1 tablet (450 mg) by mouth twice a week   Quantity:  8 tablet   Refills:  2       * Notice:  This list has 3 medication(s) that are the same as other medications prescribed for you. Read the directions carefully, and ask your doctor or other care provider to review them with you.             Protect others around you: Learn how to safely use, store and throw away your medicines at www.[employer].org.             Medication List: This is a list of all your medications and when to take them. Check marks below indicate your daily home schedule. Keep this list as a reference.      Medications           Morning Afternoon Evening Bedtime As Needed    acetaminophen 325 MG tablet   Commonly known as:  TYLENOL   Take 2 tablets (650 mg) by mouth every 4 hours as needed for mild pain or fever                                amLODIPine 5 MG tablet   Commonly known as:  NORVASC   Take 1 tablet (5 mg) by mouth daily                                cholecalciferol 1000 UNITS capsule   Commonly known as:  vitamin  -D   Take 1 capsule (1,000 Units) by mouth daily                                magnesium oxide 400 MG tablet   Commonly known as:  MAG-OX   Take 1 tablet (400 mg) by mouth 2 times daily                                mycophenolic acid 180 MG EC tablet   Commonly known as:  MYFORTIC - GENERIC EQUIVALENT   Take 3 tablets (540 mg) by mouth 2 times daily                                nystatin 096685 unit/mL Susp suspension   Commonly known as:  MYCOSTATIN   Swish  and swallow 5 mLs (500,000 Units) in mouth 4 times daily                                ondansetron 4 MG tablet   Commonly known as:  ZOFRAN   Take 1 tablet (4 mg) by mouth every 8 hours as needed for nausea                                senna-docusate 8.6-50 MG per tablet   Commonly known as:  SENOKOT-S;PERICOLACE   Take 1-2 tablets by mouth daily as needed for constipation                                sodium bicarbonate 650 MG tablet   Take 1 tablet (650 mg) by mouth 2 times daily                                sulfamethoxazole-trimethoprim 400-80 MG per tablet   Commonly known as:  BACTRIM/SEPTRA   Take 1 tablet by mouth Every Mon, Wed, Fri Morning                                * tacrolimus capsule   Take 1 capsule (1 mg) by mouth 2 times daily                                * tacrolimus 0.5 MG capsule   Commonly known as:  PROGRAF - GENERIC EQUIVALENT   Take 1 capsule (0.5 mg) by mouth every 12 hours Use for dose adjustments.                                * tacrolimus 1 MG capsule   Commonly known as:  PROGRAF - GENERIC EQUIVALENT   Take 4 capsules (4 mg) by mouth 2 times daily                                valGANciclovir 450 MG tablet   Commonly known as:  VALCYTE   Take 1 tablet (450 mg) by mouth twice a week                                * Notice:  This list has 3 medication(s) that are the same as other medications prescribed for you. Read the directions carefully, and ask your doctor or other care provider to review them with you.

## 2017-05-15 NOTE — NURSING NOTE
"Chief Complaint   Patient presents with     Kidney Transplant     POD 22       Initial /80  Pulse 92  Temp 98.2  F (36.8  C) (Oral)  Resp 16  LMP 05/15/2017  SpO2 100% Estimated body mass index is 19.72 kg/(m^2) as calculated from the following:    Height as of an earlier encounter on 5/15/17: 1.575 m (5' 2\").    Weight as of an earlier encounter on 5/15/17: 48.9 kg (107 lb 12.9 oz).    "

## 2017-05-15 NOTE — LETTER
5/15/2017       RE: Rosibel Willingham  [address] [facility] [address]     Dear Colleague,    Thank you for referring your patient, Rosibel Willingham, to the [facility] SOLID ORGAN TRANSPLANT at [facility]. Please see a copy of my visit note below.    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  Wants OCTAVIO removed.    Transplant History:    Transplant Type:  DDKT  Donor Type:  - Brain Death   Transplant Date:  2017 (Kidney)   Ureteral Stent:  Yes - removed today.    Crossmatch:  negative   DSA at Tx:  No  Baseline Cr: 2.7-3.2   DeNovo DSA:     Acute Rejection Hx:  No    Present Maintenance Immunosuppression:  Tacrolimus and Mycophenolate mofetil    CMV IgG Ab Discordance:    EBV IgG Ab Discordance:      BK Viremia:    EBV Viremia:      Transplant Coordinator: Chinyere Burnham     Transplant Office Phone Number: [phone]        Immunsupresent Medications     Immunosuppressive Agents Disp Start End    tacrolimus (PROGRAF - GENERIC EQUIVALENT) 1 MG capsule 100 capsule 2017     Sig - Route: Take 4 capsules (4 mg) by mouth 2 times daily - Oral    Class: Historical    Notes to Pharmacy: Take with 0.5 mg cap q 12 hours, for total dose of 4.5 mg BID per Dr. Beltran    PROGRAF 1 MG PO CAPSULE 180 capsule 2017     Sig - Route: Take 1 capsule (1 mg) by mouth 2 times daily - Oral    Class: E-Prescribe    Notes to Pharmacy: Take with 0.5 mg po bid total dose 3.5 mg po bid    tacrolimus (PROGRAF - GENERIC EQUIVALENT) 0.5 MG capsule 60 capsule 2017     Sig - Route: Take 1 capsule (0.5 mg) by mouth every 12 hours Use for dose adjustments. - Oral    Class: E-Prescribe    Notes to Pharmacy: Total dose of 3.5 mg po every 12 hours    mycophenolic acid (MYFORTIC - GENERIC EQUIVALENT) 180 MG EC tablet 240 tablet 2017     Sig - Route: Take 3 tablets (540 mg) by mouth 2 times daily - Oral    Class:  E-Prescribe        Possible Immunosuppression-related side effects:   []             headache  []             vivid dreams  []             irritability  []             fine tremor  []             nausea  []             diarrhea  []             neuropathy      []             edema  []             renal calcineurin toxicity  []             hyperkalemia  []             post-transplant diabetes  []             decreased appetite  []             increased appetite  []             other:  []             none    Prescription Medications as of 5/15/2017             tacrolimus (PROGRAF - GENERIC EQUIVALENT) 1 MG capsule Take 4 capsules (4 mg) by mouth 2 times daily    PROGRAF 1 MG PO CAPSULE Take 1 capsule (1 mg) by mouth 2 times daily    tacrolimus (PROGRAF - GENERIC EQUIVALENT) 0.5 MG capsule Take 1 capsule (0.5 mg) by mouth every 12 hours Use for dose adjustments.    amLODIPine (NORVASC) 5 MG tablet Take 1 tablet (5 mg) by mouth daily    nystatin (MYCOSTATIN) 721788 unit/mL SUSP suspension Swish and swallow 5 mLs (500,000 Units) in mouth 4 times daily    mycophenolic acid (MYFORTIC - GENERIC EQUIVALENT) 180 MG EC tablet Take 3 tablets (540 mg) by mouth 2 times daily    sodium bicarbonate 650 MG tablet Take 1 tablet (650 mg) by mouth 2 times daily    cholecalciferol (VITAMIN  -D) 1000 UNITS capsule Take 1 capsule (1,000 Units) by mouth daily    magnesium oxide (MAG-OX) 400 MG tablet Take 1 tablet (400 mg) by mouth 2 times daily    ondansetron (ZOFRAN) 4 MG tablet Take 1 tablet (4 mg) by mouth every 8 hours as needed for nausea    sulfamethoxazole-trimethoprim (BACTRIM/SEPTRA) 400-80 MG per tablet Take 1 tablet by mouth Every Mon, Wed, Fri Morning    valGANciclovir (VALCYTE) 450 MG tablet Take 1 tablet (450 mg) by mouth twice a week    acetaminophen (TYLENOL) 325 MG tablet Take 2 tablets (650 mg) by mouth every 4 hours as needed for mild pain or fever    senna-docusate (SENOKOT-S;PERICOLACE) 8.6-50 MG per tablet Take 1-2  "tablets by mouth daily as needed for constipation      Facility Administered Medications as of 5/15/2017             levofloxacin (LEVAQUIN) infusion 250 mg Inject 50 mLs (250 mg) into the vein once    lactated ringers infusion (Discontinued) Inject into the vein continuous    No Pre Procedure Antibiotic Needed (Discontinued) 1 each continuous    lactated ringers infusion (Discontinued) Inject into the vein continuous    ondansetron (ZOFRAN-ODT) ODT tab 4 mg (Discontinued) Take 1 tablet (4 mg) by mouth every 30 minutes as needed for nausea or vomiting    Linked Group 1:  \"Or\" Linked Group Details     ondansetron (ZOFRAN) injection 4 mg (Discontinued) Inject 2 mLs (4 mg) into the vein every 30 minutes as needed for nausea or vomiting    Linked Group 1:  \"Or\" Linked Group Details     prochlorperazine (COMPAZINE) injection 5-10 mg (Discontinued) Inject 1-2 mLs (5-10 mg) into the vein every 6 hours as needed for nausea or vomiting    meperidine (DEMEROL) injection 12.5 mg (Discontinued) Inject 0.5 mLs (12.5 mg) into the vein every 15 minutes as needed for post anesthesia shivering    naloxone (NARCAN) injection 0.1-0.4 mg (Discontinued) Inject 0.25-1 mLs (0.1-0.4 mg) into the vein every 2 minutes as needed for opioid reversal    lactated ringers infusion (Discontinued) Inject into the vein continuous prn    midazolam (VERSED) injection (Discontinued) Inject into the vein as needed for anxiety    lidocaine injection 2% (MDV) (Discontinued) Inject into the vein as needed    propofol (DIPRIVAN) injection 10 mg/mL vial (Discontinued) Inject into the vein continuous prn    propofol (DIPRIVAN) injection 10 mg/mL vial (Discontinued) Inject into the vein as needed    fentaNYL Citrate (PF) (SUBLIMAZE) injection (Discontinued) Inject into the vein as needed for moderate to severe pain          O:   Temp:  [97.9  F (36.6  C)-98.7  F (37.1  C)] 98.2  F (36.8  C)  Pulse:  [84-92] 92  Resp:  [16-20] 16  BP: ()/(60-87) " 130/80  SpO2:  [98 %-100 %] 100 %  General Appearance: in no apparent distress.   Skin: Normal, no rashes or jaundice  Heart: regular rate and rhythm, normal S1 and S2  Lungs: easy respirations, no audible wheezing.  Abdomen: flat, The wound is dry and intact, without hernia. The abdomen is non-tender. The kidney graft is not tender.  There is no ascites.  Extremities: Tremor absent.   Edema: absent. 1+    Transplant Immunosuppression Labs Latest Ref Rng & Units 5/15/2017 5/14/2017 5/12/2017 5/11/2017 5/9/2017   Tacro Level 5.0 - 15.0 ug/L - 8.1 5.3 7.0 4.3(L)   Tacro Level - - Not Provided 05.11.17 @ 2100 Not Provided Not Provided   Creat 0.52 - 1.04 mg/dL 3.10(H) 2.87(H) 2.90(H) 2.77(H) 3.24(H)   BUN 7 - 30 mg/dL 33(H) 35(H) 34(H) 32(H) 44(H)   WBC 4.0 - 11.0 10e9/L 5.1 4.5 4.8 5.5 6.7   Neutrophil % 84.6 83.4 - 85.8 -   ANEU 1.6 - 8.3 10e9/L 4.3 3.7 - 4.9 -       Chemistries:   Recent Labs   Lab Test  05/15/17   1002   BUN  33*   CR  3.10*   GFRESTIMATED  17*   GLC  88     Lab Results   Component Value Date    A1C 4.0 04/22/2017     Recent Labs   Lab Test  04/22/17   1230   ALBUMIN  4.8   BILITOTAL  0.5   ALKPHOS  56   AST  16   ALT  32     Urine Studies:  Recent Labs   Lab Test  05/15/17   1038   COLOR  Yellow   APPEARANCE  Clear   URINEGLC  Negative   URINEBILI  Negative   URINEKETONE  Negative   SG  1.012   UBLD  Small*   URINEPH  6.5   PROTEIN  30*   NITRITE  Negative   LEUKEST  Negative   RBCU  5*   WBCU  3*     Recent Labs   Lab Test  05/08/17   1145  04/28/17   1110   UTPG  1.24*  3.72*     Hematology:   Recent Labs   Lab Test  05/15/17   1002  05/14/17   0840  05/12/17   0945   HGB  8.4*  8.5*  8.8*   PLT  334  389  377   WBC  5.1  4.5  4.8     Coags:   Recent Labs   Lab Test  04/22/17   1230  12/11/16   0748   INR  1.04  1.11     HLA antibodies:   SA1 Hi Risk Ronda   Date Value Ref Range Status   04/22/2017   Final    A:29 B:13 18 27 35 37 38 39 41 44 45 46 47 48 49 50 51 52 53 54 55 56 57   58 59 60 61 62  63 64 65 67 71 72 75 76 77 78 82 Cw:2 9 10       SA1 Mod Risk Ronda   Date Value Ref Range Status   04/22/2017 A:11 25 43 66 69 B:73 Cw:4 5 6 12 15 17 18  Final     SA2 Hi Risk Ronda   Date Value Ref Range Status   04/22/2017 None  Final     SA2 Mod Risk Ronda   Date Value Ref Range Status   04/22/2017 DR:13  Final       Assessment: Persistently elevated Cr.      Plan:    1. Graft function: persistently elevated Cr.  Plan for biopsy tomorrow.  Dr. Beltran aware.  2. Immunosuppression Management: No change Tac, MMF .  Complexity of management:Medium.  Contributing factors: potential rejection and delayed graft function  3. Will give 1 L fluid bolus today.  4. Will make decision on OCTAVIO after biopsy.    Followup: will follow up after biopsy      Bebeto Duran MD  Transplant Surgery Fellow  Pager: [phone]    I have reviewed history, examined patient and discussed plan with the fellow/resident/OG.  I concur with the findings in this note.       Immunosuppressive regimen, management and long term risks discussed in detail. Changes, when applicable made as per orders.      Total time: 15 min  Counseling time: 10 min

## 2017-05-15 NOTE — ANESTHESIA PREPROCEDURE EVALUATION
Anesthesia Evaluation     . Pt has had prior anesthetic. Type: General    No history of anesthetic complications          ROS/MED HX    ENT/Pulmonary:       Neurologic:       Cardiovascular:     (+) hypertension----. : . . . :. .       METS/Exercise Tolerance:     Hematologic:         Musculoskeletal:         GI/Hepatic:         Renal/Genitourinary:     (+) chronic renal disease, Pt does not require dialysis, Pt has history of transplant, date: 2017,       Endo:         Psychiatric:         Infectious Disease:         Malignancy:         Other:                     Physical Exam  Normal systems: cardiovascular, pulmonary and dental    Airway   Mallampati: I  TM distance: >3 FB  Neck ROM: full    Dental     Cardiovascular       Pulmonary                     Anesthesia Plan      History & Physical Review  History and physical reviewed and following examination; no interval change.    ASA Status:  3 .    NPO Status:  > 8 hours    Plan for MAC with Intravenous induction. Maintenance will be TIVA.  Reason for MAC:  Deep or markedly invasive procedure (G8)  PONV prophylaxis:  Ondansetron (or other 5HT-3)       Postoperative Care  Postoperative pain management:  IV analgesics.      Consents  Anesthetic plan, risks, benefits and alternatives discussed with:  Patient.  Use of blood products discussed: No .   .                          .

## 2017-05-15 NOTE — MR AVS SNAPSHOT
After Visit Summary   5/15/2017    Rosibel Willingham    MRN: [id number]           Patient Information     Date Of Birth          1975        Visit Information        Provider Department      5/15/2017 3:00 PM UC 43 ATC; UC SPEC INFUSION [facility] Specialty and Procedure        Today's Diagnoses     Dehydration    -  1    Kidney replaced by transplant           Follow-ups after your visit        Your next 10 appointments already scheduled     May 16, 2017  8:15 AM CDT   LAB with  LAB   [facility] Lab ([facility])    [address] [place] [address]  [address] [place] [address] [phone]           Patient must bring picture ID.  Patient should be prepared to give a urine specimen  Please do not eat 10-12 hours before your appointment if you are coming in fasting for labs on lipids, cholesterol, or glucose (sugar).  Pregnant women should follow their Care Team instructions. Water with medications is okay. Do not drink coffee or other fluids.   If you have concerns about taking  your medications, please ask at office or if scheduling via [employer], send a message by clicking on Secure Messaging, Message Your Care Team.            May 16, 2017   Procedure with Sid Blankenship MD   [facility] ([facility])    [address] [place] [address]  [address] [facility] [id number] [phone]           Located in the Clinics and Surgery Center at [address] [place] [address].   parking is very convenient and highly recommended.  is a $6 flat rate fee.  Both  and self parkers should enter the main arrival plaza from [facility]; parking attendants will direct you based on your parking preference.            May 22, 2017  1:00 PM CDT   (Arrive by 12:30 PM)   Return Kidney Transplant with Benjamin Beltran MD   [facility] Nephrology ([facility] [place])    [address] [place] [address]  [address] [facility] [address]   [phone]            Jul 24, 2017  1:10 PM CDT   (Arrive by 12:40 PM)   Return Kidney Transplant with Benjamin Beltran MD   [facility]rology ([facility])    [address] [facility]  [address] [facility] [address]   [phone]              Future tests that were ordered for you today     Open Future Orders        Priority Expected Expires Ordered    Protein  random urine Routine  11/11/2017 5/15/2017    BK virus PCR quantitative Routine  11/11/2017 5/15/2017    Tacrolimus level Routine  11/11/2017 5/15/2017    PRA Donor Specific Antibody STAT  11/11/2017 5/15/2017    Basic metabolic panel Routine  11/11/2017 5/15/2017    CBC with platelets differential Routine  11/11/2017 5/15/2017    INR Routine  11/11/2017 5/15/2017    ABO/Rh type and screen Routine  11/11/2017 5/15/2017    CMV DNA quantification Routine  11/11/2017 5/15/2017    Routine UA with microscopic Routine  11/11/2017 5/15/2017    Albumin Random Urine Quantitative Routine  11/11/2017 5/15/2017            Who to contact     If you have questions or need follow up information about today's clinic visit or your schedule please contact [facility] [place] [facility]ED[facility] directly at [phone].  Normal or non-critical lab and imaging results will be communicated to you by [employer], letter or phone within 4 business days after the clinic has received the results. If you do not hear from us within 7 days, please contact the clinic through [employer] or phone. If you have a critical or abnormal lab result, we will notify you by phone as soon as possible.  Submit refill requests through [employer] or call your pharmacy and they will forward the refill request to us. Please allow 3 business days for your refill to be completed.          Additional Information About Your Visit        [employer] Information     [employer] gives you secure access to your electronic health record.  If you see a primary care provider, you can also send messages to your care team and make appointments. If you have questions, please call your primary care clinic.  If you do not have a primary care provider, please call [phone] and they will assist you.        Care EveryWhere ID     This is your Care EveryWhere ID. This could be used by other organizations to access your [place] medical records  [id number]        Your Vitals Were     Last Period                   05/15/2017            Blood Pressure from Last 3 Encounters:   05/15/17 130/80   05/15/17 130/87   05/11/17 (P) 120/76    Weight from Last 3 Encounters:   05/15/17 48.9 kg (107 lb 12.9 oz)   05/14/17 49.2 kg (108 lb 8 oz)   05/08/17 48.6 kg (107 lb 3.2 oz)              We Performed the Following     MD Instruction for Therapy Plan          Today's Medication Changes          These changes are accurate as of: 5/15/17  5:32 PM.  If you have any questions, ask your nurse or doctor.               These medicines have changed or have updated prescriptions.        Dose/Directions    amLODIPine 5 MG tablet   Commonly known as:  NORVASC   This may have changed:  how much to take   Used for:  End stage renal disease (H)        Dose:  5 mg   Take 1 tablet (5 mg) by mouth daily   Quantity:  30 tablet   Refills:  3                Primary Care Provider    Physician No Ref-Primary       No address on file        Thank you!     Thank you for choosing [facility] SPECIALTY AND PROCEDURE  for your care. Our goal is always to provide you with excellent care. Hearing back from our patients is one way we can continue to improve our services. Please take a few minutes to complete the written survey that you may receive in the mail after your visit with us. Thank you!             Your Updated Medication List - Protect others around you: Learn how to safely use, store and throw away your medicines at www.disposemymeds.org.          This list is  accurate as of: 5/15/17  5:32 PM.  Always use your most recent med list.                   Brand Name Dispense Instructions for use    acetaminophen 325 MG tablet    TYLENOL    100 tablet    Take 2 tablets (650 mg) by mouth every 4 hours as needed for mild pain or fever       amLODIPine 5 MG tablet    NORVASC    30 tablet    Take 1 tablet (5 mg) by mouth daily       cholecalciferol 1000 UNITS capsule    vitamin  -D    30 capsule    Take 1 capsule (1,000 Units) by mouth daily       magnesium oxide 400 MG tablet    MAG-OX    120 tablet    Take 1 tablet (400 mg) by mouth 2 times daily       mycophenolic acid 180 MG EC tablet    MYFORTIC - GENERIC EQUIVALENT    240 tablet    Take 3 tablets (540 mg) by mouth 2 times daily       nystatin 714455 unit/mL Susp suspension    MYCOSTATIN    240 mL    Swish and swallow 5 mLs (500,000 Units) in mouth 4 times daily       ondansetron 4 MG tablet    ZOFRAN    30 tablet    Take 1 tablet (4 mg) by mouth every 8 hours as needed for nausea       senna-docusate 8.6-50 MG per tablet    SENOKOT-S;PERICOLACE    100 tablet    Take 1-2 tablets by mouth daily as needed for constipation       sodium bicarbonate 650 MG tablet     60 tablet    Take 1 tablet (650 mg) by mouth 2 times daily       sulfamethoxazole-trimethoprim 400-80 MG per tablet    BACTRIM/SEPTRA    12 tablet    Take 1 tablet by mouth Every Mon, Wed, Fri Morning       * tacrolimus capsule     180 capsule    Take 1 capsule (1 mg) by mouth 2 times daily       * tacrolimus 0.5 MG capsule    PROGRAF - GENERIC EQUIVALENT    60 capsule    Take 1 capsule (0.5 mg) by mouth every 12 hours Use for dose adjustments.       * tacrolimus 1 MG capsule    PROGRAF - GENERIC EQUIVALENT    100 capsule    Take 4 capsules (4 mg) by mouth 2 times daily       valGANciclovir 450 MG tablet    VALCYTE    8 tablet    Take 1 tablet (450 mg) by mouth twice a week       * Notice:  This list has 3 medication(s) that are the same as other medications prescribed  for you. Read the directions carefully, and ask your doctor or other care provider to review them with you.

## 2017-05-15 NOTE — MR AVS SNAPSHOT
After Visit Summary   5/15/2017    Rosibel Willingham    MRN: [id number]           Patient Information     Date Of Birth          1975        Visit Information        Provider Department      5/15/2017 2:00 PM Leanne Montelongo MD [facility] Solid Organ Transplant        Today's Diagnoses     Kidney replaced by transplant    -  1       Follow-ups after your visit        Your next 10 appointments already scheduled     May 16, 2017  8:15 AM CDT   LAB with  LAB   [facility] Lab ([facility])    [address] [place] [address]  [facility] [address] [phone]           Patient must bring picture ID.  Patient should be prepared to give a urine specimen  Please do not eat 10-12 hours before your appointment if you are coming in fasting for labs on lipids, cholesterol, or glucose (sugar).  Pregnant women should follow their Care Team instructions. Water with medications is okay. Do not drink coffee or other fluids.   If you have concerns about taking  your medications, please ask at office or if scheduling via [employer], send a message by clicking on Secure Messaging, Message Your Care Team.            May 16, 2017   Procedure with Sid Blankenship MD   [facility] ([facility])    [address] [place] [address]  [address] [facility] [address] [phone]           Located in the Clinics and Surgery Center at [address] [place] [address].   parking is very convenient and highly recommended.  is a $6 flat rate fee.  Both  and self parkers should enter the main arrival plaza from [facility]; parking attendants will direct you based on your parking preference.            May 22, 2017  1:00 PM CDT   (Arrive by 12:30 PM)   Return Kidney Transplant with Benjamin Beltran MD   [facility] Nephrology ([facility])    [address] [place] [address]  [facility] [address]    [phone]            Jul 24, 2017  1:10 PM CDT   (Arrive by 12:40 PM)   Return Kidney Transplant with Benjamin Beltran MD   [facility] Nephrology ([facility])    [address] [id number] [phone]              Future tests that were ordered for you today     Open Future Orders        Priority Expected Expires Ordered    Protein  random urine Routine  11/11/2017 5/15/2017    BK virus PCR quantitative Routine  11/11/2017 5/15/2017    Tacrolimus level Routine  11/11/2017 5/15/2017    PRA Donor Specific Antibody STAT  11/11/2017 5/15/2017    Basic metabolic panel Routine  11/11/2017 5/15/2017    CBC with platelets differential Routine  11/11/2017 5/15/2017    INR Routine  11/11/2017 5/15/2017    ABO/Rh type and screen Routine  11/11/2017 5/15/2017    CMV DNA quantification Routine  11/11/2017 5/15/2017    Routine UA with microscopic Routine  11/11/2017 5/15/2017    Albumin Random Urine Quantitative Routine  11/11/2017 5/15/2017            Who to contact     If you have questions or need follow up information about today's clinic visit or your schedule please contact [facility] directly at [phone].  Normal or non-critical lab and imaging results will be communicated to you by FlexMinderhart, letter or phone within 4 business days after the clinic has received the results. If you do not hear from us within 7 days, please contact the clinic through [employer] or phone. If you have a critical or abnormal lab result, we will notify you by phone as soon as possible.  Submit refill requests through Orange[employer]M or call your pharmacy and they will forward the refill request to us. Please allow 3 business days for your refill to be completed.          Additional Information About Your Visit        Orange[employer]M Information     Or[employer] gives you secure access to your electronic health record. If you see a primary care provider, you can also send messages to  your care team and make appointments. If you have questions, please call your primary care clinic.  If you do not have a primary care provider, please call [phone] and they will assist you.        Care EveryWhere ID     This is your Care EveryWhere ID. This could be used by other organizations to access your [place] medical records  [id number]        Your Vitals Were     Pulse Temperature Respirations Last Period Pulse Oximetry       92 98.2  F (36.8  C) (Oral) 16 05/15/2017 100%        Blood Pressure from Last 3 Encounters:   05/15/17 130/80   05/15/17 130/87   05/11/17 (P) 120/76    Weight from Last 3 Encounters:   05/15/17 48.9 kg (107 lb 12.9 oz)   05/14/17 49.2 kg (108 lb 8 oz)   05/08/17 48.6 kg (107 lb 3.2 oz)              We Performed the Following     NORMAL SALINE SOLUTION INFUS          Today's Medication Changes          These changes are accurate as of: 5/15/17  5:32 PM.  If you have any questions, ask your nurse or doctor.               These medicines have changed or have updated prescriptions.        Dose/Directions    amLODIPine 5 MG tablet   Commonly known as:  NORVASC   This may have changed:  how much to take   Used for:  End stage renal disease (H)        Dose:  5 mg   Take 1 tablet (5 mg) by mouth daily   Quantity:  30 tablet   Refills:  3                Primary Care Provider    Physician No Ref-Primary       No address on file        Thank you!     Thank you for choosing [facility]  for your care. Our goal is always to provide you with excellent care. Hearing back from our patients is one way we can continue to improve our services. Please take a few minutes to complete the written survey that you may receive in the mail after your visit with us. Thank you!             Your Updated Medication List - Protect others around you: Learn how to safely use, store and throw away your medicines at www.disposemymeds.org.          This list is accurate as of: 5/15/17  5:32  PM.  Always use your most recent med list.                   Brand Name Dispense Instructions for use    acetaminophen 325 MG tablet    TYLENOL    100 tablet    Take 2 tablets (650 mg) by mouth every 4 hours as needed for mild pain or fever       amLODIPine 5 MG tablet    NORVASC    30 tablet    Take 1 tablet (5 mg) by mouth daily       cholecalciferol 1000 UNITS capsule    vitamin  -D    30 capsule    Take 1 capsule (1,000 Units) by mouth daily       magnesium oxide 400 MG tablet    MAG-OX    120 tablet    Take 1 tablet (400 mg) by mouth 2 times daily       mycophenolic acid 180 MG EC tablet    MYFORTIC - GENERIC EQUIVALENT    240 tablet    Take 3 tablets (540 mg) by mouth 2 times daily       nystatin 992564 unit/mL Susp suspension    MYCOSTATIN    240 mL    Swish and swallow 5 mLs (500,000 Units) in mouth 4 times daily       ondansetron 4 MG tablet    ZOFRAN    30 tablet    Take 1 tablet (4 mg) by mouth every 8 hours as needed for nausea       senna-docusate 8.6-50 MG per tablet    SENOKOT-S;PERICOLACE    100 tablet    Take 1-2 tablets by mouth daily as needed for constipation       sodium bicarbonate 650 MG tablet     60 tablet    Take 1 tablet (650 mg) by mouth 2 times daily       sulfamethoxazole-trimethoprim 400-80 MG per tablet    BACTRIM/SEPTRA    12 tablet    Take 1 tablet by mouth Every Mon, Wed, Fri Morning       * tacrolimus capsule     180 capsule    Take 1 capsule (1 mg) by mouth 2 times daily       * tacrolimus 0.5 MG capsule    PROGRAF - GENERIC EQUIVALENT    60 capsule    Take 1 capsule (0.5 mg) by mouth every 12 hours Use for dose adjustments.       * tacrolimus 1 MG capsule    PROGRAF - GENERIC EQUIVALENT    100 capsule    Take 4 capsules (4 mg) by mouth 2 times daily       valGANciclovir 450 MG tablet    VALCYTE    8 tablet    Take 1 tablet (450 mg) by mouth twice a week       * Notice:  This list has 3 medication(s) that are the same as other medications prescribed for you. Read the directions  carefully, and ask your doctor or other care provider to review them with you.

## 2017-05-15 NOTE — ANESTHESIA POSTPROCEDURE EVALUATION
Patient: Rosibel Willingham    Procedure(s):  Cystoscopy, Right Stent Removal  - Wound Class: II-Clean Contaminated    Diagnosis:Post Kidney Transplant   Diagnosis Additional Information: No value filed.    Anesthesia Type:  MAC    Note:  Anesthesia Post Evaluation    Patient location during evaluation: Phase 2  Patient participation: Able to fully participate in evaluation  Level of consciousness: awake  Pain management: adequate  Airway patency: patent  Cardiovascular status: acceptable  Respiratory status: acceptable  Hydration status: acceptable  PONV: none     Anesthetic complications: None          Last vitals:  Vitals:    05/15/17 0919 05/15/17 1055   BP: 125/75 99/60   Pulse: 84    Resp: 16 16   Temp: 37.1  C (98.7  F)    SpO2: 99% 100%         Electronically Signed By: Edi Antunez MD  May 15, 2017  11:21 AM

## 2017-05-16 ENCOUNTER — RESULTS ONLY (OUTPATIENT)
Dept: OTHER | Facility: CLINIC | Age: 42
End: 2017-05-16

## 2017-05-16 ENCOUNTER — HOSPITAL ENCOUNTER (OUTPATIENT)
Facility: AMBULATORY SURGERY CENTER | Age: 42
End: 2017-05-16
Attending: INTERNAL MEDICINE

## 2017-05-16 ENCOUNTER — SURGERY (OUTPATIENT)
Age: 42
End: 2017-05-16

## 2017-05-16 VITALS
SYSTOLIC BLOOD PRESSURE: 146 MMHG | RESPIRATION RATE: 16 BRPM | OXYGEN SATURATION: 100 % | DIASTOLIC BLOOD PRESSURE: 87 MMHG | TEMPERATURE: 97.8 F | HEIGHT: 62 IN | BODY MASS INDEX: 19.69 KG/M2 | WEIGHT: 107 LBS

## 2017-05-16 DIAGNOSIS — Z94.0 KIDNEY TRANSPLANTED: ICD-10-CM

## 2017-05-16 DIAGNOSIS — Z48.298 AFTERCARE FOLLOWING ORGAN TRANSPLANT: ICD-10-CM

## 2017-05-16 DIAGNOSIS — Z94.0 KIDNEY REPLACED BY TRANSPLANT: ICD-10-CM

## 2017-05-16 LAB
ABO + RH BLD: NORMAL
ABO + RH BLD: NORMAL
ALBUMIN UR-MCNC: 30 MG/DL
ANION GAP SERPL CALCULATED.3IONS-SCNC: 8 MMOL/L (ref 3–14)
APPEARANCE UR: CLEAR
BASOPHILS # BLD AUTO: 0 10E9/L (ref 0–0.2)
BASOPHILS NFR BLD AUTO: 0.9 %
BILIRUB UR QL STRIP: NEGATIVE
BLD GP AB SCN SERPL QL: NORMAL
BLOOD BANK CMNT PATIENT-IMP: NORMAL
BUN SERPL-MCNC: 33 MG/DL (ref 7–30)
CALCIUM SERPL-MCNC: 9.7 MG/DL (ref 8.5–10.1)
CHLORIDE SERPL-SCNC: 112 MMOL/L (ref 94–109)
CO2 SERPL-SCNC: 22 MMOL/L (ref 20–32)
COLOR UR AUTO: YELLOW
CREAT SERPL-MCNC: 2.86 MG/DL (ref 0.52–1.04)
CREAT UR-MCNC: 73 MG/DL
DIFFERENTIAL METHOD BLD: ABNORMAL
EOSINOPHIL # BLD AUTO: 0.1 10E9/L (ref 0–0.7)
EOSINOPHIL NFR BLD AUTO: 2.6 %
ERYTHROCYTE [DISTWIDTH] IN BLOOD BY AUTOMATED COUNT: 15.9 % (ref 10–15)
GFR SERPL CREATININE-BSD FRML MDRD: 18 ML/MIN/1.7M2
GLUCOSE SERPL-MCNC: 85 MG/DL (ref 70–99)
GLUCOSE UR STRIP-MCNC: NEGATIVE MG/DL
HCT VFR BLD AUTO: 25.4 % (ref 35–47)
HGB BLD-MCNC: 8 G/DL (ref 11.7–15.7)
HGB BLD-MCNC: 8.2 G/DL (ref 11.7–15.7)
HGB UR QL STRIP: ABNORMAL
HYALINE CASTS #/AREA URNS LPF: 1 /LPF (ref 0–2)
IMM GRANULOCYTES # BLD: 0 10E9/L (ref 0–0.4)
IMM GRANULOCYTES NFR BLD: 0.6 %
INR PPP: 1.06 (ref 0.86–1.14)
KETONES UR STRIP-MCNC: NEGATIVE MG/DL
LEUKOCYTE ESTERASE UR QL STRIP: NEGATIVE
LYMPHOCYTES # BLD AUTO: 0.4 10E9/L (ref 0.8–5.3)
LYMPHOCYTES NFR BLD AUTO: 10.9 %
MAGNESIUM SERPL-MCNC: 1.7 MG/DL (ref 1.6–2.3)
MCH RBC QN AUTO: 29.7 PG (ref 26.5–33)
MCHC RBC AUTO-ENTMCNC: 32.3 G/DL (ref 31.5–36.5)
MCV RBC AUTO: 92 FL (ref 78–100)
MICROALBUMIN UR-MCNC: 164 MG/L
MICROALBUMIN/CREAT UR: 224.66 MG/G CR (ref 0–25)
MONOCYTES # BLD AUTO: 0.2 10E9/L (ref 0–1.3)
MONOCYTES NFR BLD AUTO: 6.9 %
MUCOUS THREADS #/AREA URNS LPF: PRESENT /LPF
NEUTROPHILS # BLD AUTO: 2.7 10E9/L (ref 1.6–8.3)
NEUTROPHILS NFR BLD AUTO: 78.1 %
NITRATE UR QL: NEGATIVE
NRBC # BLD AUTO: 0 10*3/UL
NRBC BLD AUTO-RTO: 0 /100
PH UR STRIP: 6 PH (ref 5–7)
PHOSPHATE SERPL-MCNC: 2.4 MG/DL (ref 2.5–4.5)
PLATELET # BLD AUTO: 353 10E9/L (ref 150–450)
POTASSIUM SERPL-SCNC: 4.6 MMOL/L (ref 3.4–5.3)
PROT UR-MCNC: 0.53 G/L
PROT/CREAT 24H UR: 0.72 G/G CR (ref 0–0.2)
RBC # BLD AUTO: 2.76 10E12/L (ref 3.8–5.2)
RBC #/AREA URNS AUTO: 3 /HPF (ref 0–2)
SODIUM SERPL-SCNC: 143 MMOL/L (ref 133–144)
SP GR UR STRIP: 1.01 (ref 1–1.03)
SPECIMEN EXP DATE BLD: NORMAL
SQUAMOUS #/AREA URNS AUTO: 1 /HPF (ref 0–1)
TACROLIMUS BLD-MCNC: 8.6 UG/L (ref 5–15)
TME LAST DOSE: NORMAL H
URN SPEC COLLECT METH UR: ABNORMAL
UROBILINOGEN UR STRIP-MCNC: 0 MG/DL (ref 0–2)
WBC # BLD AUTO: 3.5 10E9/L (ref 4–11)
WBC #/AREA URNS AUTO: 2 /HPF (ref 0–2)

## 2017-05-16 RX ADMIN — Medication 10 ML: at 09:42

## 2017-05-16 NOTE — IP AVS SNAPSHOT
MRN:[id number]                      After Visit Summary   5/16/2017    Rosibel Willingham    MRN: [id number]           Thank you!     Thank you for choosing [facility] for your care. Our goal is always to provide you with excellent care. Hearing back from our patients is one way we can continue to improve our services. Please take a few minutes to complete the written survey that you may receive in the mail after you visit with us. Thank you!        Patient Information     Date Of Birth          1975        About your hospital stay     You were [facility] on:  May 16, 2017 [facility]    You were discharged on:  May 16, 2017       Who to Call     For medical emergencies, please call 911.  For non-urgent questions about your medical care, please call your primary care provider or clinic, None  For questions related to your surgery, please call your surgery clinic        Attending Provider     Provider Sid Floyd MD Nephrology       Primary Care Provider    Physician No Ref-Primary       No address on file        After Care Instructions     Discharge Instructions       ACTIVITY:  NO heavy lifting (weight greater than 10 pounds) for 1 week. NO strenuous activity for 24 hours; relax and take it easy.     DIET:  Resume your regular diet and drink plenty of fluids UNLESS you are fluid restricted.    DRAINAGE: There should be minimal drainage from the biopsy site. If bleeding soaks the dressing, you should lie down and apply pressure to the site for a minimum of 10 minutes.  If the bleeding persists, call the emergency phone numbers below.  Whether bleeding persists or not, you should report the bleeding to one of the numbers below.     DRESSING: Keep the dressing dry and in place for 24 hours to prevent the site from reopening and bleeding.    SEDATION: If you received sedation medications, DO NOT drive or operate heavy  "machinery, DO NOT drink alcoholic beverages, or DO NOT make important legal decisions.    NOTIFY PROVIDER for the following signs/symptoms:  Excessive bleeding or drainage.  Excessive swelling, redness, or tenderness at the site.  Fever above 101.5* F  Severe Pain.  Drainage that is green, yellow,  thick white or has a bad odor.  Passage of bloody urine or clots after you are discharged.    EMERGENCY CONTACT NUMBERS:  Emergency Department: [phone]  Transplant Center: [phone]  7:00 AM-6:30 PM                  Your next 10 appointments already scheduled     May 22, 2017  1:00 PM CDT   (Arrive by 12:30 PM)   Return Kidney Transplant with Bnejamin Beltran MD   [facility] Nephrology ([facility])    [address] [place] [address] [phone]            Jul 24, 2017  1:10 PM CDT   (Arrive by 12:40 PM)   Return Kidney Transplant with Benjamin Beltran MD   [facility] Nephrology ([facility])    [address] [place] [address] [phone]              Pending Results     Date and Time Order Name Status Description    5/16/2017 0955 Surgical pathology exam In process     5/16/2017 0832 BK VIRUS PCR QUANTITATIVE In process     5/16/2017 0832 CMV DNA QUANTIFICATION In process     5/16/2017 0832 PRA DONOR SPECIFIC ANTIBODY In process     5/16/2017 0830 TACROLIMUS LEVEL In process     5/16/2017 0830 MYCOPHENOLIC ACID In process     5/15/2017 0945 Lipoprotein Particle NMR Profile In process             Admission Information     Date & Time Provider Department Dept. Phone    5/16/2017 Sid Blankenship MD [facility] [phone]      Your Vitals Were     Blood Pressure Temperature Respirations Height Weight Last Period    129/86 97.8  F (36.6  C) (Temporal) 16 1.575 m (5' 2\") 48.5 kg (107 lb) 05/15/2017    Pulse Oximetry BMI (Body Mass Index)                100% 19.57 kg/m2        "   [employer] Information     [employer] gives you secure access to your electronic health record. If you see a primary care provider, you can also send messages to your care team and make appointments. If you have questions, please call your primary care clinic.  If you do not have a primary care provider, please call [phone] and they will assist you.      Corso12 is an electronic gateway that provides easy, online access to your medical records. With [employer], you can request a clinic appointment, read your test results, renew a prescription or communicate with your care team.     To access your existing account, please contact your [facility] or call [phone] for assistance.        Care EveryWhere ID     This is your Care EveryWhere ID. This could be used by other organizations to access your [place] medical records  [id number]           Review of your medicines      CONTINUE these medicines which may have CHANGED, or have new prescriptions. If we are uncertain of the size of tablets/capsules you have at home, strength may be listed as something that might have changed.        Dose / Directions    amLODIPine 5 MG tablet   Commonly known as:  NORVASC   This may have changed:  how much to take   Used for:  End stage renal disease (H)        Dose:  5 mg   Take 1 tablet (5 mg) by mouth daily   Quantity:  30 tablet   Refills:  3         CONTINUE these medicines which have NOT CHANGED        Dose / Directions    acetaminophen 325 MG tablet   Commonly known as:  TYLENOL   Used for:  Kidney replaced by transplant        Dose:  650 mg   Take 2 tablets (650 mg) by mouth every 4 hours as needed for mild pain or fever   Quantity:  100 tablet   Refills:  0       cholecalciferol 1000 UNITS capsule   Commonly known as:  vitamin  -D   Used for:  Vitamin D deficiency        Dose:  1 capsule   Take 1 capsule (1,000 Units) by mouth daily   Quantity:  30 capsule   Refills:  11       magnesium oxide  400 MG tablet   Commonly known as:  MAG-OX   Used for:  Hypomagnesemia        Dose:  400 mg   Take 1 tablet (400 mg) by mouth 2 times daily   Quantity:  120 tablet   Refills:  3       mycophenolic acid 180 MG EC tablet   Commonly known as:  MYFORTIC - GENERIC EQUIVALENT   Used for:  Kidney replaced by transplant        Dose:  540 mg   Take 3 tablets (540 mg) by mouth 2 times daily   Quantity:  240 tablet   Refills:  11       nystatin 418224 unit/mL Susp suspension   Commonly known as:  MYCOSTATIN   Used for:  Kidney replaced by transplant        Dose:  898892 Units   Swish and swallow 5 mLs (500,000 Units) in mouth 4 times daily   Quantity:  240 mL   Refills:  0       ondansetron 4 MG tablet   Commonly known as:  ZOFRAN   Used for:  Kidney replaced by transplant        Dose:  4 mg   Take 1 tablet (4 mg) by mouth every 8 hours as needed for nausea   Quantity:  30 tablet   Refills:  0       senna-docusate 8.6-50 MG per tablet   Commonly known as:  SENOKOT-S;PERICOLACE   Used for:  Kidney replaced by transplant        Dose:  1-2 tablet   Take 1-2 tablets by mouth daily as needed for constipation   Quantity:  100 tablet   Refills:  0       sodium bicarbonate 650 MG tablet   Used for:  Kidney replaced by transplant, Acidosis        Dose:  650 mg   Take 1 tablet (650 mg) by mouth 2 times daily   Quantity:  60 tablet   Refills:  0       sulfamethoxazole-trimethoprim 400-80 MG per tablet   Commonly known as:  BACTRIM/SEPTRA   Indication:  PCP prophylaxis   Used for:  Immunosuppression (H), Kidney replaced by transplant        Dose:  1 tablet   Take 1 tablet by mouth Every Mon, Wed, Fri Morning   Quantity:  12 tablet   Refills:  11       * tacrolimus capsule   Used for:  Kidney replaced by transplant        Dose:  1 mg   Take 1 capsule (1 mg) by mouth 2 times daily   Quantity:  180 capsule   Refills:  11       * tacrolimus 0.5 MG capsule   Commonly known as:  PROGRAF - GENERIC EQUIVALENT   Used for:  Kidney replaced by  transplant        Dose:  0.5 mg   Take 1 capsule (0.5 mg) by mouth every 12 hours Use for dose adjustments.   Quantity:  60 capsule   Refills:  11       * tacrolimus 1 MG capsule   Commonly known as:  PROGRAF - GENERIC EQUIVALENT   Used for:  Kidney replaced by transplant, Immunosuppression (H)        Dose:  4 mg   Take 4 capsules (4 mg) by mouth 2 times daily   Quantity:  100 capsule   Refills:  0       valGANciclovir 450 MG tablet   Commonly known as:  VALCYTE   Indication:  Treatment to Prevent Cytomegalovirus Disease   Used for:  Kidney replaced by transplant, Immunosuppression (H)        Dose:  450 mg   Take 1 tablet (450 mg) by mouth twice a week   Quantity:  8 tablet   Refills:  2       * Notice:  This list has 3 medication(s) that are the same as other medications prescribed for you. Read the directions carefully, and ask your doctor or other care provider to review them with you.             Protect others around you: Learn how to safely use, store and throw away your medicines at www.disposemymeds.org.             Medication List: This is a list of all your medications and when to take them. Check marks below indicate your daily home schedule. Keep this list as a reference.      Medications           Morning Afternoon Evening Bedtime As Needed    acetaminophen 325 MG tablet   Commonly known as:  TYLENOL   Take 2 tablets (650 mg) by mouth every 4 hours as needed for mild pain or fever                                amLODIPine 5 MG tablet   Commonly known as:  NORVASC   Take 1 tablet (5 mg) by mouth daily                                cholecalciferol 1000 UNITS capsule   Commonly known as:  vitamin  -D   Take 1 capsule (1,000 Units) by mouth daily                                magnesium oxide 400 MG tablet   Commonly known as:  MAG-OX   Take 1 tablet (400 mg) by mouth 2 times daily                                mycophenolic acid 180 MG EC tablet   Commonly known as:  MYFORTIC - GENERIC EQUIVALENT   Take 3  tablets (540 mg) by mouth 2 times daily                                nystatin 332953 unit/mL Susp suspension   Commonly known as:  MYCOSTATIN   Swish and swallow 5 mLs (500,000 Units) in mouth 4 times daily                                ondansetron 4 MG tablet   Commonly known as:  ZOFRAN   Take 1 tablet (4 mg) by mouth every 8 hours as needed for nausea                                senna-docusate 8.6-50 MG per tablet   Commonly known as:  SENOKOT-S;PERICOLACE   Take 1-2 tablets by mouth daily as needed for constipation                                sodium bicarbonate 650 MG tablet   Take 1 tablet (650 mg) by mouth 2 times daily                                sulfamethoxazole-trimethoprim 400-80 MG per tablet   Commonly known as:  BACTRIM/SEPTRA   Take 1 tablet by mouth Every Mon, Wed, Fri Morning                                * tacrolimus capsule   Take 1 capsule (1 mg) by mouth 2 times daily                                * tacrolimus 0.5 MG capsule   Commonly known as:  PROGRAF - GENERIC EQUIVALENT   Take 1 capsule (0.5 mg) by mouth every 12 hours Use for dose adjustments.                                * tacrolimus 1 MG capsule   Commonly known as:  PROGRAF - GENERIC EQUIVALENT   Take 4 capsules (4 mg) by mouth 2 times daily                                valGANciclovir 450 MG tablet   Commonly known as:  VALCYTE   Take 1 tablet (450 mg) by mouth twice a week                                * Notice:  This list has 3 medication(s) that are the same as other medications prescribed for you. Read the directions carefully, and ask your doctor or other care provider to review them with you.

## 2017-05-16 NOTE — H&P
Nephrology Procedure H&P  05/16/2017     Assessment & Recommendations:   1. DDKT - baseline creatinine ~ TBD, which has continues to remain elevated. Mild proteinuria. Will plan on kidney transplant biopsy to evaluate for etiology of persistently elevated creatinine.  Possible causes include, but not limited to, acute rejection, ATN versus recurrent disease. Will make no changes in immunosuppression.    Transplant History:  Transplant: 4/23/2017 (Kidney)        Donor Class:   Crossmatch at time of Transplant:    DSA at time of Transplant:  No  Present Maintenance Immunosuppression:  Tacrolimus and Mycophenolate mofetil  Baseline creatinine:  TBD  Latest DSA lab date:  PRA:  Class I:   SA1 Comments   Date Value Ref Range Status   04/22/2017   Final     Test performed by modified procedure. Serum heat inactivated. High-risk,   mfi >3,000. Mod-risk, mfi 500-3,000.         Class II:    SA2 Comments   Date Value Ref Range Status   04/22/2017   Final     Test performed by modified procedure. Serum heat inactivated. High-risk,   mfi >3,000. Mod-risk, mfi 500-3,000.       Biopsy:  No  Rejection History:  No  Significant Complications: None  Transplant Coordinator: Chinyere Burnham   Transplant Office Phone Number:  [phone]     2. Hypertension - well controlled at target of less than 140/90. No changes.    Reason for Visit:  Ms. Willingham is here for elevated creatinine and potential kidney transplant biopsy.    History of Present Illness:  Rosibel Willingham is a 41 year old female with ESKD from unknown etiology and is status post DDKT on 4/23/17.    Ms. Willingham reports feeling good overall with some medical complaints.  She has ESKD secondary to unknown etiology, s/p DDKT 4/23/17 with post transplant course c/b DGF.  Patient creatinine has now slowly improved, but still remains elevated near 3 and presents or evaluation and possible kidney transplant biopsy.    Her energy level is good and has improved considerably since  transplant.  She is active and has been doing some walking.  Denies any chest pain or shortness of breath with exertion.  Appetite is better and she has been eating 3 smaller portion meals a day.  She is trying to drink fluids, but is still below her dialysis dry weight.  Slight nausea in am prior to meds that resolved with Zofran, but no vomiting or diarrhea.  No fever, sweats or chills.  No leg swelling.    Pain Over Kidney Tx:  No Pain or Burning with Urination:  Yes, a little after ureteral stent was removed yesterday, but none now  Gross Hematuria:  No  Taking NSAIDs:  No    Home BP: 120-130s systolic    Review of Systems:  A comprehensive review of systems was obtained and negative, except as noted in the History of Present Illness or Active Medical Problems.    Active Medical Problems:  Patient Active Problem List    Diagnosis     Dehydration     Vitamin D deficiency     Metabolic acidosis     Hypomagnesemia     Immunosuppressed status (H)     Kidney replaced by transplant     Anemia in chronic renal disease     Secondary renal hyperparathyroidism (H)     Hypertension secondary to other renal disorders     Current Medications:  Current Outpatient Prescriptions   Medication Sig Dispense Refill     PROGRAF 1 MG PO CAPSULE Take 1 capsule (1 mg) by mouth 2 times daily 180 capsule 11     amLODIPine (NORVASC) 5 MG tablet Take 1 tablet (5 mg) by mouth daily (Patient taking differently: Take 10 mg by mouth daily ) 30 tablet 3     nystatin (MYCOSTATIN) 312144 unit/mL SUSP suspension Swish and swallow 5 mLs (500,000 Units) in mouth 4 times daily 240 mL 0     mycophenolic acid (MYFORTIC - GENERIC EQUIVALENT) 180 MG EC tablet Take 3 tablets (540 mg) by mouth 2 times daily 240 tablet 11     sodium bicarbonate 650 MG tablet Take 1 tablet (650 mg) by mouth 2 times daily 60 tablet 0     cholecalciferol (VITAMIN  -D) 1000 UNITS capsule Take 1 capsule (1,000 Units) by mouth daily 30 capsule 11     magnesium oxide (MAG-OX) 400  "MG tablet Take 1 tablet (400 mg) by mouth 2 times daily 120 tablet 3     ondansetron (ZOFRAN) 4 MG tablet Take 1 tablet (4 mg) by mouth every 8 hours as needed for nausea 30 tablet 0     sulfamethoxazole-trimethoprim (BACTRIM/SEPTRA) 400-80 MG per tablet Take 1 tablet by mouth Every Mon, Wed, Fri Morning 12 tablet 11     valGANciclovir (VALCYTE) 450 MG tablet Take 1 tablet (450 mg) by mouth twice a week 8 tablet 2     acetaminophen (TYLENOL) 325 MG tablet Take 2 tablets (650 mg) by mouth every 4 hours as needed for mild pain or fever 100 tablet 0     tacrolimus (PROGRAF - GENERIC EQUIVALENT) 1 MG capsule Take 4 capsules (4 mg) by mouth 2 times daily 100 capsule 0     tacrolimus (PROGRAF - GENERIC EQUIVALENT) 0.5 MG capsule Take 1 capsule (0.5 mg) by mouth every 12 hours Use for dose adjustments. 60 capsule 11     senna-docusate (SENOKOT-S;PERICOLACE) 8.6-50 MG per tablet Take 1-2 tablets by mouth daily as needed for constipation 100 tablet 0     Vitals:  /80  Temp 97.8  F (36.6  C) (Temporal)  Resp 16  Ht 1.575 m (5' 2\")  Wt 48.5 kg (107 lb)  LMP 05/15/2017  SpO2 100%  BMI 19.57 kg/m2    Physical Exam:   GENERAL APPEARANCE: alert and no distress  HENT: mouth without ulcers or lesions  PULM: lungs clear to auscultation, equal air movement  CV: regular rhythm, normal rate     - no LE edema bilaterally  GI: soft, nontender, bowel sounds are normal  MS: no evidence of inflammation in joints, no muscle tenderness  TX KIDNEY: nontender    Labs:   All labs reviewed by me  Recent Results (from the past 8 hour(s))   Basic metabolic panel    Collection Time: 05/16/17  8:51 AM   Result Value Ref Range    Sodium 143 133 - 144 mmol/L    Potassium 4.6 3.4 - 5.3 mmol/L    Chloride 112 (H) 94 - 109 mmol/L    Carbon Dioxide 22 20 - 32 mmol/L    Anion Gap 8 3 - 14 mmol/L    Glucose 85 70 - 99 mg/dL    Urea Nitrogen 33 (H) 7 - 30 mg/dL    Creatinine 2.86 (H) 0.52 - 1.04 mg/dL    GFR Estimate 18 (L) >60 mL/min/1.7m2    GFR " Estimate If Black 22 (L) >60 mL/min/1.7m2    Calcium 9.7 8.5 - 10.1 mg/dL   Phosphorus    Collection Time: 05/16/17  8:51 AM   Result Value Ref Range    Phosphorus 2.4 (L) 2.5 - 4.5 mg/dL   Magnesium    Collection Time: 05/16/17  8:51 AM   Result Value Ref Range    Magnesium 1.7 1.6 - 2.3 mg/dL   CBC with platelets differential    Collection Time: 05/16/17  8:51 AM   Result Value Ref Range    WBC 3.5 (L) 4.0 - 11.0 10e9/L    RBC Count 2.76 (L) 3.8 - 5.2 10e12/L    Hemoglobin 8.2 (L) 11.7 - 15.7 g/dL    Hematocrit 25.4 (L) 35.0 - 47.0 %    MCV 92 78 - 100 fl    MCH 29.7 26.5 - 33.0 pg    MCHC 32.3 31.5 - 36.5 g/dL    RDW 15.9 (H) 10.0 - 15.0 %    Platelet Count 353 150 - 450 10e9/L    Diff Method Automated Method     % Neutrophils 78.1 %    % Lymphocytes 10.9 %    % Monocytes 6.9 %    % Eosinophils 2.6 %    % Basophils 0.9 %    % Immature Granulocytes 0.6 %    Nucleated RBCs 0 0 /100    Absolute Neutrophil 2.7 1.6 - 8.3 10e9/L    Absolute Lymphocytes 0.4 (L) 0.8 - 5.3 10e9/L    Absolute Monocytes 0.2 0.0 - 1.3 10e9/L    Absolute Eosinophils 0.1 0.0 - 0.7 10e9/L    Absolute Basophils 0.0 0.0 - 0.2 10e9/L    Abs Immature Granulocytes 0.0 0 - 0.4 10e9/L    Absolute Nucleated RBC 0.0    INR    Collection Time: 05/16/17  8:51 AM   Result Value Ref Range    INR 1.06 0.86 - 1.14   ABO/Rh type and screen    Collection Time: 05/16/17  8:52 AM   Result Value Ref Range    ABO Pending     RH(D) Pending     Antibody Screen Pending     Test Valid Only At       [facility]    Specimen Expires 05/19/2017    Routine UA with microscopic    Collection Time: 05/16/17  8:56 AM   Result Value Ref Range    Color Urine Yellow     Appearance Urine Clear     Glucose Urine Negative NEG mg/dL    Bilirubin Urine Negative NEG    Ketones Urine Negative NEG mg/dL    Specific Gravity Urine 1.010 1.003 - 1.035    Blood Urine Small (A) NEG    pH Urine 6.0 5.0 - 7.0 pH    Protein Albumin Urine 30 (A) NEG  mg/dL    Urobilinogen mg/dL 0.0 0.0 - 2.0 mg/dL    Nitrite Urine Negative NEG    Leukocyte Esterase Urine Negative NEG    Source Midstream Urine     WBC Urine 2 0 - 2 /HPF    RBC Urine 3 (H) 0 - 2 /HPF    Squamous Epithelial /HPF Urine 1 0 - 1 /HPF    Mucous Urine Present (A) NEG /LPF    Hyaline Casts 1 0 - 2 /LPF   Albumin Random Urine Quantitative    Collection Time: 05/16/17  8:56 AM   Result Value Ref Range    Creatinine Urine 73 mg/dL    Albumin Urine mg/L 164 mg/L    Albumin Urine mg/g Cr 224.66 (H) 0 - 25 mg/g Cr   Protein  random urine    Collection Time: 05/16/17  8:56 AM   Result Value Ref Range    Protein Random Urine 0.53 g/L    Protein Total Urine g/gr Creatinine 0.72 (H) 0 - 0.2 g/g Cr        Sid Blankenship MD

## 2017-05-16 NOTE — IP AVS SNAPSHOT
[facility]    [address] [place] [address]    Phone:  [phone]    Fax:  [phone]                                       After Visit Summary   5/16/2017    Rosibel Willingham    MRN: [id number]           After Visit Summary Signature Page     I have received my discharge instructions, and my questions have been answered. I have discussed any challenges I see with this plan with the nurse or doctor.    ..........................................................................................................................................  Patient/Patient Representative Signature      ..........................................................................................................................................  Patient Representative Print Name and Relationship to Patient    ..................................................               ................................................  Date                                            Time    ..........................................................................................................................................  Reviewed by Signature/Title    ...................................................              ..............................................  Date                                                            Time

## 2017-05-17 ENCOUNTER — TELEPHONE (OUTPATIENT)
Dept: TRANSPLANT | Facility: CLINIC | Age: 42
End: 2017-05-17

## 2017-05-17 DIAGNOSIS — Z94.0 KIDNEY REPLACED BY TRANSPLANT: Primary | ICD-10-CM

## 2017-05-17 LAB
BKV DNA # SPEC NAA+PROBE: NORMAL COPIES/ML
BKV DNA SPEC NAA+PROBE-LOG#: NORMAL LOG COPIES/ML
CHOLEST SERPL-MCNC: 160 MG/DL
CMV DNA SPEC NAA+PROBE-ACNC: NORMAL [IU]/ML
CMV DNA SPEC NAA+PROBE-LOG#: NORMAL {LOG_IU}/ML
HDL SERPL QN: 9.4 NM
HDLC SERPL-MCNC: 44 MG/DL
HLD.LARGE SERPL-SCNC: 23.1 UMOL/L
HLD.LARGE SERPL-SCNC: 5.3 UMOL/L
LDL MED SERPL QN: 20.9 NM
LDL SERPL QN: 20.9
LDL SERPL-SCNC: 1007 NMOL/L
LDL SMALL SERPL-SCNC: 321 NMOL/L
LDL SMALL SERPL-SCNC: 321 NMOL/L
LDLC SERPL CALC-MCNC: 95 MG/DL
LP-IR SCORE SERPL: 37
MYCOPHENOLATE SERPL LC/MS/MS-MCNC: 1.04 MG/L (ref 1–3.5)
MYCOPHENOLATE-G SERPL LC/MS/MS-MCNC: 130.3 MG/L (ref 30–95)
PRA DONOR SPECIFIC ABY: NORMAL
SPECIMEN SOURCE: NORMAL
SPECIMEN SOURCE: NORMAL
TME LAST DOSE: ABNORMAL H
TRIGL SERPL-MCNC: 104 MG/DL
VLDL LARGE SERPL-SCNC: 1.7
VLDL SERPL QN: 45.9 NM

## 2017-05-17 RX ORDER — MYCOPHENOLIC ACID 180 MG/1
540 TABLET, DELAYED RELEASE ORAL 2 TIMES DAILY
Qty: 180 TABLET | Refills: 11 | Status: SHIPPED | OUTPATIENT
Start: 2017-05-17 | End: 2017-05-22

## 2017-05-17 RX ORDER — ONDANSETRON 4 MG/1
4 TABLET, FILM COATED ORAL EVERY 8 HOURS PRN
Qty: 30 TABLET | Refills: 0 | Status: SHIPPED | OUTPATIENT
Start: 2017-05-17 | End: 2017-05-22

## 2017-05-17 NOTE — TELEPHONE ENCOUNTER
Spoke to patient regarding:  Confirm local lab  Confirm pharmacy   Confirm PCP   Confirm medical information sent to local nephrologist   Review medications - need for refills   Review lab letter schedule   Updated patient chart with info.

## 2017-05-17 NOTE — TELEPHONE ENCOUNTER
Rosibelchanda Willingham is 24 days post kidney transplant  After kidney transplant  -   Please see all EPIC notes post transplant -- prolonged ATN -   JJ stent removed on May 15,2017   Kidney biopsy  On 5/16/2017  - biopsy results pending       Rosibel and  have been staying locally -   Anxious to return home/children  [place]   Plan:  Confirm local lab  Confirm pharmacy   Confirm PCP   Confirm medical  information sent to local nephrologist   Review medications - need for refills   Review lab letter schedule

## 2017-05-17 NOTE — LETTER
OUTPATIENT LABORATORY TEST ORDER    Patient Name:Rosibel Willingham   Transplant Date: 4/23/2017  YOB: 1975  Issue Date & Time: May 2, 2017 10:38 AM     [facility] MR: [id number]  Expiration Date:  (1 year after date issued)        Diagnoses: Aftercare following organ transplant (ICD-10 Z48.288)   Kidney Transplant (ICD-10  Z94.0)   Long term use of medications (ICD-10  Z79.899)     ?Lab results to be available on the same day drawn.   ?Patient should release information to the [facility], [facility].  ?Please fax to the Transplant Center at [phone].    3x/week, First 4Weeks post-transplant    4/23/2017 - 5/23/2017    2x/week, Months 2-3 post-transplant    5/24/2017 - 7/24/2017       1x/week, Months 4-6 post-transplant    7/25/2017 - 10/25/2017  Every 2 weeks, Months 7-9 post-transplant    10/26/2017 - 1/26/2018  Every 3 weeks, Months 10-12 post-transplant   1/27/2018 - 4/23/2018       ?Hemogram and Platelet   ?Basic Metabolic Panel (Sodium, Potassium,Chloride, CO2, Creatinine, Urea Nitrogen, Glucose, Calcium)   ?Prograf/Tacrolimus drug level     Weekly through first 3 months post-transplant    4/23/2017 - 7/23/2017   ?Phosphorus, Magnesium   ?MPA level (mycophenolic acid) once per week for first 3 months.    ?Please add a diff to hemogram once per week for the first 3 months.    Monthly   ? BK PCR Quantitative (Polyoma virus - blood in purple top tube to Reference Lab)    At 6 & 12 months post-transplant         10/2017 & 4/2018   ? HBsurfaceAb, HBcoreAb, HCV at 6 months only -   ? Liver Function Tests(Bilirubin Direct/Total, AST, ALT, Alkaline Phosphatase)   ?Complete Lipid Panel fasting (Cholesterol, Triglycerides, HDL, LDL)   ? Random Urine for Protein/Creatinineratio    At month(s) 1, 2, 4, 6, 9, 12      5/2017, 6/2017, 8/2017, 10/2017, 1/2018, 4/2018                  ? PRA/DSA level (mailers provided by the patient)                     If you have any  questions, please call The Transplant Center at [phone] or [phone].    Please faxall results to [phone].  .

## 2017-05-18 ENCOUNTER — OFFICE VISIT (OUTPATIENT)
Dept: TRANSPLANT | Facility: CLINIC | Age: 42
End: 2017-05-18
Attending: NURSE PRACTITIONER
Payer: COMMERCIAL

## 2017-05-18 ENCOUNTER — RECORDS - HEALTHEAST (OUTPATIENT)
Dept: ADMINISTRATIVE | Facility: OTHER | Age: 42
End: 2017-05-18

## 2017-05-18 VITALS
DIASTOLIC BLOOD PRESSURE: 75 MMHG | TEMPERATURE: 98.1 F | OXYGEN SATURATION: 100 % | SYSTOLIC BLOOD PRESSURE: 122 MMHG | RESPIRATION RATE: 16 BRPM | HEART RATE: 55 BPM

## 2017-05-18 DIAGNOSIS — Z94.0 KIDNEY REPLACED BY TRANSPLANT: ICD-10-CM

## 2017-05-18 DIAGNOSIS — Z94.0 KIDNEY REPLACED BY TRANSPLANT: Primary | ICD-10-CM

## 2017-05-18 DIAGNOSIS — Z48.298 AFTERCARE FOLLOWING ORGAN TRANSPLANT: ICD-10-CM

## 2017-05-18 LAB
ANION GAP SERPL CALCULATED.3IONS-SCNC: 10 MMOL/L (ref 3–14)
BASOPHILS # BLD AUTO: 0 10E9/L (ref 0–0.2)
BASOPHILS NFR BLD AUTO: 0.8 %
BUN SERPL-MCNC: 39 MG/DL (ref 7–30)
CALCIUM SERPL-MCNC: 9.8 MG/DL (ref 8.5–10.1)
CHLORIDE SERPL-SCNC: 112 MMOL/L (ref 94–109)
CO2 SERPL-SCNC: 21 MMOL/L (ref 20–32)
CREAT SERPL-MCNC: 3.02 MG/DL (ref 0.52–1.04)
DIFFERENTIAL METHOD BLD: ABNORMAL
EOSINOPHIL # BLD AUTO: 0.1 10E9/L (ref 0–0.7)
EOSINOPHIL NFR BLD AUTO: 2.9 %
ERYTHROCYTE [DISTWIDTH] IN BLOOD BY AUTOMATED COUNT: 16.3 % (ref 10–15)
GFR SERPL CREATININE-BSD FRML MDRD: 17 ML/MIN/1.7M2
GLUCOSE SERPL-MCNC: 90 MG/DL (ref 70–99)
HCT VFR BLD AUTO: 26.4 % (ref 35–47)
HGB BLD-MCNC: 8.4 G/DL (ref 11.7–15.7)
IMM GRANULOCYTES # BLD: 0 10E9/L (ref 0–0.4)
IMM GRANULOCYTES NFR BLD: 0.3 %
LYMPHOCYTES # BLD AUTO: 0.5 10E9/L (ref 0.8–5.3)
LYMPHOCYTES NFR BLD AUTO: 12.1 %
MAGNESIUM SERPL-MCNC: 1.8 MG/DL (ref 1.6–2.3)
MCH RBC QN AUTO: 29.3 PG (ref 26.5–33)
MCHC RBC AUTO-ENTMCNC: 31.8 G/DL (ref 31.5–36.5)
MCV RBC AUTO: 92 FL (ref 78–100)
MONOCYTES # BLD AUTO: 0.3 10E9/L (ref 0–1.3)
MONOCYTES NFR BLD AUTO: 7.1 %
NEUTROPHILS # BLD AUTO: 2.9 10E9/L (ref 1.6–8.3)
NEUTROPHILS NFR BLD AUTO: 76.8 %
NRBC # BLD AUTO: 0 10*3/UL
NRBC BLD AUTO-RTO: 0 /100
PHOSPHATE SERPL-MCNC: 3 MG/DL (ref 2.5–4.5)
PLATELET # BLD AUTO: 400 10E9/L (ref 150–450)
POTASSIUM SERPL-SCNC: 5.1 MMOL/L (ref 3.4–5.3)
RBC # BLD AUTO: 2.87 10E12/L (ref 3.8–5.2)
SODIUM SERPL-SCNC: 143 MMOL/L (ref 133–144)
TACROLIMUS BLD-MCNC: 9.4 UG/L (ref 5–15)
TME LAST DOSE: 2000 H
WBC # BLD AUTO: 3.8 10E9/L (ref 4–11)

## 2017-05-18 PROCEDURE — 99212 OFFICE O/P EST SF 10 MIN: CPT

## 2017-05-18 PROCEDURE — 36415 COLL VENOUS BLD VENIPUNCTURE: CPT | Performed by: INTERNAL MEDICINE

## 2017-05-18 PROCEDURE — 80180 DRUG SCRN QUAN MYCOPHENOLATE: CPT | Performed by: INTERNAL MEDICINE

## 2017-05-18 PROCEDURE — 85004 AUTOMATED DIFF WBC COUNT: CPT | Performed by: INTERNAL MEDICINE

## 2017-05-18 PROCEDURE — 99211 OFF/OP EST MAY X REQ PHY/QHP: CPT | Mod: 25

## 2017-05-18 PROCEDURE — 80197 ASSAY OF TACROLIMUS: CPT | Performed by: INTERNAL MEDICINE

## 2017-05-18 PROCEDURE — 85027 COMPLETE CBC AUTOMATED: CPT | Performed by: INTERNAL MEDICINE

## 2017-05-18 PROCEDURE — 83735 ASSAY OF MAGNESIUM: CPT | Performed by: INTERNAL MEDICINE

## 2017-05-18 PROCEDURE — 80048 BASIC METABOLIC PNL TOTAL CA: CPT | Performed by: INTERNAL MEDICINE

## 2017-05-18 PROCEDURE — 84100 ASSAY OF PHOSPHORUS: CPT | Performed by: INTERNAL MEDICINE

## 2017-05-18 NOTE — LETTER
2017       RE: Rosibel Willingham  [address] [facility] [address]     Dear Colleague,    Thank you for referring your patient, Rosibel Willingham, to the [facility] SOLID ORGAN TRANSPLANT at [facility]. Please see a copy of my visit note below.    Transplant Surgery Progress Note    Medical record number: [id number]  YOB: 1975,   Date of Visit:  2017    S: Rosibel Willingham is a 41 year old female with ESRD of unknown origin on dialysis since 2016. Dry weight 52kg. Patient continued to make her normal UO while on HD. S/p DDKT with ureteral stent placement on 2017. CIT 25hrs. HD on POD 0 due to hyperkalemia, HD again on POD 2.   Had ureteral stent removed on 5/15/17 and kidney biopsy on 17.  She presents today for removal of ALFA drain.  She states it has put out less than 30ml for 3 days.    Patient denies any fever, chills, nausea or vomiting.  She is having no difficulty taking her medications.     Transplant History:  Transplant: 2017 (Kidney)   Donor Type:  - Brain Death        Present Maintenance Immunosuppression:  Tacrolimus and Mycophenolate mofetil  UNOS cPRA   Date Value Ref Range Status   2016 99  Final       Transplant Coordinator: Chinyere Burnham     Transplant Office Phone Number: [phone]   O: Vitals: /75  Pulse 55  Temp 98.1  F (36.7  C) (Oral)  Resp 16  LMP 05/15/2017  SpO2 100%  BMI= There is no height or weight on file to calculate BMI.  Focussed exam:    Abdomen: Flat, bowel sounds active in all 4 quadrants, no tenderness over kidney graft, no ascites noted.  ALFA drain removed without difficulty     A/P:   1. S/P DDKT on 17.  No change to immunosuppressive medications. Await today's labs.  Alfa drain removed without difficulty.  Monitor area.  S/S of infection reviewed with patient.      Total time: 15 minutes  Counselling time: 10 minutes    Breonna Diaz, CNP

## 2017-05-18 NOTE — MR AVS SNAPSHOT
After Visit Summary   5/18/2017    Rosibel Willingham    MRN: [id number]           Patient Information     Date Of Birth          1975        Visit Information        Provider Department      5/18/2017 8:30 AM Breonna Diaz NP [facility]        Today's Diagnoses     Kidney replaced by transplant    -  1       Follow-ups after your visit        Your next 10 appointments already scheduled     May 22, 2017  1:00 PM CDT   (Arrive by 12:30 PM)   Return Kidney Transplant with Benjamin Beltran MD   [facility] ([facility])    [address] [place] [id number] [phone]            Jul 24, 2017  1:10 PM CDT   (Arrive by 12:40 PM)   Return Kidney Transplant with Benjamin Beltran MD   [facility] ([facility])    [address] [place] [id number] [phone]              Who to contact     If you have questions or need follow up information about today's clinic visit or your schedule please contact [facility] SOLID ORGAN TRANSPLANT directly at [phone].  Normal or non-critical lab and imaging results will be communicated to you by [employer], letter or phone within 4 business days after the clinic has received the results. If you do not hear from us within 7 days, please contact the clinic through [employer] or phone. If you have a critical or abnormal lab result, we will notify you by phone as soon as possible.  Submit refill requests through [employer] or call your pharmacy and they will forward the refill request to us. Please allow 3 business days for your refill to be completed.          Additional Information About Your Visit        MyChart Information     [employer] gives you secure access to your electronic health record. If you see a primary care provider, you can also send messages to your care team and make appointments. If you have questions, please call your  primary care clinic.  If you do not have a primary care provider, please call [phone] and they will assist you.        Care EveryWhere ID     This is your Care EveryWhere ID. This could be used by other organizations to access your [facility] medical records  [id number]        Your Vitals Were     Pulse Temperature Respirations Last Period Pulse Oximetry       55 98.1  F (36.7  C) (Oral) 16 05/15/2017 100%        Blood Pressure from Last 3 Encounters:   05/18/17 122/75   05/16/17 (P) 135/86   05/15/17 130/80    Weight from Last 3 Encounters:   05/16/17 48.5 kg (107 lb)   05/15/17 48.9 kg (107 lb 12.9 oz)   05/14/17 49.2 kg (108 lb 8 oz)              Today, you had the following     No orders found for display         Today's Medication Changes          These changes are accurate as of: 5/18/17  5:20 PM.  If you have any questions, ask your nurse or doctor.               These medicines have changed or have updated prescriptions.        Dose/Directions    amLODIPine 5 MG tablet   Commonly known as:  NORVASC   This may have changed:  how much to take   Used for:  End stage renal disease (H)        Dose:  5 mg   Take 1 tablet (5 mg) by mouth daily   Quantity:  30 tablet   Refills:  3                Primary Care Provider    Physician No Ref-Primary       No address on file        Thank you!     Thank you for choosing [facility] SOLID ORGAN TRANSPLANT  for your care. Our goal is always to provide you with excellent care. Hearing back from our patients is one way we can continue to improve our services. Please take a few minutes to complete the written survey that you may receive in the mail after your visit with us. Thank you!             Your Updated Medication List - Protect others around you: Learn how to safely use, store and throw away your medicines at www.disposemymeds.org.          This list is accurate as of: 5/18/17  5:20 PM.  Always use your most recent med list.                   Brand Name Dispense  Instructions for use    acetaminophen 325 MG tablet    TYLENOL    100 tablet    Take 2 tablets (650 mg) by mouth every 4 hours as needed for mild pain or fever       amLODIPine 5 MG tablet    NORVASC    30 tablet    Take 1 tablet (5 mg) by mouth daily       cholecalciferol 1000 UNITS capsule    vitamin  -D    30 capsule    Take 1 capsule (1,000 Units) by mouth daily       magnesium oxide 400 MG tablet    MAG-OX    120 tablet    Take 1 tablet (400 mg) by mouth 2 times daily       mycophenolic acid 180 MG EC tablet    MYFORTIC - GENERIC EQUIVALENT    180 tablet    Take 3 tablets (540 mg) by mouth 2 times daily       nystatin 839025 unit/mL Susp suspension    MYCOSTATIN    240 mL    Swish and swallow 5 mLs (500,000 Units) in mouth 4 times daily       ondansetron 4 MG tablet    ZOFRAN    30 tablet    Take 1 tablet (4 mg) by mouth every 8 hours as needed for nausea       senna-docusate 8.6-50 MG per tablet    SENOKOT-S;PERICOLACE    100 tablet    Take 1-2 tablets by mouth daily as needed for constipation       sodium bicarbonate 650 MG tablet     60 tablet    Take 1 tablet (650 mg) by mouth 2 times daily       sulfamethoxazole-trimethoprim 400-80 MG per tablet    BACTRIM/SEPTRA    12 tablet    Take 1 tablet by mouth Every Mon, Wed, Fri Morning       * tacrolimus capsule     180 capsule    Take 1 capsule (1 mg) by mouth 2 times daily       * tacrolimus 0.5 MG capsule    PROGRAF - GENERIC EQUIVALENT    60 capsule    Take 1 capsule (0.5 mg) by mouth every 12 hours Use for dose adjustments.       * tacrolimus 1 MG capsule    PROGRAF - GENERIC EQUIVALENT    100 capsule    Take 4 capsules (4 mg) by mouth 2 times daily       valGANciclovir 450 MG tablet    VALCYTE    8 tablet    Take 1 tablet (450 mg) by mouth twice a week       * Notice:  This list has 3 medication(s) that are the same as other medications prescribed for you. Read the directions carefully, and ask your doctor or other care provider to review them with you.

## 2017-05-18 NOTE — PROGRESS NOTES
Transplant Surgery Progress Note    Medical record number: [id number]  YOB: 1975,   Date of Visit:  2017    S: Rosibel Willingham is a 41 year old female with ESRD of unknown origin on dialysis since 2016. Dry weight 52kg. Patient continued to make her normal UO while on HD. S/p DDKT with ureteral stent placement on 2017. CIT 25hrs. HD on POD 0 due to hyperkalemia, HD again on POD 2.   Had ureteral stent removed on 5/15/17 and kidney biopsy on 17.  She presents today for removal of ALFA drain.  She states it has put out less than 30ml for 3 days.    Patient denies any fever, chills, nausea or vomiting.  She is having no difficulty taking her medications.     Transplant History:  Transplant: 2017 (Kidney)   Donor Type:  - Brain Death        Present Maintenance Immunosuppression:  Tacrolimus and Mycophenolate mofetil  UNOS cPRA   Date Value Ref Range Status   2016 99  Final       Transplant Coordinator: Chinyere Burnham     Transplant Office Phone Number: [phone]   O: Vitals: /75  Pulse 55  Temp 98.1  F (36.7  C) (Oral)  Resp 16  LMP 05/15/2017  SpO2 100%  BMI= There is no height or weight on file to calculate BMI.  Focussed exam:    Abdomen: Flat, bowel sounds active in all 4 quadrants, no tenderness over kidney graft, no ascites noted.  ALFA drain removed without difficulty     A/P:   1. S/P DDKT on 17.  No change to immunosuppressive medications. Await today's labs.  Alfa drain removed without difficulty.  Monitor area.  S/S of infection reviewed with patient.      Total time: 15 minutes  Counselling time: 10 minutes    Breonna Diaz, CNP

## 2017-05-19 DIAGNOSIS — Z94.0 KIDNEY REPLACED BY TRANSPLANT: Primary | ICD-10-CM

## 2017-05-19 LAB
MYCOPHENOLATE SERPL LC/MS/MS-MCNC: 1.82 MG/L (ref 1–3.5)
MYCOPHENOLATE-G SERPL LC/MS/MS-MCNC: 135.9 MG/L (ref 30–95)
TME LAST DOSE: 2000 H

## 2017-05-19 NOTE — TELEPHONE ENCOUNTER
Pharmacy calling with refill request for zofran 4mg tablets     Can be reached at [phone]  [facility]

## 2017-05-19 NOTE — TELEPHONE ENCOUNTER
Follow up phone call  Post discharge from the outpatient setting to her local home   Rosibel Willingham should have arrived home yesterday   Please call confirm that her local lab has orders --   Confirm has medications for the weekend

## 2017-05-19 NOTE — TELEPHONE ENCOUNTER
Spoke to patient and she made it home OK.  No concerns/questions.  Patient is scheduled to have labs completed M,W,F next week.  Patient cancelled appt. With Dr. Beltran for Monday 5/22/2017, will be back in [place] on 6/30/2017.  Messaged schedulers to see if she can see Dr. Beltran that day.

## 2017-05-22 ENCOUNTER — TELEPHONE (OUTPATIENT)
Dept: TRANSPLANT | Facility: CLINIC | Age: 42
End: 2017-05-22

## 2017-05-22 DIAGNOSIS — N18.6 END STAGE RENAL DISEASE (H): ICD-10-CM

## 2017-05-22 DIAGNOSIS — E87.20 ACIDOSIS: ICD-10-CM

## 2017-05-22 DIAGNOSIS — E55.9 VITAMIN D DEFICIENCY: ICD-10-CM

## 2017-05-22 DIAGNOSIS — Z94.0 KIDNEY REPLACED BY TRANSPLANT: Primary | ICD-10-CM

## 2017-05-22 DIAGNOSIS — D84.9 IMMUNOSUPPRESSION (H): ICD-10-CM

## 2017-05-22 DIAGNOSIS — E83.42 HYPOMAGNESEMIA: ICD-10-CM

## 2017-05-22 RX ORDER — SULFAMETHOXAZOLE AND TRIMETHOPRIM 400; 80 MG/1; MG/1
1 TABLET ORAL
Qty: 12 TABLET | Refills: 11 | Status: SHIPPED | OUTPATIENT
Start: 2017-05-22 | End: 2017-07-03

## 2017-05-22 RX ORDER — SODIUM BICARBONATE 650 MG/1
650 TABLET ORAL 2 TIMES DAILY
Qty: 60 TABLET | Refills: 1 | Status: SHIPPED | OUTPATIENT
Start: 2017-05-22 | End: 2017-07-03

## 2017-05-22 RX ORDER — ONDANSETRON 4 MG/1
4 TABLET, FILM COATED ORAL EVERY 8 HOURS PRN
Qty: 30 TABLET | Refills: 0 | Status: SHIPPED | OUTPATIENT
Start: 2017-05-22 | End: 2017-06-15

## 2017-05-22 RX ORDER — MYCOPHENOLIC ACID 180 MG/1
540 TABLET, DELAYED RELEASE ORAL 2 TIMES DAILY
Qty: 180 TABLET | Refills: 11 | Status: SHIPPED | OUTPATIENT
Start: 2017-05-22 | End: 2018-06-04

## 2017-05-22 RX ORDER — AMLODIPINE BESYLATE 5 MG/1
10 TABLET ORAL DAILY
Qty: 60 TABLET | Refills: 3 | Status: SHIPPED | OUTPATIENT
Start: 2017-05-22 | End: 2018-10-24

## 2017-05-22 RX ORDER — VALGANCICLOVIR 450 MG/1
450 TABLET, FILM COATED ORAL
Qty: 8 TABLET | Refills: 1 | Status: SHIPPED | OUTPATIENT
Start: 2017-05-22 | End: 2017-07-03

## 2017-05-22 RX ORDER — TACROLIMUS 1 MG/1
4 CAPSULE ORAL 2 TIMES DAILY
Qty: 240 CAPSULE | Refills: 11 | Status: SHIPPED | OUTPATIENT
Start: 2017-05-22 | End: 2017-09-06

## 2017-05-22 RX ORDER — TACROLIMUS 0.5 MG/1
0.5 CAPSULE ORAL EVERY 12 HOURS
Qty: 60 CAPSULE | Refills: 11 | Status: SHIPPED | OUTPATIENT
Start: 2017-05-22 | End: 2017-09-06

## 2017-05-22 RX ORDER — MAGNESIUM OXIDE 400 MG/1
400 TABLET ORAL 2 TIMES DAILY
Qty: 60 TABLET | Refills: 3 | Status: SHIPPED | OUTPATIENT
Start: 2017-05-22 | End: 2017-07-03

## 2017-05-25 ENCOUNTER — TELEPHONE (OUTPATIENT)
Dept: TRANSPLANT | Facility: CLINIC | Age: 42
End: 2017-05-25

## 2017-05-25 LAB
COPATH REPORT: NORMAL
DONOR IDENTIFICATION: NORMAL
DSA COMMENTS: NORMAL
DSA PRESENT: NO
DSA TEST METHOD: NORMAL
INTERFERING SUBST TEST METHOD: NORMAL
INTERFERING SUBSTANCE COMMENT: NORMAL
INTERFERING SUBSTANCE RESULT: NORMAL
INTERFERING SUBSTANCE: NORMAL
ORGAN: NORMAL
SA1 CELL: NORMAL
SA1 COMMENTS: NORMAL
SA1 HI RISK ABY: NORMAL
SA1 MOD RISK ABY: NORMAL
SA1 TEST METHOD: NORMAL
SA2 CELL: NORMAL
SA2 COMMENTS: NORMAL
SA2 HI RISK ABY UA: NORMAL
SA2 MOD RISK ABY: NORMAL
SA2 TEST METHOD: NORMAL

## 2017-05-25 NOTE — TELEPHONE ENCOUNTER
Spoke to Rosibel Willingham regarding most recent labs Cr 3.2     Rosibel reports /84   Wt  105 lbs   No fevers  Drinking 2 L of fluid but trying to increaes to 3 L   Doing well @ home [facility]      Verbalizes good knowledge regarding anti rejection medications   Good knowledge for follow up

## 2017-06-15 DIAGNOSIS — Z94.0 KIDNEY REPLACED BY TRANSPLANT: Primary | ICD-10-CM

## 2017-06-15 RX ORDER — ONDANSETRON 4 MG/1
4 TABLET, FILM COATED ORAL EVERY 8 HOURS PRN
Qty: 30 TABLET | Refills: 0 | Status: SHIPPED | OUTPATIENT
Start: 2017-06-15 | End: 2019-02-17

## 2017-06-16 ENCOUNTER — TRANSFERRED RECORDS (OUTPATIENT)
Dept: HEALTH INFORMATION MANAGEMENT | Facility: CLINIC | Age: 42
End: 2017-06-16

## 2017-06-16 ENCOUNTER — TELEPHONE (OUTPATIENT)
Dept: TRANSPLANT | Facility: CLINIC | Age: 42
End: 2017-06-16

## 2017-06-16 NOTE — TELEPHONE ENCOUNTER
Issue: Tac elevated at 15.    Plan: No dose change at this time, Rosibel states she had tac level drawn yesterday (6/15), will wait for those results.  Rosibel reports she is feeling well.

## 2017-07-01 ASSESSMENT — ENCOUNTER SYMPTOMS
STIFFNESS: 0
BLOATING: 0
MEMORY LOSS: 0
WEAKNESS: 0
JAUNDICE: 0
DIZZINESS: 0
MYALGIAS: 0
HEADACHES: 0
BLOOD IN STOOL: 1
DISTURBANCES IN COORDINATION: 0
BACK PAIN: 1
HEARTBURN: 0
RECTAL PAIN: 0
CONSTIPATION: 0
SEIZURES: 0
NUMBNESS: 0
MUSCLE CRAMPS: 1
ABDOMINAL PAIN: 0
NAUSEA: 1
BOWEL INCONTINENCE: 0
TINGLING: 0
SPEECH CHANGE: 0
MUSCLE WEAKNESS: 0
RECTAL BLEEDING: 0
NECK PAIN: 0
VOMITING: 0
TREMORS: 1
PARALYSIS: 0
JOINT SWELLING: 0
DIARRHEA: 1
ARTHRALGIAS: 0
LOSS OF CONSCIOUSNESS: 0

## 2017-07-03 ENCOUNTER — RECORDS - HEALTHEAST (OUTPATIENT)
Dept: ADMINISTRATIVE | Facility: OTHER | Age: 42
End: 2017-07-03

## 2017-07-03 ENCOUNTER — OFFICE VISIT (OUTPATIENT)
Dept: NEPHROLOGY | Facility: CLINIC | Age: 42
End: 2017-07-03
Attending: INTERNAL MEDICINE
Payer: COMMERCIAL

## 2017-07-03 VITALS
HEIGHT: 62 IN | DIASTOLIC BLOOD PRESSURE: 78 MMHG | HEART RATE: 84 BPM | SYSTOLIC BLOOD PRESSURE: 129 MMHG | BODY MASS INDEX: 19.51 KG/M2 | WEIGHT: 106 LBS | TEMPERATURE: 98.4 F

## 2017-07-03 DIAGNOSIS — Z48.298 AFTERCARE FOLLOWING ORGAN TRANSPLANT: Primary | ICD-10-CM

## 2017-07-03 PROCEDURE — 99213 OFFICE O/P EST LOW 20 MIN: CPT | Mod: ZF

## 2017-07-03 RX ORDER — VALGANCICLOVIR 450 MG/1
450 TABLET, FILM COATED ORAL
Qty: 15 TABLET | Refills: 1 | Status: SHIPPED | OUTPATIENT
Start: 2017-07-03 | End: 2017-08-18

## 2017-07-03 RX ORDER — SULFAMETHOXAZOLE AND TRIMETHOPRIM 400; 80 MG/1; MG/1
1 TABLET ORAL
Qty: 15 TABLET | Refills: 1 | Status: SHIPPED | OUTPATIENT
Start: 2017-07-03 | End: 2017-08-18

## 2017-07-03 ASSESSMENT — PAIN SCALES - GENERAL: PAINLEVEL: NO PAIN (0)

## 2017-07-03 NOTE — NURSING NOTE
"Chief Complaint   Patient presents with     RECHECK     follow up with kidney transplant, [facility]       Initial /78  Pulse 84  Temp 98.4  F (36.9  C) (Oral)  Ht 1.575 m (5' 2\")  Wt 48.1 kg (106 lb)  BMI 19.39 kg/m2 Estimated body mass index is 19.39 kg/(m^2) as calculated from the following:    Height as of this encounter: 1.575 m (5' 2\").    Weight as of this encounter: 48.1 kg (106 lb).  Medication Reconciliation: complete    "

## 2017-07-03 NOTE — MR AVS SNAPSHOT
"              After Visit Summary   7/3/2017    Rosibel Willingham    MRN: [id number]           Patient Information     Date Of Birth          1975        Visit Information        Provider Department      7/3/2017 12:30 PM Benjamin Beltran MD [facility] Nephrology        Today's Diagnoses     Aftercare following organ transplant    -  1       Follow-ups after your visit        Who to contact     If you have questions or need follow up information about today's clinic visit or your schedule please contact [facility] NEPHROLOGY directly at [phone].  Normal or non-critical lab and imaging results will be communicated to you by Gigmaxhart, letter or phone within 4 business days after the clinic has received the results. If you do not hear from us within 7 days, please contact the clinic through 9flats or phone. If you have a critical or abnormal lab result, we will notify you by phone as soon as possible.  Submit refill requests through 9flats or call your pharmacy and they will forward the refill request to us. Please allow 3 business days for your refill to be completed.          Additional Information About Your Visit        MyChart Information     9flats gives you secure access to your electronic health record. If you see a primary care provider, you can also send messages to your care team and make appointments. If you have questions, please call your primary care clinic.  If you do not have a primary care provider, please call [phone] and they will assist you.        Care EveryWhere ID     This is your Care EveryWhere ID. This could be used by other organizations to access your [place] medical records  [id number]        Your Vitals Were     Pulse Temperature Height BMI (Body Mass Index)          84 98.4  F (36.9  C) (Oral) 1.575 m (5' 2\") 19.39 kg/m2         Blood Pressure from Last 3 Encounters:   07/03/17 129/78   05/18/17 122/75   05/16/17 (P) 135/86    Weight from Last 3 Encounters:   07/03/17 48.1 " kg (106 lb)   05/16/17 48.5 kg (107 lb)   05/15/17 48.9 kg (107 lb 12.9 oz)              Today, you had the following     No orders found for display         Today's Medication Changes          These changes are accurate as of: 7/3/17 11:59 PM.  If you have any questions, ask your nurse or doctor.               These medicines have changed or have updated prescriptions.        Dose/Directions    valGANciclovir 450 MG tablet   Commonly known as:  VALCYTE   This may have changed:  when to take this   Used for:  Aftercare following organ transplant   Changed by:  Benjamin Beltran MD        Dose:  450 mg   Take 1 tablet (450 mg) by mouth Every Mon, Wed, Fri Morning   Quantity:  15 tablet   Refills:  1         Stop taking these medicines if you haven't already. Please contact your care team if you have questions.     magnesium oxide 400 MG tablet   Commonly known as:  MAG-OX   Stopped by:  Benjamin Beltran MD           nystatin 484202 unit/mL Susp suspension   Commonly known as:  MYCOSTATIN   Stopped by:  Benjamin Beltran MD           senna-docusate 8.6-50 MG per tablet   Commonly known as:  SENOKOT-S;PERICOLACE   Stopped by:  Benjamin Beltran MD           sodium bicarbonate 650 MG tablet   Stopped by:  Benjamin Beltran MD                Where to get your medicines      These medications were sent to [facility]/pharmacy #[id number] [place] [address]  [address] [facility] [address]    Hours:  24-hours Phone:  [phone]     sulfamethoxazole-trimethoprim 400-80 MG per tablet    valGANciclovir 450 MG tablet                Primary Care Provider    Physician No Ref-Primary       No address on file        Equal Access to Services     [facility]: Tuan Guerrero, willi teran, qavasyl saavedra . So [facility] [phone].    ATENCIÓN: Si habla español, tiene a perea disposición servicios gratuitos de asistencia  lingüísticaJayce Alston al [phone].    We comply with applicable federal civil rights laws and [place] laws. We do not discriminate on the basis of race, color, national origin, age, disability sex, sexual orientation or gender identity.            Thank you!     Thank you for choosing [facility] NEPHROLOGY  for your care. Our goal is always to provide you with excellent care. Hearing back from our patients is one way we can continue to improve our services. Please take a few minutes to complete the written survey that you may receive in the mail after your visit with us. Thank you!             Your Updated Medication List - Protect others around you: Learn how to safely use, store and throw away your medicines at www.disposemymeds.org.          This list is accurate as of: 7/3/17 11:59 PM.  Always use your most recent med list.                   Brand Name Dispense Instructions for use Diagnosis    acetaminophen 325 MG tablet    TYLENOL    100 tablet    Take 2 tablets (650 mg) by mouth every 4 hours as needed for mild pain or fever    Kidney replaced by transplant       amLODIPine 5 MG tablet    NORVASC    60 tablet    Take 2 tablets (10 mg) by mouth daily    End stage renal disease (H), Vitamin D deficiency, Hypomagnesemia, Kidney replaced by transplant, Acidosis, Immunosuppression (H)       ASPIRIN PO      Take 325 mg by mouth daily        cholecalciferol 1000 UNITS capsule    vitamin  -D    30 capsule    Take 1 capsule (1,000 Units) by mouth daily    Vitamin D deficiency, End stage renal disease (H), Hypomagnesemia, Kidney replaced by transplant, Acidosis, Immunosuppression (H)       mycophenolic acid 180 MG EC tablet    MYFORTIC - GENERIC EQUIVALENT    180 tablet    Take 3 tablets (540 mg) by mouth 2 times daily    Kidney replaced by transplant, End stage renal disease (H), Vitamin D deficiency, Hypomagnesemia, Acidosis, Immunosuppression (H)       ondansetron 4 MG tablet    ZOFRAN    30 tablet    Take 1 tablet  (4 mg) by mouth every 8 hours as needed for nausea    Kidney replaced by transplant       sulfamethoxazole-trimethoprim 400-80 MG per tablet    BACTRIM    15 tablet    Take 1 tablet by mouth Every Mon, Wed, Fri Morning    Aftercare following organ transplant       * tacrolimus 0.5 MG capsule    PROGRAF - GENERIC EQUIVALENT    60 capsule    Take 1 capsule (0.5 mg) by mouth every 12 hours Use for dose adjustments.    Kidney replaced by transplant, End stage renal disease (H), Vitamin D deficiency, Hypomagnesemia, Acidosis, Immunosuppression (H)       * tacrolimus 1 MG capsule    PROGRAF - GENERIC EQUIVALENT    240 capsule    Take 4 capsules (4 mg) by mouth 2 times daily    Kidney replaced by transplant, Immunosuppression (H), End stage renal disease (H), Vitamin D deficiency, Hypomagnesemia, Acidosis       valGANciclovir 450 MG tablet    VALCYTE    15 tablet    Take 1 tablet (450 mg) by mouth Every Mon, Wed, Fri Morning    Aftercare following organ transplant       * Notice:  This list has 2 medication(s) that are the same as other medications prescribed for you. Read the directions carefully, and ask your doctor or other care provider to review them with you.

## 2017-07-03 NOTE — LETTER
7/3/2017       RE: Rosibel Willingham  [address] [facility] [address]     Dear Colleague,    Thank you for referring your patient, Rosibel Willingham, to the [facility] NEPHROLOGY at [facility]. Please see a copy of my visit note below.    Assessment and Plan:  1. DDKT - Patient had DGF, improving now, Cr 2.7 from 3.2, will hold off on biopsy for now but continue to trend Cr. If tac level therapeutic in 8-10 range and Cr improving despite this, then we are comfortable. If Cr rises or does not fall with therapeutic tac level, then will require biopsy. Will trend. No DSA.  -Drain 30-40cc/day x3 days.   -Will discuss logistics with surgeons and providers in [place] about timing of stent removal, drain removal, going back to [place], and whether she can be followed or have any follow-up care by transplant team in [place] moving forward.  2. Hypertension - Blood pressure optimal, no change  3. Hypovolemic - improved, will not give IVF's today, continue to push PO fluids  4. Anemia - Hgb 8.1 from 7.6, iron replete, S/P Aranesp 25mcg on 5/9  5. Renal Secondary Hyperparathyroidism - mildly elevated PTH at 149.  Patient has mild vitamin d insufficiency.  Recheck PTH at 3 months.  6. Vitamin D insufficiency - at 29.  On cholecalciferol 1000 units daily  7. Prophylaxis - EBV D+R+, CMV D+R+.  Valcyte for 3 months, Bactrim indefinitely. Will need dose adjustment with further improvement in Cr  8. Nausea - improved, zofran prn  9. Hypomagnesemia - continue magnesium oxide 400 mg bid  10. Thrush - Continue nystatin s/s qid    Follow up on Thursday with labs.    Assessment and plan was discussed with patient and she voiced her understanding and agreement.    Staffed with Dr. Beltran.    Master Paul  Nephrology Fellow  Pager: [phone]    Reason for Visit:  Ms. Willingham is here for hospital follow up following kidney transplant.    HPI:   Rosibel Willingham is a 41 year old  female with ESKD from unknown etiology and is status post DDKT on 4/23/17.         Transplant Hx:       Tx: DDKT  Date: 4/23/17       Present Maintenance IS: Tacrolimus and Mycophenolate mofetil       Baseline Creatinine: TBD       Recent DSA: No  PRA ():     Class I:      Class II:        Biopsy: No    She feels well today, nausea improving, appetite better, tolerating 2.2-2.5L fluid intake daily, pain controlled, stooling, no pain or trouble with urination. Drain is 30-40 cc/day for last 3-4 days. She is wondering about logistics of going home and who she can follow with in [place] and when.    Home BP: 130's/80's      ROS:   A comprehensive review of systems was obtained and negative, except as noted in the HPI or PMH.    Active Medical Problems:  Patient Active Problem List   Diagnosis     Anemia in chronic renal disease     Secondary renal hyperparathyroidism (H)     Hypertension secondary to other renal disorders     Kidney replaced by transplant     Immunosuppressed status (H)     Vitamin D deficiency     Metabolic acidosis     Hypomagnesemia     Dehydration     Personal Hx:  Social History     Social History     Marital status:      Spouse name: N/A     Number of children: N/A     Years of education: N/A     Occupational History     Not on file.     Social History Main Topics     Smoking status: Former Smoker     Packs/day: 0.50     Years: 2.00     Types: Cigarettes     Quit date: 9/26/1998     Smokeless tobacco: Never Used     Alcohol use 1.2 oz/week     2 Standard drinks or equivalent per week     Drug use: No     Sexual activity: Not on file     Other Topics Concern     Not on file     Social History Narrative     Allergies:  Allergies   Allergen Reactions     Erythromycin Hives       Medications:  Current Outpatient Prescriptions   Medication Sig Dispense Refill     ASPIRIN PO Take 325 mg by mouth daily       ondansetron (ZOFRAN) 4 MG tablet Take 1 tablet (4 mg) by mouth every 8 hours as  needed for nausea 30 tablet 0     amLODIPine (NORVASC) 5 MG tablet Take 2 tablets (10 mg) by mouth daily 60 tablet 3     cholecalciferol (VITAMIN  -D) 1000 UNITS capsule Take 1 capsule (1,000 Units) by mouth daily 30 capsule 3     mycophenolic acid (MYFORTIC - GENERIC EQUIVALENT) 180 MG EC tablet Take 3 tablets (540 mg) by mouth 2 times daily 180 tablet 11     tacrolimus (PROGRAF - GENERIC EQUIVALENT) 0.5 MG capsule Take 1 capsule (0.5 mg) by mouth every 12 hours Use for dose adjustments. 60 capsule 11     tacrolimus (PROGRAF - GENERIC EQUIVALENT) 1 MG capsule Take 4 capsules (4 mg) by mouth 2 times daily 240 capsule 11     acetaminophen (TYLENOL) 325 MG tablet Take 2 tablets (650 mg) by mouth every 4 hours as needed for mild pain or fever 100 tablet 0     Vitals:  AF, /76, P86, Sat 100%    Exam:   GENERAL APPEARANCE: alert and no distress  HENT: whitish tongue improving  LYMPHATICS: no cervical or supraclavicular nodes  RESP: lungs clear to auscultation - no rales, rhonchi or wheezes  CV: regular rhythm, normal rate, no rub, no murmur  EDEMA: No LE edema bilaterally  ABDOMEN: soft, nondistended, nontender, bowel sounds normal  MS: extremities normal - no gross deformities noted, no evidence of inflammation in joints, no muscle tenderness  SKIN: no rash  TX KIDNEY: normal, healing well    Results:   Reviewed    Patient was seen and evaluated by me, Benjamin Beltran MD. I have reviewed the note and agree with the the plan of care as documented by the fellow.    Assessment and Plan:  1. DDKT - Patient had DGF, which resolved, Cr 2-2.2 mg/dL, Bx with ATN patient continued to stabilize   2. Immunosuppression currently on dual regimen with good tolerance   3. Hypertension well controlled   4. Anemia - slowly improving   5. Renal Secondary Hyperparathyroidism - mildly elevated PTH at 149.  Patient has mild vitamin d insufficiency.  Recheck PTH at 3 months.  6. Prophylaxis - EBV D+R+, CMV D+R+.  Valcyte for 3 months  (almost completed), Bactrim indefinitely. Will need dose adjustment with further improvement in Cr    Assessment and plan was discussed with patient and she voiced her understanding and agreement.      Reason for Visit:  Ms. Willingham is here for hospital follow up following kidney transplant.    HPI:   Rosibel Willingham is a 41 year old female with ESKD from unknown etiology and is status post DDKT on 4/23/17.         Transplant Hx:       Tx: DDKT  Date: 4/23/17       Present Maintenance IS: Tacrolimus and Mycophenolate mofetil       Baseline Creatinine: TBD       Recent DSA: No         Biopsy: No    She feels well today, she is visiting from [place]. She is now well established with a local nephrologist. Reports no complaints. No NVD. Taking her medications regularly.     Home BP: 130's/80's      ROS:   A comprehensive review of systems was obtained and negative, except as noted in the HPI or PMH.    Active Medical Problems:  Patient Active Problem List   Diagnosis     Anemia in chronic renal disease     Secondary renal hyperparathyroidism (H)     Hypertension secondary to other renal disorders     Kidney replaced by transplant     Immunosuppressed status (H)     Vitamin D deficiency     Metabolic acidosis     Hypomagnesemia     Dehydration     Personal Hx:  Social History     Social History     Marital status:      Spouse name: N/A     Number of children: N/A     Years of education: N/A     Occupational History     Not on file.     Social History Main Topics     Smoking status: Former Smoker     Packs/day: 0.50     Years: 2.00     Types: Cigarettes     Quit date: 9/26/1998     Smokeless tobacco: Never Used     Alcohol use 1.2 oz/week     2 Standard drinks or equivalent per week     Drug use: No     Sexual activity: Not on file     Other Topics Concern     Not on file     Social History Narrative     Allergies:  Allergies   Allergen Reactions     Erythromycin Hives       Medications:  Current Outpatient Prescriptions  "  Medication Sig Dispense Refill     ASPIRIN PO Take 325 mg by mouth daily       ondansetron (ZOFRAN) 4 MG tablet Take 1 tablet (4 mg) by mouth every 8 hours as needed for nausea 30 tablet 0     amLODIPine (NORVASC) 5 MG tablet Take 2 tablets (10 mg) by mouth daily 60 tablet 3     cholecalciferol (VITAMIN  -D) 1000 UNITS capsule Take 1 capsule (1,000 Units) by mouth daily 30 capsule 3     mycophenolic acid (MYFORTIC - GENERIC EQUIVALENT) 180 MG EC tablet Take 3 tablets (540 mg) by mouth 2 times daily 180 tablet 11     tacrolimus (PROGRAF - GENERIC EQUIVALENT) 0.5 MG capsule Take 1 capsule (0.5 mg) by mouth every 12 hours Use for dose adjustments. 60 capsule 11     tacrolimus (PROGRAF - GENERIC EQUIVALENT) 1 MG capsule Take 4 capsules (4 mg) by mouth 2 times daily 240 capsule 11     acetaminophen (TYLENOL) 325 MG tablet Take 2 tablets (650 mg) by mouth every 4 hours as needed for mild pain or fever 100 tablet 0     Vitals:  Blood pressure 129/78, pulse 84, temperature 98.4  F (36.9  C), temperature source Oral, height 1.575 m (5' 2\"), weight 48.1 kg (106 lb).    Exam:   GENERAL APPEARANCE: alert and no distress  HENT: whitish tongue improving  LYMPHATICS: no cervical or supraclavicular nodes  RESP: lungs clear to auscultation - no rales, rhonchi or wheezes  CV: regular rhythm, normal rate, no rub, no murmur  EDEMA: No LE edema bilaterally  ABDOMEN: soft, nondistended, nontender, bowel sounds normal  MS: extremities normal - no gross deformities noted, no evidence of inflammation in joints, no muscle tenderness  SKIN: no rash  TX KIDNEY: normal, healing well    Results:   Reviewed      Again, thank you for allowing me to participate in the care of your patient.      Sincerely,    Benjamin Beltran MD      "

## 2017-07-13 ENCOUNTER — TELEPHONE (OUTPATIENT)
Dept: TRANSPLANT | Facility: CLINIC | Age: 42
End: 2017-07-13

## 2017-07-20 NOTE — PROGRESS NOTES
Assessment and Plan:  1. DDKT - Patient had DGF, which resolved, Cr 2-2.2 mg/dL, Bx with ATN patient continued to stabilize   2. Immunosuppression currently on dual regimen with good tolerance   3. Hypertension well controlled   4. Anemia - slowly improving   5. Renal Secondary Hyperparathyroidism - mildly elevated PTH at 149.  Patient has mild vitamin d insufficiency.  Recheck PTH at 3 months.  6. Prophylaxis - EBV D+R+, CMV D+R+.  Valcyte for 3 months (almost completed), Bactrim indefinitely. Will need dose adjustment with further improvement in Cr    Assessment and plan was discussed with patient and she voiced her understanding and agreement.      Reason for Visit:  Ms. Willingham is here for hospital follow up following kidney transplant.    HPI:   Rosibel Willingham is a 41 year old female with ESKD from unknown etiology and is status post DDKT on 4/23/17.         Transplant Hx:       Tx: DDKT  Date: 4/23/17       Present Maintenance IS: Tacrolimus and Mycophenolate mofetil       Baseline Creatinine: TBD       Recent DSA: No         Biopsy: No    She feels well today, she is visiting from [place]. She is now well established with a local nephrologist. Reports no complaints. No NVD. Taking her medications regularly.     Home BP: 130's/80's      ROS:   A comprehensive review of systems was obtained and negative, except as noted in the HPI or PMH.    Active Medical Problems:  Patient Active Problem List   Diagnosis     Anemia in chronic renal disease     Secondary renal hyperparathyroidism (H)     Hypertension secondary to other renal disorders     Kidney replaced by transplant     Immunosuppressed status (H)     Vitamin D deficiency     Metabolic acidosis     Hypomagnesemia     Dehydration     Personal Hx:  Social History     Social History     Marital status:      Spouse name: N/A     Number of children: N/A     Years of education: N/A     Occupational History     Not on file.     Social History Main Topics      "Smoking status: Former Smoker     Packs/day: 0.50     Years: 2.00     Types: Cigarettes     Quit date: 9/26/1998     Smokeless tobacco: Never Used     Alcohol use 1.2 oz/week     2 Standard drinks or equivalent per week     Drug use: No     Sexual activity: Not on file     Other Topics Concern     Not on file     Social History Narrative     Allergies:  Allergies   Allergen Reactions     Erythromycin Hives       Medications:  Current Outpatient Prescriptions   Medication Sig Dispense Refill     ASPIRIN PO Take 325 mg by mouth daily       ondansetron (ZOFRAN) 4 MG tablet Take 1 tablet (4 mg) by mouth every 8 hours as needed for nausea 30 tablet 0     amLODIPine (NORVASC) 5 MG tablet Take 2 tablets (10 mg) by mouth daily 60 tablet 3     cholecalciferol (VITAMIN  -D) 1000 UNITS capsule Take 1 capsule (1,000 Units) by mouth daily 30 capsule 3     mycophenolic acid (MYFORTIC - GENERIC EQUIVALENT) 180 MG EC tablet Take 3 tablets (540 mg) by mouth 2 times daily 180 tablet 11     tacrolimus (PROGRAF - GENERIC EQUIVALENT) 0.5 MG capsule Take 1 capsule (0.5 mg) by mouth every 12 hours Use for dose adjustments. 60 capsule 11     tacrolimus (PROGRAF - GENERIC EQUIVALENT) 1 MG capsule Take 4 capsules (4 mg) by mouth 2 times daily 240 capsule 11     acetaminophen (TYLENOL) 325 MG tablet Take 2 tablets (650 mg) by mouth every 4 hours as needed for mild pain or fever 100 tablet 0     Vitals:  Blood pressure 129/78, pulse 84, temperature 98.4  F (36.9  C), temperature source Oral, height 1.575 m (5' 2\"), weight 48.1 kg (106 lb).    Exam:   GENERAL APPEARANCE: alert and no distress  HENT: whitish tongue improving  LYMPHATICS: no cervical or supraclavicular nodes  RESP: lungs clear to auscultation - no rales, rhonchi or wheezes  CV: regular rhythm, normal rate, no rub, no murmur  EDEMA: No LE edema bilaterally  ABDOMEN: soft, nondistended, nontender, bowel sounds normal  MS: extremities normal - no gross deformities noted, no evidence " of inflammation in joints, no muscle tenderness  SKIN: no rash  TX KIDNEY: normal, healing well    Results:   Reviewed  Answers for HPI/ROS submitted by the patient on 7/1/2017   General Symptoms: No  Skin Symptoms: No  HENT Symptoms: No  EYE SYMPTOMS: No  HEART SYMPTOMS: No  LUNG SYMPTOMS: No  INTESTINAL SYMPTOMS: Yes  URINARY SYMPTOMS: No  GYNECOLOGIC SYMPTOMS: No  BREAST SYMPTOMS: No  SKELETAL SYMPTOMS: Yes  BLOOD SYMPTOMS: No  NERVOUS SYSTEM SYMPTOMS: Yes  MENTAL HEALTH SYMPTOMS: No  Heart burn or indigestion: No  Nausea: Yes  Vomiting: No  Abdominal pain: No  Bloating: No  Constipation: No  Diarrhea: Yes  Blood in stool: Yes  Black stools: No  Rectal or Anal pain: No  Fecal incontinence: No  Rectal bleeding: No  Yellowing of skin or eyes: No  Vomit with blood: No  Change in stools: No  Hemorrhoids: Yes  Back pain: Yes  Muscle aches: No  Neck pain: No  Swollen joints: No  Joint pain: No  Bone pain: No  Muscle cramps: Yes  Muscle weakness: No  Joint stiffness: No  Bone fracture: No  Trouble with coordination: No  Dizziness or trouble with balance: No  Fainting or black-out spells: No  Memory loss: No  Headache: No  Seizures: No  Speech problems: No  Tingling: No  Tremor: Yes  Weakness: No  Difficulty walking: No  Paralysis: No  Numbness: No

## 2017-07-28 NOTE — LETTER
OUTPATIENT LABORATORY TEST ORDER    Patient Name:Rosibel Willingham   Transplant Date: 4/23/2017  YOB: 1975  Issue Date & Time: 7/28/2017  1:45 PM     [facility] MR: [id number]  Expiration Date:  (1 year after date issued)        Diagnoses: Aftercare following organ transplant (ICD-10 Z48.288)   Kidney Transplant (ICD-10  Z94.0)   Long term use of medications (ICD-10  Z79.899)     ?Lab results to be available on the same day drawn.   ?Patient should release information to the [facility], [place], Transplant Center.  ?Please fax to the Transplant Center at [phone].    2x/week, Months 2-3 post-transplant    5/24/2017 - 7/24/2017       1x/week, Months 4-6 post-transplant    7/25/2017 - 10/25/2017  Every 2 weeks, Months 7-9 post-transplant    10/26/2017 - 1/26/2018  Every 3 weeks, Months 10-12 post-transplant   1/27/2018 - 4/23/2018       ?Hemogram and Platelet   ?Basic Metabolic Panel (Sodium, Potassium,Chloride, CO2, Creatinine, Urea Nitrogen, Glucose, Calcium)   ?Prograf/Tacrolimus drug level     Monthly   ? BK PCR Quantitative (Polyoma virus - blood in purple top tube to Reference Lab)    At 6 & 12 months post-transplant         10/2017 & 4/2018   ? HBsurfaceAb, HBcoreAb, HCV at 6 months only -   ? Liver Function Tests(Bilirubin Direct/Total, AST, ALT, Alkaline Phosphatase)   ?Complete Lipid Panel fasting (Cholesterol, Triglycerides, HDL, LDL)   ? Random Urine for Protein/Creatinineratio    At month(s)  2, 4, 6, 9, 12     OVERDUE 6/2017, 8/2017, 10/2017, 1/2018, 4/2018                  ? PRA/DSA level (mailers provided by the patient)                     If you have any questions, please call The [facility] at [phone] or [phone].    Please faxall results to [phone].  .

## 2017-08-03 ENCOUNTER — DOCUMENTATION ONLY (OUTPATIENT)
Dept: TRANSPLANT | Facility: CLINIC | Age: 42
End: 2017-08-03

## 2017-08-03 NOTE — PROGRESS NOTES
DATE:  8/3/2017   TIME OF RECEIPT FROM LAB:  9:39  LAB TEST:  WBC  LAB VALUE:  2.3  RESULTS GIVEN WITH READ-BACK TO (PROVIDER):  Given to Tx Coordinator; Chinyere Burnham  TIME LAB VALUE REPORTED TO PROVIDER:   9:42

## 2017-08-10 ENCOUNTER — TELEPHONE (OUTPATIENT)
Dept: TRANSPLANT | Facility: CLINIC | Age: 42
End: 2017-08-10

## 2017-08-10 NOTE — TELEPHONE ENCOUNTER
DATE:  8/10/2017   TIME OF RECEIPT FROM LAB:  8:48AM  LAB TEST:  WBC  LAB VALUE:  2.0  RESULTS GIVEN WITH READ-BACK TO (PROVIDER): verbally informed Chinyere Burnham RN  TIME LAB VALUE REPORTED TO PROVIDER:   8:48AM

## 2017-08-15 ENCOUNTER — TELEPHONE (OUTPATIENT)
Dept: TRANSPLANT | Facility: CLINIC | Age: 42
End: 2017-08-15

## 2017-08-15 DIAGNOSIS — Z48.298 AFTERCARE FOLLOWING ORGAN TRANSPLANT: ICD-10-CM

## 2017-08-15 NOTE — LETTER
PHYSICIAN ORDERS      DATE & TIME ISSUED: 2017 7:52 AM  PATIENT NAME: Rosibel Willingham   : 1975     [facility] MR#  [id number]     DIAGNOSIS:  Kidney Transplant  ICD-10 CODE: Z94.0     Please complete the following labs at the next routine transplant lab draw:  EBV PCR QT  CMV PCR QT  Mycophenolic acid  Random urine protein      Any questions please call: [phone]    Please fax these results to [phone].      Benjamin Beltran M.D.

## 2017-08-15 NOTE — LETTER
OUTPATIENT LABORATORY TEST ORDER    Patient Name:Rosibel Willingham   Transplant Date: 4/23/2017  YOB: 1975  Issue Date & Time: 8/18/2017  7:50 AM     [facility] MR: [id number]  Expiration Date:  (1 year after date issued)        Diagnoses: Aftercare following organ transplant (ICD-10 Z48.288)   Kidney Transplant (ICD-10  Z94.0)   Long term use of medications (ICD-10  Z79.899)     ?Lab results to be available on the same day drawn.   ?Patient should release information to the [facility], [place], Transplant Center.  ?Please fax to the Transplant Center at [phone].    1x/week, Months 4-6 post-transplant    7/25/2017 - 10/25/2017  Every 2 weeks, Months 7-9 post-transplant    10/26/2017 - 1/26/2018  Every 3 weeks, Months 10-12 post-transplant   1/27/2018 - 4/23/2018       ?Hemogram and Platelet   ?Basic Metabolic Panel (Sodium, Potassium,Chloride, CO2, Creatinine, Urea Nitrogen, Glucose, Calcium)   ?Prograf/Tacrolimus drug level     Monthly  Due: NOW   ? BK PCR Quantitative (Polyoma virus - blood in purple top tube to Reference Lab)    At 6 & 12 months post-transplant         10/2017 & 4/2018   ? HBsurfaceAb, HBcoreAb, HCV at 6 months only -   ? Liver Function Tests(Bilirubin Direct/Total, AST, ALT, Alkaline Phosphatase)   ?Complete Lipid Panel fasting (Cholesterol, Triglycerides, HDL, LDL)   ? Random Urine for Protein/Creatinineratio    At month(s)  4, 6, 9, 12     Due: NOW 8/2017, 10/2017, 1/2018, 4/2018                  ? PRA/DSA level (mailers provided by the patient)                     If you have any questions, please call The [facility]plant [facility] at [phone] or [phone].    Please faxall results to [phone].  .

## 2017-08-18 ENCOUNTER — TELEPHONE (OUTPATIENT)
Dept: TRANSPLANT | Facility: CLINIC | Age: 42
End: 2017-08-18

## 2017-08-18 RX ORDER — SULFAMETHOXAZOLE AND TRIMETHOPRIM 400; 80 MG/1; MG/1
TABLET ORAL
Qty: 15 TABLET | Refills: 1
Start: 2017-08-18 | End: 2018-06-11

## 2017-08-18 RX ORDER — VALGANCICLOVIR 450 MG/1
TABLET, FILM COATED ORAL
Qty: 15 TABLET | Refills: 1
Start: 2017-08-18 | End: 2018-06-11

## 2017-08-21 NOTE — TELEPHONE ENCOUNTER
Please call again confirm that stopped Valcyte   Please confirm that she had the following labs drawn for BK and Donor Specific Antibodies   MPA level and urine protein     Issue WBC 2.0  Slightly lower   Called LVM   DUE for surveillance  BK Donor Specific Antibodies  With the next set of transplant  Labs   Plan -  Check CMV PCR QT ,EBV PCR QT MPA level and urine protein   Please review dose of immunosuppression medications/ Confirm stopped Valcyte  TMP sulfa

## 2017-08-21 NOTE — TELEPHONE ENCOUNTER
Left message for patient to confirm that she stopped Valcyte.  Updated lab orders sent to patients lab on 8/18/2017, instructing patient to have CMV, EBV BK and urine protein completed at the next txp lab draw.

## 2017-08-24 ENCOUNTER — RESULTS ONLY (OUTPATIENT)
Dept: OTHER | Facility: CLINIC | Age: 42
End: 2017-08-24

## 2017-08-24 ENCOUNTER — TELEPHONE (OUTPATIENT)
Dept: TRANSPLANT | Facility: CLINIC | Age: 42
End: 2017-08-24

## 2017-08-24 DIAGNOSIS — Z48.298 AFTERCARE FOLLOWING ORGAN TRANSPLANT: ICD-10-CM

## 2017-08-24 DIAGNOSIS — Z94.0 KIDNEY REPLACED BY TRANSPLANT: ICD-10-CM

## 2017-08-24 PROCEDURE — 86832 HLA CLASS I HIGH DEFIN QUAL: CPT | Performed by: TRANSPLANT SURGERY

## 2017-08-24 PROCEDURE — 86833 HLA CLASS II HIGH DEFIN QUAL: CPT | Performed by: TRANSPLANT SURGERY

## 2017-08-24 NOTE — TELEPHONE ENCOUNTER
WBC 2.2   -stable    Please call confirm that she has received previous epic message  Regarding her medications WBC   The need to check CMV /EBV

## 2017-08-24 NOTE — TELEPHONE ENCOUNTER
DATE:  8/24/2017   TIME OF RECEIPT FROM LAB:  0825  LAB TEST:  WBC  LAB VALUE:  2.2  RESULTS GIVEN WITH READ-BACK TO (PROVIDER):  Chinyere Josue  TIME LAB VALUE REPORTED TO PROVIDER:   [id number]

## 2017-08-28 LAB — PRA DONOR SPECIFIC ABY: NORMAL

## 2017-08-28 NOTE — TELEPHONE ENCOUNTER
Left message for patient regarding confirmation that she had stopped Valcyte.  Also, instructed patient to have CMV/EBV PCR QT drawn at her next routine transplant lab draw.  Lab orders faxed to lab 8/18/17.

## 2017-08-29 ENCOUNTER — TELEPHONE (OUTPATIENT)
Dept: TRANSPLANT | Facility: CLINIC | Age: 42
End: 2017-08-29

## 2017-09-06 DIAGNOSIS — N18.6 END STAGE RENAL DISEASE (H): ICD-10-CM

## 2017-09-06 DIAGNOSIS — E87.20 ACIDOSIS: ICD-10-CM

## 2017-09-06 DIAGNOSIS — D84.9 IMMUNOSUPPRESSION (H): ICD-10-CM

## 2017-09-06 DIAGNOSIS — E83.42 HYPOMAGNESEMIA: ICD-10-CM

## 2017-09-06 DIAGNOSIS — Z94.0 KIDNEY REPLACED BY TRANSPLANT: Primary | ICD-10-CM

## 2017-09-06 DIAGNOSIS — E55.9 VITAMIN D DEFICIENCY: ICD-10-CM

## 2017-09-06 RX ORDER — TACROLIMUS 1 MG/1
3 CAPSULE ORAL 2 TIMES DAILY
Qty: 180 CAPSULE | Refills: 11 | Status: SHIPPED | OUTPATIENT
Start: 2017-09-06 | End: 2017-09-20

## 2017-09-06 RX ORDER — TACROLIMUS 0.5 MG/1
CAPSULE ORAL
Qty: 60 CAPSULE | Refills: 11
Start: 2017-09-06 | End: 2017-09-20

## 2017-09-07 NOTE — LETTER
OUTPATIENT LABORATORY TEST ORDER    Patient Name:Rosibel Willingham   Transplant Date: 4/23/2017  YOB: 1975  Issue Date & Time: 9/7/2017  9:20 AM     [facility] MR: [id number]  Expiration Date:  (1 year after date issued)        Diagnoses: Aftercare following organ transplant (ICD-10 Z48.288)   Kidney Transplant (ICD-10  Z94.0)   Long term use of medications (ICD-10  Z79.899)     ?Lab results to be available on the same day drawn.   ?Patient should release information to the [facility].  ?Please fax to the Transplant Center at [phone].    1x/week, Months 4-6 post-transplant    7/25/2017 - 10/25/2017  Every 2 weeks, Months 7-9 post-transplant    10/26/2017 - 1/26/2018  Every 3 weeks, Months 10-12 post-transplant   1/27/2018 - 4/23/2018       ?Hemogram and Platelet   ?Basic Metabolic Panel (Sodium, Potassium,Chloride, CO2, Creatinine, Urea Nitrogen, Glucose, Calcium)   ?Prograf/Tacrolimus drug level     Monthly     ? BK PCR Quantitative (Polyoma virus - blood in purple top tube to Reference Lab)    At 6 & 12 months post-transplant         10/2017 & 4/2018   ? HBsurfaceAb, HBcoreAb, HCV at 6 months only -   ? Liver Function Tests(Bilirubin Direct/Total, AST, ALT, Alkaline Phosphatase)   ?Complete Lipid Panel fasting (Cholesterol, Triglycerides, HDL, LDL)   ? Random Urine for Protein/Creatinineratio    At month(s)  6, 9, 12     Due: 10/2017, 1/2018, 4/2018                  ? PRA/DSA level (mailers provided by the patient)                     If you have any questions, please call The Transplant Center at [phone] or [phone].    Please faxall results to [phone].  .

## 2017-09-16 LAB
DONOR IDENTIFICATION: NORMAL
DSA COMMENTS: NORMAL
DSA PRESENT: NO
DSA TEST METHOD: NORMAL
ORGAN: NORMAL
SA1 CELL: NORMAL
SA1 COMMENTS: NORMAL
SA1 HI RISK ABY: NORMAL
SA1 MOD RISK ABY: NORMAL
SA1 TEST METHOD: NORMAL
SA2 CELL: NORMAL
SA2 COMMENTS: NORMAL
SA2 HI RISK ABY UA: NORMAL
SA2 MOD RISK ABY: NORMAL
SA2 TEST METHOD: NORMAL

## 2017-09-19 DIAGNOSIS — Z94.0 KIDNEY REPLACED BY TRANSPLANT: Primary | ICD-10-CM

## 2017-09-19 DIAGNOSIS — N18.6 END STAGE RENAL DISEASE (H): ICD-10-CM

## 2017-09-19 DIAGNOSIS — E83.42 HYPOMAGNESEMIA: ICD-10-CM

## 2017-09-19 DIAGNOSIS — E87.20 ACIDOSIS: ICD-10-CM

## 2017-09-19 DIAGNOSIS — E55.9 VITAMIN D DEFICIENCY: ICD-10-CM

## 2017-09-19 DIAGNOSIS — D84.9 IMMUNOSUPPRESSION (H): ICD-10-CM

## 2017-09-19 NOTE — TELEPHONE ENCOUNTER
Issue Prograf tacrolimus level 14.5  above goal level   Please call confirm 12 hour trough Prograf tacro   Confirm no recent dose change of Prograf tacrolimus   Confirm any new medications   Recommend repeating transplant labs   If accurate consider lowering Prograf tacrolimus

## 2017-09-20 RX ORDER — TACROLIMUS 1 MG/1
2 CAPSULE ORAL 2 TIMES DAILY
Qty: 120 CAPSULE | Refills: 11 | Status: SHIPPED | OUTPATIENT
Start: 2017-09-20 | End: 2017-11-03

## 2017-09-20 RX ORDER — TACROLIMUS 0.5 MG/1
0.5 CAPSULE ORAL 2 TIMES DAILY
Qty: 60 CAPSULE | Refills: 11 | Status: SHIPPED | OUTPATIENT
Start: 2017-09-20 | End: 2017-11-03

## 2017-09-20 NOTE — TELEPHONE ENCOUNTER
Spoke to patient regarding tac level = 14.5, above goal.  Patient confirms current dose and good 12 hour trough.  Patient will repeat labs tomorrow, 9/21/17.  Instructed patient lower tacrolimus dose to 2.5 mg BID.  Patient has concerns regarding loose/mucus stools X 1 week, around 3 stools daily in the AM and sharp/stabing pain over kidney area X 1 week.  Informed patient to f/u with PCP regarding these issues if symptoms continue.  Patient does not have any upcoming appt's with [facility].  Instructed patient to schedule if coming back to [place] in the future.

## 2017-09-21 ENCOUNTER — TELEPHONE (OUTPATIENT)
Dept: TRANSPLANT | Facility: CLINIC | Age: 42
End: 2017-09-21

## 2017-10-19 ENCOUNTER — RESULTS ONLY (OUTPATIENT)
Dept: OTHER | Facility: CLINIC | Age: 42
End: 2017-10-19

## 2017-10-19 DIAGNOSIS — Z94.0 KIDNEY REPLACED BY TRANSPLANT: ICD-10-CM

## 2017-10-19 DIAGNOSIS — Z48.298 AFTERCARE FOLLOWING ORGAN TRANSPLANT: ICD-10-CM

## 2017-10-19 PROCEDURE — 86832 HLA CLASS I HIGH DEFIN QUAL: CPT | Performed by: TRANSPLANT SURGERY

## 2017-10-19 PROCEDURE — 86833 HLA CLASS II HIGH DEFIN QUAL: CPT | Performed by: TRANSPLANT SURGERY

## 2017-10-24 ENCOUNTER — TELEPHONE (OUTPATIENT)
Dept: TRANSPLANT | Facility: CLINIC | Age: 42
End: 2017-10-24

## 2017-10-24 LAB — PRA DONOR SPECIFIC ABY: NORMAL

## 2017-10-24 NOTE — IP AVS SNAPSHOT
MRN:[id number]                      After Visit Summary   2017    Rosibel Willingham    MRN: [id number]           Thank you!     Thank you for choosing [facility] for your care. Our goal is always to provide you with excellent care. Hearing back from our patients is one way we can continue to improve our services. Please take a few minutes to complete the written survey that you may receive in the mail after you visit with us. Thank you!        Patient Information     Date Of Birth          1975        Designated Caregiver       Most Recent Value    Caregiver    Will someone help with your care after discharge? yes    Name of designated caregiver Ian    Phone number of caregiver [phone]    Caregiver address [place]      About your hospital stay     You were admitted on:  2017 You last received care in the:  [address] [facility] [place]    You were discharged on:  2017        Reason for your hospital stay       Patient was admitted after a  donor kidney transplant with ureteral stent placement on 17.                  Who to Call     For medical emergencies, please call 911.  For non-urgent questions about your medical care, please call your primary care provider or clinic, None  For questions related to your surgery, please call your surgery clinic        Attending Provider     Provider Specialty    Leanne Montelongo MD Transplant       Primary Care Provider    Physician No Ref-Primary       No address on file        After Care Instructions     Discharge Instructions       Record daily urine output.            Tubes and drains       You are going home with the following tubes or drains: OCTAVIO.  Empty and record output daily.  Tube cares per hospital or home care instructions                  Follow-up Appointments     Adult [facility] Follow-up and recommended labs and tests       Over the next 3-5 days you will be seen in the [facility] (.  [phone]) .  Your labs will be drawn just prior to your appointment between 6:30-7:00 am.  DO NOT take your medications prior to having labs drawn. Please bring all your medications with you from home to take after labs are drawn.    LABS:    CBC, BMP, Mg, Phos and tacrolimus levels to be drawn daily while in ATC, then every Monday, Wednesday, Friday by home health care nurse if arranged, or at an outpatient lab.     FOLLOW UP APPOINTMENTS:  Remember to always bring an updated medication list to all appointments.     An appointment with your transplant surgeon will be scheduled for approximately 2 weeks following discharge from the hospital.  Your transplant surgeon is: Dr. Montelongo.  You will follow up with transplant nephrology in clinic at 1 and 3 months and then annually.     Follow up with primary care provider in 4-8 weeks (after return home). (Pt to schedule)    You have a ureteral stent in place which needs to be removed in 4-6 weeks. If a  does not contact you for this, please contact your transplant coordinator.  Call scheduling at [phone] (option 1) if you have not heard about your appointments within 48 hours after discharge.      WHEN TO CONTACT YOUR  COORDINATOR:  Transplant Coordinator [phone]  Notify your coordinator if you have pain over your kidney, increased redness or drainage from your incision, fever greater than 100.5F, or decreased urine output.  Notify your coordinator immediately if you are ever unable to take your immunosuppressive medications for any reason.  If it is outside of office hours, please call the hospital switchboard at [phone] and ask to have the kidney transplant surgery fellow paged for urgent medical questions, or present to the emergency department.    OTHER DISCHARGE INSTRUCTIONS:  OCTAVIO plan: Please monitor color and amount of output. Drain will be removed in clinic.     Monitor blood pressure and weight daily initially post  transplant.    Walk at least four times a day, lift no greater than 10 pounds for 6-8 weeks from the time of surgery.  No driving while taking narcotics or 3 weeks after surgery.    Diet recommendations post-transplant: Low potassium until kidney function has normalized.  Heart healthy dietary habits long term (low saturated/trans fat, low sodium). High protein diet x 8 weeks. Practice food safety precautions.            Follow Up and recommended labs and tests       Outpatient Hemodialysis  [facility]  [address]  [address]  [place] [address]  Ph: [phone]  Fax: [phone]    Days: [place]  Time: 12:30pm    Start Date: Friday, April 28th    Nephrologist:    IF you would need continued hemodialysis in [place]--please transfer back to:     6/4/2016  Hemo-In Center T-TH-SAT PM  Start per 4855   [facility] [address] Phone #: [phone]  [place]  [address]                  Your next 10 appointments already scheduled     Apr 28, 2017  7:00 AM CDT   (Arrive by 6:45 AM)   New Transplant Visit with UC SPEC INFUSION, UC 42 ATC   [facility] ([facility])    [address] [place] [id number]   [phone]            Apr 28, 2017  8:00 AM CDT   (Arrive by 7:45 AM)   Kidney Transplant Discharge with Benjamin Beltran MD   [facility] ([facility])    [address] [place] [address]   [phone]            Apr 29, 2017  7:00 AM CDT   (Arrive by 6:45 AM)   New Transplant Visit with UC SPEC INFUSION, UC 43 ATC   [facility] ([facility])    [address] [place] [address]   [phone]            Apr 30, 2017  7:00 AM CDT   (Arrive by 6:45 AM)   New Transplant Visit  with UC SPEC INFUSION, UC 43 ATC   [facility] ([facility])    [address] [place] [address]  2nd [facility] [id number] [phone]            May 15, 2017  2:00 PM CDT   (Arrive by 1:45 PM)   Kidney Post Op with Leanne Montelongo MD   [facility] Solid Organ Transplant ([facility])    [address] [place] [address]  3rd [facility] [id number] [phone]            May 22, 2017  1:00 PM CDT   (Arrive by 12:30 PM)   Return Kidney Transplant with Benjamin Beltran MD   [facility] Nephrology ([facility])    [address] [place] [id number] [phone]            Jul 24, 2017  1:10 PM CDT   (Arrive by 12:40 PM)   Return Kidney Transplant with Benjamin Beltran MD   [facility] Nephrology ([facility])    [address] [place] [address] [phone]              Additional Services     Home care nursing referral       ___________________________________________________  [facility]  Phone: [phone]  Fax: [phone]  ___________________________________________________  ++++Pt to stay locally at her sister's home++++      [place], [place]    Pt will have Outpatient Hemodialysis Q M-W-F @ 12:30pm (they cannot draw immunosuppression labs)      RN skilled nursing visit, to begin after ATC clinic ([phone]) appointments are completed--approx start date 5/3 or 5/5     RN to assess vital signs and weight, respiratory and cardiac status, patients ability to take and record daily blood pressure, temp and weight, pain level and activity tolerance, incision for signs/symptoms of infection, hydration, nutrition and bowel status and home safety.  RN to teach medication management.    RN to provide morning lab draws, Q M-W-F and report results to Outpatient Care Coordinator: Chinyere Burnham  Ph: [phone]  Fax:  "[phone]    Your provider has ordered home care nursing services. If you have not been contacted within 2 days of your discharge please call the inpatient department phone number at [phone] .                  Further instructions from your care team       Diet recommendations post-transplant: High protein diet x 8 weeks.  Heart healthy dietary habits long term (low saturated/trans fat, low sodium). Practice food safety precautions. See nutrition section of transplant handbook for more information.    ________________________________________________________  Discharge RN please fax discharge orders to home care agency: [facility]   and  [facility]  ________________________________________________________    Pending Results     No orders found from 4/20/2017 to 4/23/2017.            Statement of Approval     Ordered          04/27/17 1107  I have reviewed and agree with all the recommendations and orders detailed in this document.  EFFECTIVE NOW     Approved and electronically signed by:  Tawny Macias PA-C             Admission Information     Date & Time Provider Department Dept. Phone    4/22/2017 Leanne Montelongo MD [address] [facility] [phone]      Your Vitals Were     Blood Pressure Pulse Temperature Respirations Height Weight    160/97 91 98.4  F (36.9  C) (Oral) 16 1.575 m (5' 2\") 58 kg (127 lb 14.4 oz)    Pulse Oximetry BMI (Body Mass Index)                97% 23.39 kg/m2          MyChart Information     [employer] lets you send messages to your doctor, view your test results, renew your prescriptions, schedule appointments and more. To sign up, go to [email or web address] . Click on \"Log in\" on the left side of the screen, which will take you to the Welcome page. Then click on \"Sign up Now\" on the right side of the page.     You will be asked to enter the access code listed below, as well as some personal information. Please follow the directions to create your username and " password.     Your access code is: [email or web address]  Expires: 2017  2:16 PM     Your access code will  in 90 days. If you need help or a new code, please call your [facility] clinic or [phone].        Care EveryWhere ID     This is your Care EveryWhere ID. This could be used by other organizations to access your [facility] medical records  [id number]           Review of your medicines      START taking        Dose / Directions    acetaminophen 325 MG tablet   Commonly known as:  TYLENOL   Used for:  Kidney replaced by transplant        Dose:  650 mg   Take 2 tablets (650 mg) by mouth every 4 hours as needed for mild pain or fever   Quantity:  100 tablet   Refills:  0       aspirin 325 MG EC tablet   Used for:  Kidney replaced by transplant        Dose:  325 mg   Take 1 tablet (325 mg) by mouth daily   Quantity:  30 tablet   Refills:  11       furosemide 40 MG tablet   Commonly known as:  LASIX   Used for:  Kidney replaced by transplant        Dose:  80 mg   Take 2 tablets (80 mg) by mouth 2 times daily for 14 days   Quantity:  56 tablet   Refills:  0       mycophenolate 250 MG capsule   Commonly known as:  CELLCEPT - GENERIC EQUIVALENT   Used for:  Kidney replaced by transplant        Dose:  750 mg   Take 3 capsules (750 mg) by mouth 2 times daily   Quantity:  180 capsule   Refills:  11       senna-docusate 8.6-50 MG per tablet   Commonly known as:  SENOKOT-S;PERICOLACE   Used for:  Kidney replaced by transplant        Dose:  1-2 tablet   Take 1-2 tablets by mouth daily as needed for constipation   Quantity:  100 tablet   Refills:  0       sulfamethoxazole-trimethoprim 400-80 MG per tablet   Commonly known as:  BACTRIM/SEPTRA   Indication:  PCP prophylaxis   Used for:  Kidney replaced by transplant        Dose:  1 tablet   Start taking on:  2017   Take 1 tablet by mouth Every Mon, Wed, Fri Morning   Quantity:  12 tablet   Refills:  11       tacrolimus 1 MG capsule   Commonly known as:  PROGRAF -  GENERIC EQUIVALENT   Used for:  Kidney replaced by transplant        Dose:  2 mg   Take 2 capsules (2 mg) by mouth 2 times daily   Quantity:  120 capsule   Refills:  11       valGANciclovir 450 MG tablet   Commonly known as:  VALCYTE   Indication:  Treatment to Prevent Cytomegalovirus Disease   Used for:  Kidney replaced by transplant        Dose:  450 mg   Take 1 tablet (450 mg) by mouth twice a week   Quantity:  8 tablet   Refills:  2         CONTINUE these medicines which may have CHANGED, or have new prescriptions. If we are uncertain of the size of tablets/capsules you have at home, strength may be listed as something that might have changed.        Dose / Directions    amLODIPine 5 MG tablet   Commonly known as:  NORVASC   This may have changed:    - medication strength  - how much to take   Used for:  End stage renal disease (H)        Dose:  5 mg   Take 1 tablet (5 mg) by mouth daily   Quantity:  30 tablet   Refills:  3         STOP taking     metoprolol 25 MG 24 hr tablet   Commonly known as:  TOPROL-XL                Where to get your medicines      These medications were sent to [facility] [address] [address]     Phone:  [phone]     acetaminophen 325 MG tablet    amLODIPine 5 MG tablet    aspirin 325 MG EC tablet    furosemide 40 MG tablet    mycophenolate 250 MG capsule    senna-docusate 8.6-50 MG per tablet    sulfamethoxazole-trimethoprim 400-80 MG per tablet    tacrolimus 1 MG capsule    valGANciclovir 450 MG tablet                Protect others around you: Learn how to safely use, store and throw away your medicines at www.disposemymeds.org.             Medication List: This is a list of all your medications and when to take them. Check marks below indicate your daily home schedule. Keep this list as a reference.      Medications           Morning Afternoon Evening Bedtime As Needed    acetaminophen 325 MG tablet    Commonly known as:  TYLENOL   Take 2 tablets (650 mg) by mouth every 4 hours as needed for mild pain or fever   Last time this was given:  1,000 mg on 4/27/2017  6:04 AM                                amLODIPine 5 MG tablet   Commonly known as:  NORVASC   Take 1 tablet (5 mg) by mouth daily   Last time this was given:  5 mg on 4/27/2017  9:05 AM                                aspirin 325 MG EC tablet   Take 1 tablet (325 mg) by mouth daily   Last time this was given:  325 mg on 4/27/2017  9:05 AM                                furosemide 40 MG tablet   Commonly known as:  LASIX   Take 2 tablets (80 mg) by mouth 2 times daily for 14 days                                mycophenolate 250 MG capsule   Commonly known as:  CELLCEPT - GENERIC EQUIVALENT   Take 3 capsules (750 mg) by mouth 2 times daily   Last time this was given:  750 mg on 4/27/2017  9:05 AM                                senna-docusate 8.6-50 MG per tablet   Commonly known as:  SENOKOT-S;PERICOLACE   Take 1-2 tablets by mouth daily as needed for constipation   Last time this was given:  2 tablets on 4/26/2017  9:42 PM                                sulfamethoxazole-trimethoprim 400-80 MG per tablet   Commonly known as:  BACTRIM/SEPTRA   Take 1 tablet by mouth Every Mon, Wed, Fri Morning   Start taking on:  4/28/2017   Last time this was given:  1 tablet on 4/26/2017  8:35 AM                                tacrolimus 1 MG capsule   Commonly known as:  PROGRAF - GENERIC EQUIVALENT   Take 2 capsules (2 mg) by mouth 2 times daily   Last time this was given:  2 mg on 4/27/2017  9:05 AM                                valGANciclovir 450 MG tablet   Commonly known as:  VALCYTE   Take 1 tablet (450 mg) by mouth twice a week   Last time this was given:  450 mg on 4/27/2017  9:05 AM                                   25-Oct-2017 00:05

## 2017-10-25 NOTE — TELEPHONE ENCOUNTER
Left message for patient regarding tac level = 4.  Instructed patient return call and confirm current dose and good 12 hour trough level.  Also, patient overdue for BK, instructed patient remind lab at next routine txp lab draw.  Will updated medication list upon return patient call and obtain local Nephrologist info/last clinical note.

## 2017-10-25 NOTE — TELEPHONE ENCOUNTER
Issue Prograf tacrolimus level 4.0 previous levels             Previous  Prograf tacrolimus levels have been within in goal level               Issue confirm date lowered Prograf tacrolimus level 2.5 mg twice per day             Confirm 12 hour trough Prograf tacrolimus level -              If the above accurate increase Prograf tacrolimus level          2nd Issue over due for her BK PCR QT - Please have drawn with next lab       3rd Issue - Update MAR     4 th Issue - Have not received a clinical note from her local nephrologist - Please obtain  To be scan in for continuation of care

## 2017-10-27 NOTE — TELEPHONE ENCOUNTER
Patient returned call and confirmed current dose and good 12 hour trough level.  Patient states that she began taking 2.5 mg Tuesday of this week.  Patients lab faxed over BK PCR QT results from 10/19/17.  MAR updated.  Patient states that she will have appointment with local Nephrologist on Nov. 8, will send office visit notes at that time.  Patient states that her local Nephrologist is Dr. Sheriff, phone #[phone].

## 2017-10-31 NOTE — LETTER
OUTPATIENT LABORATORY TEST ORDER    Patient Name:Rosibel Willingham   Transplant Date: 4/23/2017  YOB: 1975  Issue Date & Time: 10/31/2017  10:58 AM     [facility] MR: [id number]  Expiration Date:  (1 year after date issued)        Diagnoses: Aftercare following organ transplant (ICD-10 Z48.288)   Kidney Transplant (ICD-10  Z94.0)   Long term use of medications (ICD-10  Z79.899)     ?Lab results to be available on the same day drawn.   ?Patient should release information to the [facility], [facility].     ?Please fax to the Transplant Center at [phone].      Every 2 weeks, Months 7-9 post-transplant    10/26/2017 - 1/26/2018  Every 3 weeks, Months 10-12 post-transplant   1/27/2018 - 4/23/2018       ?Hemogram and Platelet   ?Basic Metabolic Panel (Sodium, Potassium,Chloride, CO2, Creatinine, Urea Nitrogen, Glucose, Calcium)   ?Prograf/Tacrolimus drug level     Monthly     ? BK PCR Quantitative (Polyoma virus - blood in purple top tube to Reference Lab)    At 6 & 12 months post-transplant         10/2017 & 4/2018   ? HBsurfaceAb, HBcoreAb, HCV at 6 months only -   ? Liver Function Tests(Bilirubin Direct/Total, AST, ALT, Alkaline Phosphatase)   ?Complete Lipid Panel fasting (Cholesterol, Triglycerides, HDL, LDL)   ? Random Urine for Protein/Creatinineratio    At month(s)   9, 12     Due:  1/2018, 4/2018                  ? PRA/DSA level (mailers provided by the patient)                     If you have any questions, please call [facility] at [phone] or [phone].    Please faxall results to [phone].  .

## 2017-10-31 NOTE — PROGRESS NOTES
Updated current standing lab orders.  Mailed copy to patient and faxed copy to lab.  Updated patient's husbands [place] paperwork and mailed back to patient.

## 2017-11-03 DIAGNOSIS — E55.9 VITAMIN D DEFICIENCY: ICD-10-CM

## 2017-11-03 DIAGNOSIS — E87.20 ACIDOSIS: ICD-10-CM

## 2017-11-03 DIAGNOSIS — D84.9 IMMUNOSUPPRESSION (H): ICD-10-CM

## 2017-11-03 DIAGNOSIS — N18.6 END STAGE RENAL DISEASE (H): ICD-10-CM

## 2017-11-03 DIAGNOSIS — Z94.0 KIDNEY REPLACED BY TRANSPLANT: Primary | ICD-10-CM

## 2017-11-03 DIAGNOSIS — E83.42 HYPOMAGNESEMIA: ICD-10-CM

## 2017-11-03 RX ORDER — TACROLIMUS 0.5 MG/1
0.5 CAPSULE ORAL 2 TIMES DAILY
Qty: 60 CAPSULE | Refills: 11 | Status: SHIPPED | OUTPATIENT
Start: 2017-11-03 | End: 2017-11-09

## 2017-11-03 RX ORDER — TACROLIMUS 1 MG/1
2 CAPSULE ORAL 2 TIMES DAILY
Qty: 120 CAPSULE | Refills: 11 | Status: SHIPPED | OUTPATIENT
Start: 2017-11-03 | End: 2017-11-09

## 2017-11-09 DIAGNOSIS — E83.42 HYPOMAGNESEMIA: ICD-10-CM

## 2017-11-09 DIAGNOSIS — E55.9 VITAMIN D DEFICIENCY: ICD-10-CM

## 2017-11-09 DIAGNOSIS — E87.20 ACIDOSIS: ICD-10-CM

## 2017-11-09 DIAGNOSIS — Z94.0 KIDNEY REPLACED BY TRANSPLANT: Primary | ICD-10-CM

## 2017-11-09 DIAGNOSIS — D84.9 IMMUNOSUPPRESSION (H): ICD-10-CM

## 2017-11-09 DIAGNOSIS — N18.6 END STAGE RENAL DISEASE (H): ICD-10-CM

## 2017-11-14 RX ORDER — TACROLIMUS 0.5 MG/1
0.5 CAPSULE ORAL 2 TIMES DAILY
Qty: 60 CAPSULE | Refills: 11 | Status: SHIPPED | OUTPATIENT
Start: 2017-11-14 | End: 2017-12-04

## 2017-11-14 RX ORDER — TACROLIMUS 1 MG/1
2 CAPSULE ORAL 2 TIMES DAILY
Qty: 120 CAPSULE | Refills: 11 | Status: SHIPPED | OUTPATIENT
Start: 2017-11-14 | End: 2017-12-04

## 2017-12-01 DIAGNOSIS — E83.42 HYPOMAGNESEMIA: ICD-10-CM

## 2017-12-01 DIAGNOSIS — E55.9 VITAMIN D DEFICIENCY: ICD-10-CM

## 2017-12-01 DIAGNOSIS — Z94.0 KIDNEY REPLACED BY TRANSPLANT: Primary | ICD-10-CM

## 2017-12-01 DIAGNOSIS — N18.6 END STAGE RENAL DISEASE (H): ICD-10-CM

## 2017-12-01 DIAGNOSIS — E87.20 ACIDOSIS: ICD-10-CM

## 2017-12-01 DIAGNOSIS — D84.9 IMMUNOSUPPRESSION (H): ICD-10-CM

## 2017-12-04 RX ORDER — TACROLIMUS 1 MG/1
2 CAPSULE ORAL 2 TIMES DAILY
Qty: 120 CAPSULE | Refills: 11 | Status: SHIPPED | OUTPATIENT
Start: 2017-12-04 | End: 2018-06-12

## 2017-12-04 RX ORDER — TACROLIMUS 0.5 MG/1
CAPSULE ORAL
Qty: 60 CAPSULE | Refills: 11
Start: 2017-12-04 | End: 2018-06-12

## 2017-12-04 NOTE — TELEPHONE ENCOUNTER
Spoke to patient regarding tac level = 9.5, above goal.  Patient confirms current dose and good 12 hour trough level.  Patient verbalizes understanding of medication dose decrease to 2 mg BID.

## 2017-12-04 NOTE — TELEPHONE ENCOUNTER
Issue Prograf tacrolimus level 9.5 slightly above goal level   Called Rosibel Willingham   Confirmed taking Prograf tacrolimus 2.5 mg twice per day   Confirmed 12 hour trough level   Lowered Prograf tacrolimus to 2.0 mg twice per day    Repeat transplant labs in 2 weeks with Prograf tacrolimus level

## 2017-12-30 ENCOUNTER — EXTERNAL HOSPITAL ADMISSION (OUTPATIENT)
Dept: TRANSPLANT | Facility: CLINIC | Age: 42
End: 2017-12-30

## 2017-12-30 DIAGNOSIS — Z48.298 AFTERCARE FOLLOWING ORGAN TRANSPLANT: ICD-10-CM

## 2017-12-30 DIAGNOSIS — Z94.0 KIDNEY REPLACED BY TRANSPLANT: ICD-10-CM

## 2017-12-30 LAB
ERYTHROCYTE [DISTWIDTH] IN BLOOD BY AUTOMATED COUNT: 14.8 % (ref 11.2–14.4)
HEMATOCRIT (EXTERNAL): 37.3 % (ref 34.9–44.1)
HEMOGLOBIN (EXTERNAL): 12.5 GM/DL (ref 11.6–15.4)
MCH RBC QN AUTO: 27.5 PG (ref 26.9–33.7)
MCHC RBC AUTO-ENTMCNC: 33.6 G/DL (ref 33.3–35.3)
MCV RBC AUTO: 81.7 FL (ref 79.2–97.2)
PLATELET COUNT (EXTERNAL): 237 K/MM3 (ref 156–352)
RBC # BLD AUTO: 4.57 M/MM3 (ref 3.78–5.1)
WBC COUNT (EXTERNAL): 4.2 K/MM3 (ref 3.7–10.1)

## 2018-01-07 ENCOUNTER — HEALTH MAINTENANCE LETTER (OUTPATIENT)
Age: 43
End: 2018-01-07

## 2018-01-24 ENCOUNTER — TELEPHONE (OUTPATIENT)
Dept: TRANSPLANT | Facility: CLINIC | Age: 43
End: 2018-01-24

## 2018-01-24 NOTE — TELEPHONE ENCOUNTER
Issue overdue for surveillance  Donor Specific Antibodies  Monitoring    Plan   Please call and review the need for Donor Specific Antibodies      Send order Donor Specific Antibodies  And Donor Specific Antibodies Kit to be drawn at the next lab draw       2nd   Issue Prograf tacrolimus level 5.9 goal level 6 to 8   Please confirm current dose and confirm 12 hour trough level   No changes at this time   With  Prograf tacrolimus does  Plan to repeat all transplant  Labs with Donor Specific Antibodies  In the 2 weeks

## 2018-01-24 NOTE — TELEPHONE ENCOUNTER
Left message for patient regarding tac level = 5.9, below goal.  Instructed patient return call and confirm current dose and good 12 hour trough.  Also reminded patient to have DSA labs completed at the next txp lab draw.  Mailed ALA mailers to patients home address.

## 2018-01-25 NOTE — TELEPHONE ENCOUNTER
Left message for patient regarding tac level = 5.9, below goal.  Instructed patient return call and confirm current dose and good 12 hour trough.  Also reminded patient to have DSA labs completed at the next txp lab draw.  Mailed [facility] mailers to patients home address

## 2018-01-25 NOTE — TELEPHONE ENCOUNTER
Patient returned call and confirmed good 12 hour trough and current dose.  Patient states that she will bring ALA mailers with her at her next lab draw.

## 2018-02-07 NOTE — TELEPHONE ENCOUNTER
Issue WBC 2.0  Slightly lower   Called LVM   DUE for surveillance  BK Donor Specific Antibodies  With the next set of transplant  Labs   Plan -  Check CMV PCR QT ,EBV PCR QT MPA level and urine protein   Please review dose of immunosuppression medications/ Confirm stopped Valcyte  TMP sulfa     Please confirm no fevers,fatigue and viral symptoms   
Left message for patient regarding WBC = 2.0.  Instructed patient return call and confirm dose changes, current health update and review med list.  
Left message for patient regarding WBC = 2.0.  Instructed patient return call and confirm dose changes, current health update and review med list.  
Updated current lab orders and faxed to [facility] lab.  Updated current medication list.   
- - -

## 2018-02-09 ENCOUNTER — TELEPHONE (OUTPATIENT)
Dept: TRANSPLANT | Facility: CLINIC | Age: 43
End: 2018-02-09

## 2018-02-09 NOTE — TELEPHONE ENCOUNTER
Please call ask her to repeat Donor Specific Antibodies  When the temp is not below 0  In [place]   March 2018     Patsy Araiza, Chinyere GARRIDO RN        Cc: CONSUELO LEE Immunology HLA Lab Class                     [facility] received a sample for PRA testing dated 02/09/18. The sample arrived grossly hemolyzed and will need to be redrawn. The hemolysis is likely due to freezing in transit. If possible, have the drawing lab spin the sample and send us the serum. This will prevent the problem happening again.   Thanks!   Patsy RIZO

## 2018-02-12 NOTE — TELEPHONE ENCOUNTER
Left message for patient regarding Immunology received a sample for PRA testing dated 02/09/18. The sample arrived grossly hemolyzed and will need to be redrawn. The hemolysis is likely due to freezing in transit. If possible, have the drawing lab spin the sample and send us the serum. This will prevent the problem happening again.  Encouraged patient have labs completed again in 3/2018.

## 2018-02-26 ENCOUNTER — TELEPHONE (OUTPATIENT)
Dept: TRANSPLANT | Facility: CLINIC | Age: 43
End: 2018-02-26

## 2018-02-26 NOTE — TELEPHONE ENCOUNTER
Patient called with questions regarding when should she set up her next f/u appt.?  Patient needs note for dental office, donor packet info needed.  Patient states that she had labs completed 2/1/18 and 2/22/18.  Will send admin team a message to request lab results.

## 2018-03-02 NOTE — TELEPHONE ENCOUNTER
Ruby, MD Chacha Obando Mary K, RN                     Yes please in elyssa            Previous Messages       ----- Message -----      From: Chinyere Burnham, RN      Sent: 2/28/2018   9:52 AM        To: Benjamin Beltran MD   Subject: Looking for a double book on June 11 ,2018  *     From: Lilli Willingham [[email or web address]]   Sent: Tuesday, February 20, 2018 4:46 PM   To: Chinyere Burnham   Subject: Next appointment?     Hi Chinyere

## 2018-03-18 NOTE — TELEPHONE ENCOUNTER
Chniyere Burnham RN Huepfel, Mary K, RN                       Previous Messages       ----- Message -----      From: Benjamin Beltran MD      Sent: 2/28/2018   3:09 PM        To: Chinyere Burnham RN   Subject: RE: Looking for a double book on June 11 ,20*     Yes please in june   ----- Message -----      From: Chinyere Burnham RN      Sent: 2/28/2018   9:52 AM        To: Benjamin Beltran MD   Subject: Looking for a double book on June 11 ,2018  *     From: Lilli Willingham [[email or web address]   Sent: Tuesday, February 20, 2018 4:46 PM   To: Chinyere Burnham   Subject: Next appointment?     Leon Whitlock,   I hope you're doing well.   I was just wondering when I should come back to [place] again.   My one year anniversary is 4/23 - wohoo!   My last visit at the [facility] was 7/3.     Someone had said something when I was there last about my insurance covering me as long as I'm seen within a year.   Is that 1 year from the transplant or 1 year since I was last seen in [place]?     I don't want to be denied claims because I didn't come in April.  Boy, that would be spendy, ha ha.   I see my nephrologist regularly here.  My next appointment is next week on 2/27.     I'll be in [place] June 7th-12th so I can be available for an appointment 6/8 or 6/11 if that works for anything.

## 2018-03-23 ENCOUNTER — TELEPHONE (OUTPATIENT)
Dept: TRANSPLANT | Facility: CLINIC | Age: 43
End: 2018-03-23

## 2018-03-23 NOTE — TELEPHONE ENCOUNTER
Overdue for surveillance  Donor Specific Antibodies    Please mail Donor Specific Antibodies  And Kit   Please call Rosibel Willingham to have drawn with next lab draw this month

## 2018-03-23 NOTE — LETTER
OUTPATIENT LABORATORY TEST ORDER    Patient Name:Rosibel Willingham   Transplant Date: 4/23/2017  YOB: 1975  Issue Date & Time: 3/23/2018  2:23 PM     [facility] MR: [id number]  Expiration Date:  (1 year after date issued)        Diagnoses: Aftercare following organ transplant (ICD-10 Z48.288)   Kidney Transplant (ICD-10  Z94.0)   Long term use of medications (ICD-10  Z79.899)     ?Lab results to be available on the same day drawn.   ?Patient should release information to the [facility], [facility].     ?Please fax to the Transplant Center at [phone].      Every 3 weeks, Months 10-12 post-transplant   1/27/2018 - 4/23/2018       ?Hemogram and Platelet   ?Basic Metabolic Panel (Sodium, Potassium,Chloride, CO2, Creatinine, Urea Nitrogen, Glucose, Calcium)   ?Prograf/Tacrolimus drug level     Monthly     ? BK PCR Quantitative (Polyoma virus - blood in purple top tube to Reference Lab)    At 12 months post-transplant        Due: 4/2018   ? HBsurfaceAb, HBcoreAb, HCV at 6 months only -   ? Liver Function Tests(Bilirubin Direct/Total, AST, ALT, Alkaline Phosphatase)   ?Complete Lipid Panel fasting (Cholesterol, Triglycerides, HDL, LDL)   ? Random Urine for Protein/Creatinineratio                  ? PRA/DSA level (mailers provided by the patient)                     If you have any questions, please call The [facility] at [phone] or [phone].    Please faxall results to [phone].  .

## 2018-03-23 NOTE — TELEPHONE ENCOUNTER
Left message for patient stating that she should have DSA drawn at the next routine transplant lab draw.  ALA mailers kit sent to patients home address.  Updated current standing lab orders faxed to patients lab and mailed to patient.

## 2018-04-19 ENCOUNTER — TELEPHONE (OUTPATIENT)
Dept: TRANSPLANT | Facility: CLINIC | Age: 43
End: 2018-04-19

## 2018-04-19 NOTE — LETTER
[facility]  [address]  [facility],  [address]  [address]  Tel [phone]  Toll Free [phone]                OUTPATIENT LABORATORY TEST ORDER    Patient Name: Rosibel Willingham  Transplant Date: 4/23/2017 (Kidney)  YOB: 1975                                                        Issue Date & Time:April 26, 2018  8:14 AM    [facility] MR:  [id number]  Exp. Date (1 year after date issued)    Diagnoses: Kidney Transplant (ICD-10  Z94.0)   Long term use of medications (ICD-10  Z79.899)     Lab results to be available on the same day drawn.   Patient should release information to the [facility], [facility].  Please fax to the Transplant Center at [phone].    Monthly   ?Hemogram and Platelet  ?Basic Metabolic Panel (Sodium, Potassium, Chloride, CO2, Creatinine, Urea Nitrogen, Glucose, Calcium)  ?/Tacrolimus/Prograf drug level     Every 6 Months                 ?BK (Polyoma Virus) PCR Quantitative - Plasma                                           ?Urine for protein/creatinine    Yearly:   ? PRA/DSA level (mailers provided by the patient)     If you have any questions, please call [facility] at [phone] or [phone].    Please fax labs to [phone]    Benjamin Beltran

## 2018-04-20 NOTE — TELEPHONE ENCOUNTER
OVER DUE FOR SURVEILLANCE  Donor Specific Antibodies   -   Please call Rosibel Willingham /send kit and mail order  To obtain Donor Specific Antibodies    Multi attempts to obtain Donor Specific Antibodies

## 2018-04-23 ENCOUNTER — RESULTS ONLY (OUTPATIENT)
Dept: OTHER | Facility: CLINIC | Age: 43
End: 2018-04-23

## 2018-04-23 DIAGNOSIS — Z94.0 KIDNEY REPLACED BY TRANSPLANT: ICD-10-CM

## 2018-04-23 DIAGNOSIS — Z48.298 AFTERCARE FOLLOWING ORGAN TRANSPLANT: ICD-10-CM

## 2018-04-23 PROCEDURE — 86832 HLA CLASS I HIGH DEFIN QUAL: CPT | Performed by: TRANSPLANT SURGERY

## 2018-04-23 PROCEDURE — 86833 HLA CLASS II HIGH DEFIN QUAL: CPT | Performed by: TRANSPLANT SURGERY

## 2018-04-26 NOTE — TELEPHONE ENCOUNTER
Updated current standing lab orders faxed to patients lab and mailed to patient along with DSA  kit.

## 2018-05-01 LAB — PRA DONOR SPECIFIC ABY: NORMAL

## 2018-05-06 ENCOUNTER — TELEPHONE (OUTPATIENT)
Dept: TRANSPLANT | Facility: CLINIC | Age: 43
End: 2018-05-06

## 2018-05-06 NOTE — TELEPHONE ENCOUNTER
1 year Kidney and Pancreas Transplant Patient Phone Call    Congratulations on your 1 year post-transplant anniversary!  Please call Rosibel Willingham - Angie phone call    Please re-review with the patient how to contact the Transplant Center, as their coordination team will now change:  Best phone contact is [phone], as if the need is urgent, any care coordinator will be able to assist you.    When the patient should contact the Transplant Center:    If you run out of your immunosuppression medications.     Prolonged illness that is not resolved after recommendations of the PCP, such as frequent UTI.    Viral infections such as : Shingles (herpes-zoster), CMV, EBV, and Influenza    Cancer    Increased creatinine or pain over your graft site.        Hospitalizations at facilities other than [facility].    When the patient should visit their local ED or Urgent Care:    Severe dehydration (uncontrolled nausea, vomiting, diarrhea).    Unable to urinate.    Fevers >100.5.    Other medical emergencies.      Medications:  Please complete medication reconciliation and ensure the patient has an up to date medication card at home.   *ALWAYS BRING YOUR MED CARD TO APPOINTMENTS.*  Please confirm if the patient is independent with medication set up and administration:      Please review if the patient has had any incidents of missed medications: {YES /NO:1289      Labs:  Please review current lab frequency with the patient after 1 year post transplant:  Labs will now be drawn monthly up to 3 years post-transplant.   Contact the Transplant Center if additional lab orders are needed.  Please update and mail lab letter and orders.    Your coordinator is currently monitoring your most recent transplant complications:      General Transplant Recommendations:    Yearly nephrology visits with our MDs.    yearly follow up with your primary care provider (PCP)     Follow up with specialty providers as directed.    Frequent reviews of the  transplant handbook and / or [facility].    Lab review on MyChart.   If the patient does not utilize MyChart, please record how the patient monitors their lab values:      Plan   Please mail and fax new lab orders  Please    - overdue for Donor Specific Antibodies

## 2018-05-14 ENCOUNTER — TELEPHONE (OUTPATIENT)
Dept: NEPHROLOGY | Facility: CLINIC | Age: 43
End: 2018-05-14

## 2018-05-14 NOTE — TELEPHONE ENCOUNTER
Left voicemail for patient to call back (follow up from last transplant appointment).    Julia Flores RN

## 2018-06-04 DIAGNOSIS — D84.9 IMMUNOSUPPRESSION (H): ICD-10-CM

## 2018-06-04 DIAGNOSIS — Z94.0 KIDNEY REPLACED BY TRANSPLANT: Primary | ICD-10-CM

## 2018-06-04 DIAGNOSIS — E55.9 VITAMIN D DEFICIENCY: ICD-10-CM

## 2018-06-04 DIAGNOSIS — R79.89 ELEVATED SERUM CREATININE: ICD-10-CM

## 2018-06-04 DIAGNOSIS — E87.20 ACIDOSIS: ICD-10-CM

## 2018-06-04 DIAGNOSIS — E83.42 HYPOMAGNESEMIA: ICD-10-CM

## 2018-06-04 DIAGNOSIS — N18.6 END STAGE RENAL DISEASE (H): ICD-10-CM

## 2018-06-04 RX ORDER — MYCOPHENOLIC ACID 180 MG/1
540 TABLET, DELAYED RELEASE ORAL 2 TIMES DAILY
Qty: 180 TABLET | Refills: 11 | Status: SHIPPED | OUTPATIENT
Start: 2018-06-04 | End: 2018-06-04

## 2018-06-04 RX ORDER — MYCOPHENOLIC ACID 180 MG/1
540 TABLET, DELAYED RELEASE ORAL 2 TIMES DAILY
Qty: 180 TABLET | Refills: 11 | Status: SHIPPED | OUTPATIENT
Start: 2018-06-04 | End: 2019-07-02

## 2018-06-11 ENCOUNTER — OFFICE VISIT (OUTPATIENT)
Dept: NEPHROLOGY | Facility: CLINIC | Age: 43
End: 2018-06-11
Attending: INTERNAL MEDICINE
Payer: COMMERCIAL

## 2018-06-11 ENCOUNTER — RECORDS - HEALTHEAST (OUTPATIENT)
Dept: ADMINISTRATIVE | Facility: OTHER | Age: 43
End: 2018-06-11

## 2018-06-11 VITALS
HEART RATE: 84 BPM | DIASTOLIC BLOOD PRESSURE: 80 MMHG | SYSTOLIC BLOOD PRESSURE: 134 MMHG | HEIGHT: 62 IN | RESPIRATION RATE: 16 BRPM | BODY MASS INDEX: 21.75 KG/M2 | WEIGHT: 118.2 LBS

## 2018-06-11 DIAGNOSIS — D84.9 IMMUNOSUPPRESSED STATUS (H): ICD-10-CM

## 2018-06-11 DIAGNOSIS — Z94.0 KIDNEY REPLACED BY TRANSPLANT: ICD-10-CM

## 2018-06-11 DIAGNOSIS — R80.1 PERSISTENT PROTEINURIA: ICD-10-CM

## 2018-06-11 DIAGNOSIS — I15.1 HYPERTENSION SECONDARY TO OTHER RENAL DISORDERS: ICD-10-CM

## 2018-06-11 DIAGNOSIS — E55.9 VITAMIN D DEFICIENCY: ICD-10-CM

## 2018-06-11 DIAGNOSIS — Z94.0 KIDNEY REPLACED BY TRANSPLANT: Primary | ICD-10-CM

## 2018-06-11 DIAGNOSIS — R79.89 ELEVATED SERUM CREATININE: ICD-10-CM

## 2018-06-11 LAB
ALBUMIN SERPL-MCNC: 4.3 G/DL (ref 3.4–5)
ANION GAP SERPL CALCULATED.3IONS-SCNC: 9 MMOL/L (ref 3–14)
BUN SERPL-MCNC: 22 MG/DL (ref 7–30)
CALCIUM SERPL-MCNC: 9.4 MG/DL (ref 8.5–10.1)
CHLORIDE SERPL-SCNC: 108 MMOL/L (ref 94–109)
CO2 SERPL-SCNC: 23 MMOL/L (ref 20–32)
CREAT SERPL-MCNC: 1.31 MG/DL (ref 0.52–1.04)
CREAT UR-MCNC: 96 MG/DL
DEPRECATED CALCIDIOL+CALCIFEROL SERPL-MC: 57 UG/L (ref 20–75)
FERRITIN SERPL-MCNC: 9 NG/ML (ref 12–150)
GFR SERPL CREATININE-BSD FRML MDRD: 44 ML/MIN/1.7M2
GLUCOSE SERPL-MCNC: 95 MG/DL (ref 70–99)
IRON SATN MFR SERPL: 17 % (ref 15–46)
IRON SERPL-MCNC: 62 UG/DL (ref 35–180)
PHOSPHATE SERPL-MCNC: 2.1 MG/DL (ref 2.5–4.5)
POTASSIUM SERPL-SCNC: 4.1 MMOL/L (ref 3.4–5.3)
PROT UR-MCNC: 0.73 G/L
PROT/CREAT 24H UR: 0.76 G/G CR (ref 0–0.2)
PTH-INTACT SERPL-MCNC: 96 PG/ML (ref 18–80)
SODIUM SERPL-SCNC: 140 MMOL/L (ref 133–144)
TIBC SERPL-MCNC: 373 UG/DL (ref 240–430)

## 2018-06-11 PROCEDURE — 83970 ASSAY OF PARATHORMONE: CPT | Performed by: INTERNAL MEDICINE

## 2018-06-11 PROCEDURE — 84156 ASSAY OF PROTEIN URINE: CPT | Performed by: INTERNAL MEDICINE

## 2018-06-11 PROCEDURE — 80069 RENAL FUNCTION PANEL: CPT | Performed by: INTERNAL MEDICINE

## 2018-06-11 PROCEDURE — 83540 ASSAY OF IRON: CPT | Performed by: INTERNAL MEDICINE

## 2018-06-11 PROCEDURE — 82306 VITAMIN D 25 HYDROXY: CPT | Performed by: INTERNAL MEDICINE

## 2018-06-11 PROCEDURE — G0463 HOSPITAL OUTPT CLINIC VISIT: HCPCS | Mod: ZF

## 2018-06-11 PROCEDURE — 82728 ASSAY OF FERRITIN: CPT | Performed by: INTERNAL MEDICINE

## 2018-06-11 PROCEDURE — 36415 COLL VENOUS BLD VENIPUNCTURE: CPT | Performed by: INTERNAL MEDICINE

## 2018-06-11 PROCEDURE — 83550 IRON BINDING TEST: CPT | Performed by: INTERNAL MEDICINE

## 2018-06-11 ASSESSMENT — PAIN SCALES - GENERAL: PAINLEVEL: NO PAIN (0)

## 2018-06-11 NOTE — MR AVS SNAPSHOT
"              After Visit Summary   6/11/2018    Rosibel Willingham    MRN: [id number]           Patient Information     Date Of Birth          1975        Visit Information        Provider Department      6/11/2018 1:30 PM Jose Luis Ramirez MD [facility] Nephrology        Today's Diagnoses     Kidney replaced by transplant    -  1    Immunosuppressed status (H)        Hypertension secondary to other renal disorders        Vitamin D deficiency        Persistent proteinuria           Follow-ups after your visit        Who to contact     If you have questions or need follow up information about today's clinic visit or your schedule please contact [facility]PHROLOG[facility] directly at [phone].  Normal or non-critical lab and imaging results will be communicated to you by MyChart, letter or phone within 4 business days after the clinic has received the results. If you do not hear from us within 7 days, please contact the clinic through [employer] or phone. If you have a critical or abnormal lab result, we will notify you by phone as soon as possible.  Submit refill requests through [employer] or call your pharmacy and they will forward the refill request to us. Please allow 3 business days for your refill to be completed.          Additional Information About Your Visit        MyChart Information     [employer] gives you secure access to your electronic health record. If you see a primary care provider, you can also send messages to your care team and make appointments. If you have questions, please call your primary care clinic.  If you do not have a primary care provider, please call [phone] and they will assist you.        Care EveryWhere ID     This is your Care EveryWhere ID. This could be used by other organizations to access your [facility] medical records  [id number]        Your Vitals Were     Pulse Respirations Height BMI (Body Mass Index)          84 16 1.575 m (5' 2\") 21.62 kg/m2         Blood Pressure from Last " 3 Encounters:   06/11/18 134/80   07/03/17 129/78   05/18/17 122/75    Weight from Last 3 Encounters:   06/11/18 53.6 kg (118 lb 3.2 oz)   07/03/17 48.1 kg (106 lb)   05/16/17 48.5 kg (107 lb)              Today, you had the following     No orders found for display         Today's Medication Changes          These changes are accurate as of 6/11/18  4:51 PM.  If you have any questions, ask your nurse or doctor.               Stop taking these medicines if you haven't already. Please contact your care team if you have questions.     sulfamethoxazole-trimethoprim 400-80 MG per tablet   Commonly known as:  BACTRIM   Stopped by:  Jose Luis Ramirez MD           valGANciclovir 450 MG tablet   Commonly known as:  VALCYTE   Stopped by:  Jose Luis Ramirez MD                    Primary Care Provider Fax #    Physician No Ref-Primary [phone]       No address on file        Equal Access to Services     [facility] : Hadavinash cheeko Solio, waaxda luqadaha, qaybta kaalmada adegraceyaboyd, vasyl nieves . So [facility] [phone].    ATENCIÓN: Si habla español, tiene a perea disposición servicios gratuitos de asistencia lingüística. Llame al [phone].    We comply with applicable federal civil rights laws and [place] laws. We do not discriminate on the basis of race, color, national origin, age, disability, sex, sexual orientation, or gender identity.            Thank you!     Thank you for choosing [facility] NEPHROLOGY  for your care. Our goal is always to provide you with excellent care. Hearing back from our patients is one way we can continue to improve our services. Please take a few minutes to complete the written survey that you may receive in the mail after your visit with us. Thank you!             Your Updated Medication List - Protect others around you: Learn how to safely use, store and throw away your medicines at www.disposemymeds.org.          This list is accurate as of  6/11/18  4:51 PM.  Always use your most recent med list.                   Brand Name Dispense Instructions for use Diagnosis    acetaminophen 325 MG tablet    TYLENOL    100 tablet    Take 2 tablets (650 mg) by mouth every 4 hours as needed for mild pain or fever    Kidney replaced by transplant       amLODIPine 5 MG tablet    NORVASC    60 tablet    Take 2 tablets (10 mg) by mouth daily    End stage renal disease (H), Vitamin D deficiency, Hypomagnesemia, Kidney replaced by transplant, Acidosis, Immunosuppression (H)       ASPIRIN PO      Take 325 mg by mouth daily        cholecalciferol 1000 units capsule    vitamin  -D    30 capsule    Take 1 capsule (1,000 Units) by mouth daily    Vitamin D deficiency, End stage renal disease (H), Hypomagnesemia, Kidney replaced by transplant, Acidosis, Immunosuppression (H)       mycophenolic acid 180 MG EC tablet    GENERIC EQUIVALENT    180 tablet    Take 3 tablets (540 mg) by mouth 2 times daily    Kidney replaced by transplant, End stage renal disease (H), Vitamin D deficiency, Hypomagnesemia, Acidosis, Immunosuppression (H)       ondansetron 4 MG tablet    ZOFRAN    30 tablet    Take 1 tablet (4 mg) by mouth every 8 hours as needed for nausea    Kidney replaced by transplant       * tacrolimus 1 MG capsule    GENERIC EQUIVALENT    120 capsule    Take 2 capsules (2 mg) by mouth 2 times daily    Kidney replaced by transplant, Immunosuppression (H), End stage renal disease (H), Vitamin D deficiency, Hypomagnesemia, Acidosis       * tacrolimus 0.5 MG capsule    GENERIC EQUIVALENT    60 capsule    HOLD    Kidney replaced by transplant, End stage renal disease (H), Vitamin D deficiency, Hypomagnesemia, Acidosis, Immunosuppression (H)       * Notice:  This list has 2 medication(s) that are the same as other medications prescribed for you. Read the directions carefully, and ask your doctor or other care provider to review them with you.

## 2018-06-11 NOTE — NURSING NOTE
"Chief Complaint   Patient presents with     RECHECK     Kidney tx follow up     /80  Pulse 84  Resp 16  Ht 1.575 m (5' 2\")  Wt 53.6 kg (118 lb 3.2 oz)  BMI 21.62 kg/m2    ADA CORDOVA CMA    "

## 2018-06-11 NOTE — PROGRESS NOTES
Assessment and Plan:  1. DDKT - ESRD due to unknown cause s/p DD kid tx on 17. Currently on immunosuppression with:    Tac 2 mg po bid - last trough: 9.4 with goal 4-6 ng / ml. Decrease young to 1.5 mg po bid  mpa 540 mg po bid    UPC: 0.76 g / g on 18  DSA: negative on 18  BK: BK negative on 18    Creatinine baseline is 1.3 mg / dl.   2. Immunosuppression: see above.   3. Htn: on amlo. Goal bp 100-130/60-80.  4. Hypovitaminosis D: jairo.   5.  Dental screening: The patient is cleared to proceed with routine dental care.  There is no antibiotic prophylaxis that is necessary for routine dental cleanings.  However, if this patient were to need invasive dental work such as a root canal or tooth extraction I would recommend prophylaxis.  Given her allergies with erythromycin I would recommend amoxicillin 2 g ×1 dose prior to invasive dental work.  6. Proteinuria: stable at 0.7 g / g. If bp drifts up, would recommend losartan 25 mg po daily.      Assessment and plan was discussed with patient and she voiced her understanding and agreement.    Reason for Visit:  Ms. Willingham is here for routine follow up and kidney transplant.    HPI:   Rosibel Willingham is a 42 year old female with ESKD from unknown cause and is status post DDKT on 17.         Transplant Hx:       Tx: DDKT  Date: 17       Present Maintenance IS: Tacrolimus and Mycophenolic acid       Baseline Creatinine: 1.3 mg / dl       Recent DSA: No         Biopsy: Yes: 17    FINAL DIAGNOSIS:   Kidney, allograft; percutaneous needle biopsy:   -Mild acute tubular injury   -One glomerulus with segmental sclerosis (see comment)   -No diagnostic evidence of acute rejection seen   -Mild arterio- and moderate to severe arteriolosclerosis    The patient is a 42-year-old female with history of end-stage kidney disease of unclear etiology who is status post  donor kidney transplant of 2017 here for follow-up of kidney transplant.   Next    The patient is on chronic  immunosuppression with tacrolimus and Myfortic.    She currently feels well.  She specifically denies any chest pain or shortness of breath.  She has no nausea or vomiting.  She has no fever shakes or chills.  She has no other complaints today.    We did discuss her blood pressure which today is 134/80.  This is acceptable blood pressure for her given her low risk of coronary artery disease at this time.    She is due for dental screening.    Home BP: at goal.      ROS:   A comprehensive review of systems was obtained and negative, except as noted in the HPI or PMH.    Active Medical Problems:  Patient Active Problem List   Diagnosis     Anemia in chronic renal disease     Secondary renal hyperparathyroidism (H)     Hypertension secondary to other renal disorders     Kidney replaced by transplant     Immunosuppressed status (H)     Vitamin D deficiency     Metabolic acidosis     Hypomagnesemia     Dehydration     Personal Hx:  Social History     Social History     Marital status:      Spouse name: N/A     Number of children: N/A     Years of education: N/A     Occupational History     Not on file.     Social History Main Topics     Smoking status: Former Smoker     Packs/day: 0.50     Years: 2.00     Types: Cigarettes     Quit date: 9/26/1998     Smokeless tobacco: Never Used     Alcohol use 1.2 oz/week     2 Standard drinks or equivalent per week     Drug use: No     Sexual activity: Not on file     Other Topics Concern     Not on file     Social History Narrative     Allergies:  Allergies   Allergen Reactions     Erythromycin Hives     Medications:  Prior to Admission medications    Medication Sig Start Date End Date Taking? Authorizing Provider   amLODIPine (NORVASC) 5 MG tablet Take 2 tablets (10 mg) by mouth daily 5/22/17  Yes Benjamin Beltran MD   ASPIRIN PO Take 325 mg by mouth daily   Yes Reported, Patient   cholecalciferol (VITAMIN  -D) 1000 UNITS capsule Take 1  "capsule (1,000 Units) by mouth daily 5/22/17  Yes Benjamin Beltran MD   mycophenolic acid (GENERIC EQUIVALENT) 180 MG EC tablet Take 3 tablets (540 mg) by mouth 2 times daily 6/4/18  Yes Benjamin Beltran MD   ondansetron (ZOFRAN) 4 MG tablet Take 1 tablet (4 mg) by mouth every 8 hours as needed for nausea 6/15/17  Yes Benjamin Beltran MD   tacrolimus (GENERIC EQUIVALENT) 1 MG capsule Take 2 capsules (2 mg) by mouth 2 times daily 12/4/17  Yes Benjamin Beltran MD   acetaminophen (TYLENOL) 325 MG tablet Take 2 tablets (650 mg) by mouth every 4 hours as needed for mild pain or fever  Patient not taking: Reported on 6/11/2018 4/27/17   Tawny Macias PA-C   tacrolimus (GENERIC EQUIVALENT) 0.5 MG capsule HOLD  Patient not taking: Reported on 6/11/2018 12/4/17   Benjamin Beltran MD     Vitals:  /80  Pulse 84  Resp 16  Ht 1.575 m (5' 2\")  Wt 53.6 kg (118 lb 3.2 oz)  BMI 21.62 kg/m2    Exam:   GENERAL APPEARANCE: alert and no distress  HENT: mouth without ulcers or lesions  LYMPHATICS: no cervical or supraclavicular nodes  RESP: lungs clear to auscultation - no rales, rhonchi or wheezes  CV: regular rhythm, normal rate, no rub, no murmur  EDEMA: no LE edema bilaterally  ABDOMEN: soft, nondistended, nontender, bowel sounds normal  MS: extremities normal - no gross deformities noted, no evidence of inflammation in joints, no muscle tenderness  SKIN: no rash    Results:   Labs reviewed with patient.     "

## 2018-06-11 NOTE — LETTER
6/11/2018      RE: Rosibel Willingham  [address] [facility] [address]       Assessment and Plan:  1. DDKT - ESRD due to unknown cause s/p DD kid tx on 4/23/17. Currently on immunosuppression with:    Tac 2 mg po bid - last trough: 9.4 with goal 4-6 ng / ml. Decrease young to 1.5 mg po bid  mpa 540 mg po bid    UPC: 0.76 g / g on 6/11/18  DSA: negative on 4/23/18  BK: BK negative on 4/23/18    Creatinine baseline is 1.3 mg / dl.   2. Immunosuppression: see above.   3. Htn: on amlo. Goal bp 100-130/60-80.  4. Hypovitaminosis D: jairo.   5.  Dental screening: The patient is cleared to proceed with routine dental care.  There is no antibiotic prophylaxis that is necessary for routine dental cleanings.  However, if this patient were to need invasive dental work such as a root canal or tooth extraction I would recommend prophylaxis.  Given her allergies with erythromycin I would recommend amoxicillin 2 g ×1 dose prior to invasive dental work.  6. Proteinuria: stable at 0.7 g / g. If bp drifts up, would recommend losartan 25 mg po daily.      Assessment and plan was discussed with patient and she voiced her understanding and agreement.    Reason for Visit:  Ms. Willingham is here for routine follow up and kidney transplant.    HPI:   Rosibel Willingham is a 42 year old female with ESKD from unknown cause and is status post DDKT on 4/23/17.         Transplant Hx:       Tx: DDKT  Date: 4/23/17       Present Maintenance IS: Tacrolimus and Mycophenolic acid       Baseline Creatinine: 1.3 mg / dl       Recent DSA: No         Biopsy: Yes: 5/17/17    FINAL DIAGNOSIS:   Kidney, allograft; percutaneous needle biopsy:   -Mild acute tubular injury   -One glomerulus with segmental sclerosis (see comment)   -No diagnostic evidence of acute rejection seen   -Mild arterio- and moderate to severe arteriolosclerosis    The patient is a 42-year-old female with history of end-stage kidney disease of unclear etiology who is status post   donor kidney transplant of 2017 here for follow-up of kidney transplant.  Next    The patient is on chronic  immunosuppression with tacrolimus and Myfortic.    She currently feels well.  She specifically denies any chest pain or shortness of breath.  She has no nausea or vomiting.  She has no fever shakes or chills.  She has no other complaints today.    We did discuss her blood pressure which today is 134/80.  This is acceptable blood pressure for her given her low risk of coronary artery disease at this time.    She is due for dental screening.    Home BP: at goal.      ROS:   A comprehensive review of systems was obtained and negative, except as noted in the HPI or PMH.    Active Medical Problems:  Patient Active Problem List   Diagnosis     Anemia in chronic renal disease     Secondary renal hyperparathyroidism (H)     Hypertension secondary to other renal disorders     Kidney replaced by transplant     Immunosuppressed status (H)     Vitamin D deficiency     Metabolic acidosis     Hypomagnesemia     Dehydration     Personal Hx:  Social History     Social History     Marital status:      Spouse name: N/A     Number of children: N/A     Years of education: N/A     Occupational History     Not on file.     Social History Main Topics     Smoking status: Former Smoker     Packs/day: 0.50     Years: 2.00     Types: Cigarettes     Quit date: 1998     Smokeless tobacco: Never Used     Alcohol use 1.2 oz/week     2 Standard drinks or equivalent per week     Drug use: No     Sexual activity: Not on file     Other Topics Concern     Not on file     Social History Narrative     Allergies:  Allergies   Allergen Reactions     Erythromycin Hives     Medications:  Prior to Admission medications    Medication Sig Start Date End Date Taking? Authorizing Provider   amLODIPine (NORVASC) 5 MG tablet Take 2 tablets (10 mg) by mouth daily 17  Yes Benjamin Beltran MD   ASPIRIN PO Take 325 mg by mouth  "daily   Yes Reported, Patient   cholecalciferol (VITAMIN  -D) 1000 UNITS capsule Take 1 capsule (1,000 Units) by mouth daily 5/22/17  Yes Benjamin Beltran MD   mycophenolic acid (GENERIC EQUIVALENT) 180 MG EC tablet Take 3 tablets (540 mg) by mouth 2 times daily 6/4/18  Yes Benjamin Beltran MD   ondansetron (ZOFRAN) 4 MG tablet Take 1 tablet (4 mg) by mouth every 8 hours as needed for nausea 6/15/17  Yes Benjamin Beltran MD   tacrolimus (GENERIC EQUIVALENT) 1 MG capsule Take 2 capsules (2 mg) by mouth 2 times daily 12/4/17  Yes Benjamin Beltran MD   acetaminophen (TYLENOL) 325 MG tablet Take 2 tablets (650 mg) by mouth every 4 hours as needed for mild pain or fever  Patient not taking: Reported on 6/11/2018 4/27/17   Tawny Macisa PA-C   tacrolimus (GENERIC EQUIVALENT) 0.5 MG capsule HOLD  Patient not taking: Reported on 6/11/2018 12/4/17   Benjamin Beltran MD     Vitals:  /80  Pulse 84  Resp 16  Ht 1.575 m (5' 2\")  Wt 53.6 kg (118 lb 3.2 oz)  BMI 21.62 kg/m2    Exam:   GENERAL APPEARANCE: alert and no distress  HENT: mouth without ulcers or lesions  LYMPHATICS: no cervical or supraclavicular nodes  RESP: lungs clear to auscultation - no rales, rhonchi or wheezes  CV: regular rhythm, normal rate, no rub, no murmur  EDEMA: no LE edema bilaterally  ABDOMEN: soft, nondistended, nontender, bowel sounds normal  MS: extremities normal - no gross deformities noted, no evidence of inflammation in joints, no muscle tenderness  SKIN: no rash    Results:   Labs reviewed with patient.       Jose Luis Ramirez MD  "

## 2018-06-12 DIAGNOSIS — N18.6 END STAGE RENAL DISEASE (H): ICD-10-CM

## 2018-06-12 DIAGNOSIS — E87.20 ACIDOSIS: ICD-10-CM

## 2018-06-12 DIAGNOSIS — D84.9 IMMUNOSUPPRESSION (H): ICD-10-CM

## 2018-06-12 DIAGNOSIS — E61.1 IRON DEFICIENCY: Primary | ICD-10-CM

## 2018-06-12 DIAGNOSIS — Z94.0 KIDNEY REPLACED BY TRANSPLANT: Primary | ICD-10-CM

## 2018-06-12 DIAGNOSIS — E83.42 HYPOMAGNESEMIA: ICD-10-CM

## 2018-06-12 DIAGNOSIS — E55.9 VITAMIN D DEFICIENCY: ICD-10-CM

## 2018-06-12 RX ORDER — FERROUS SULFATE 325(65) MG
325 TABLET ORAL
Qty: 30 TABLET | Refills: 11 | Status: SHIPPED | OUTPATIENT
Start: 2018-06-12

## 2018-06-12 NOTE — TELEPHONE ENCOUNTER
Call placed to patient. No answer. Voice message left instructing patient to begin ferrous sulfate 325 mg daily for iron deficiency. Rx sent

## 2018-06-12 NOTE — TELEPHONE ENCOUNTER
Message  Received: Yesterday       Jose Luis Ramirez MD Krull, Mary Jane GARRIDO, RN                     Also, can you reduce the tac to 1.5 mg po bid goal 4-6 ng / ml.     Thanks,     d       PLAN/TASK:  Please call pt and ask that she decrease dose to 1.5mg twice daily and repeat all transplant labs next week.   Enter lab order, update rx. Thanks!

## 2018-06-12 NOTE — TELEPHONE ENCOUNTER
Jose Luis Ramirez MD Krull, Leisa K, RN                     Mary Jane:     Please send a copy of my note to her dentist for clearance for routine dental cleaning.     Thanks,     d       TASK:  Please also ask her who her dentist is and send Dr. Ramirez' most recent note.

## 2018-06-12 NOTE — TELEPHONE ENCOUNTER
Notes Recorded by Jose Luis Ramirez MD on 6/12/2018 at 11:33 AM  Goal tac 4-6.    Start oral iron for iron deficiency 325 mg po daily.     d     PLAN:  Start Ferrous sulfate 325 mg daily.

## 2018-06-12 NOTE — TELEPHONE ENCOUNTER
Call placed to patient. No answer. Voice message left instructing patient to decrease her tacrolimus to 1.5 mg twice daily, repeat all txp labs next week and return call to update sot with her dental provider. Order sent. Will try back

## 2018-06-13 RX ORDER — TACROLIMUS 0.5 MG/1
0.5 CAPSULE ORAL 2 TIMES DAILY
Qty: 60 CAPSULE | Refills: 11 | Status: SHIPPED | OUTPATIENT
Start: 2018-06-13 | End: 2019-06-19

## 2018-06-13 RX ORDER — TACROLIMUS 1 MG/1
1 CAPSULE ORAL 2 TIMES DAILY
Qty: 60 CAPSULE | Refills: 11 | Status: SHIPPED | OUTPATIENT
Start: 2018-06-13 | End: 2019-06-19

## 2018-06-13 NOTE — TELEPHONE ENCOUNTER
Rowena Ramirez,   Your office left a voice mail today while I was in-flight in the way back to [place].   I got the message to change my dose of Tacrolimus to 1.5 which I can start tonight and to start taking an iron supplement which I can  tomorrow.   My dentist office is [facility] [phone].   Also, I never received a packet at 6 months post-transplant to send a letter to the donor family. That's something I'd really like to do.   Thank you,   Rosibel Willingham    Info regarding writing donor family sent to patient via mail.  Office visit sent to patients dentist per patient request.

## 2018-10-24 ENCOUNTER — TELEPHONE (OUTPATIENT)
Dept: NEPHROLOGY | Facility: CLINIC | Age: 43
End: 2018-10-24

## 2018-10-24 DIAGNOSIS — Z94.0 KIDNEY REPLACED BY TRANSPLANT: ICD-10-CM

## 2018-10-24 DIAGNOSIS — E83.42 HYPOMAGNESEMIA: ICD-10-CM

## 2018-10-24 DIAGNOSIS — E55.9 VITAMIN D DEFICIENCY: ICD-10-CM

## 2018-10-24 DIAGNOSIS — E87.20 ACIDOSIS: ICD-10-CM

## 2018-10-24 DIAGNOSIS — D84.9 IMMUNOSUPPRESSION (H): ICD-10-CM

## 2018-10-24 DIAGNOSIS — N18.6 END STAGE RENAL DISEASE (H): ICD-10-CM

## 2018-10-24 RX ORDER — AMLODIPINE BESYLATE 5 MG/1
10 TABLET ORAL DAILY
Qty: 60 TABLET | Refills: 3 | Status: SHIPPED | OUTPATIENT
Start: 2018-10-24 | End: 2018-12-11

## 2018-12-11 DIAGNOSIS — N18.6 END STAGE RENAL DISEASE (H): ICD-10-CM

## 2018-12-11 DIAGNOSIS — E83.42 HYPOMAGNESEMIA: ICD-10-CM

## 2018-12-11 DIAGNOSIS — D84.9 IMMUNOSUPPRESSION (H): ICD-10-CM

## 2018-12-11 DIAGNOSIS — Z94.0 KIDNEY REPLACED BY TRANSPLANT: ICD-10-CM

## 2018-12-11 DIAGNOSIS — E55.9 VITAMIN D DEFICIENCY: ICD-10-CM

## 2018-12-11 DIAGNOSIS — E87.20 ACIDOSIS: ICD-10-CM

## 2018-12-11 RX ORDER — AMLODIPINE BESYLATE 5 MG/1
10 TABLET ORAL DAILY
Qty: 180 TABLET | Refills: 3 | Status: SHIPPED | OUTPATIENT
Start: 2018-12-11 | End: 2019-02-02

## 2018-12-11 NOTE — TELEPHONE ENCOUNTER
Last Office Visit with Nephrologist:  6/11/18.  Medication refilled per Nephrology Clinic protocol.     Julia Flores RN

## 2019-04-30 DIAGNOSIS — N18.6 END STAGE RENAL DISEASE (H): ICD-10-CM

## 2019-04-30 DIAGNOSIS — E83.42 HYPOMAGNESEMIA: ICD-10-CM

## 2019-04-30 DIAGNOSIS — D84.9 IMMUNOSUPPRESSION (H): ICD-10-CM

## 2019-04-30 DIAGNOSIS — E55.9 VITAMIN D DEFICIENCY: ICD-10-CM

## 2019-04-30 DIAGNOSIS — Z94.0 KIDNEY REPLACED BY TRANSPLANT: ICD-10-CM

## 2019-04-30 DIAGNOSIS — E87.20 ACIDOSIS: ICD-10-CM

## 2019-04-30 RX ORDER — AMLODIPINE BESYLATE 10 MG/1
10 TABLET ORAL DAILY
Qty: 90 TABLET | Refills: 3 | Status: SHIPPED | OUTPATIENT
Start: 2019-04-30 | End: 2020-04-23

## 2019-04-30 NOTE — TELEPHONE ENCOUNTER
Medication: Nephrology RN Care Coordinator received prescription refill fax from [facility] pharmacy Gurvinder,  phone [phone] fax# [phone] for   amLODIPine (NORVASC) 5 MG tablet Take 2 tablets (10 mg) by mouth daily 5/22/17   Yes Benjamin Beltran MD     Last office visit: 6/11/18  Upcoming office visit: none scheduled    BP Readings from Last 5 Encounters:   06/11/18 134/80   07/03/17 129/78   05/18/17 122/75   05/16/17 (P) 135/86   05/15/17 130/80         Medication refilled per Nephrology medication protocol    Elham Servin, RN, BSN  Nephrology Care Coordinator  [facility]  [email or web address]  [phone]

## 2019-05-13 ENCOUNTER — TELEPHONE (OUTPATIENT)
Dept: TRANSPLANT | Facility: CLINIC | Age: 44
End: 2019-05-13

## 2019-05-13 NOTE — LETTER
[facility]  Room 2[address]200  [facility],  [address]  [address]  Tel [phone]  Toll Free [phone]                OUTPATIENT LABORATORY TEST ORDER    Patient Name: Rosibel Willingham  Transplant Date: 4/23/2017 (Kidney)  YOB: 1975                                                        Issue Date & Time:5-  16:14 PM    [facility] MR:  [id number]  Exp. Date (1 year after date issued)    Diagnoses: Kidney Transplant (ICD-10  Z94.0)   Long term use of medications (ICD-10  Z79.899)     Lab results to be available on the same day drawn.   Patient should release information to the [facility], [facility].  Please fax to the Transplant Center at [phone].    Monthly   ?Hemogram and Platelet  ?Basic Metabolic Panel (Sodium, Potassium, Chloride, CO2, Creatinine, Urea Nitrogen, Glucose, Calcium)  ?/Tacrolimus/Prograf drug level     Every 6 Months                 ?BK (Polyoma Virus) PCR Quantitative - Plasma                                           ?Urine for protein/creatinine    Yearly:   ? PRA/DSA level (mailers provided by the patient)     If you have any questions, please call [facility] at [phone] or [phone].    Please fax labs to [phone]    Benjamin Beltran

## 2019-06-18 DIAGNOSIS — D84.9 IMMUNOSUPPRESSION (H): ICD-10-CM

## 2019-06-18 DIAGNOSIS — E55.9 VITAMIN D DEFICIENCY: ICD-10-CM

## 2019-06-18 DIAGNOSIS — E87.20 ACIDOSIS: ICD-10-CM

## 2019-06-18 DIAGNOSIS — E83.42 HYPOMAGNESEMIA: ICD-10-CM

## 2019-06-18 DIAGNOSIS — N18.6 END STAGE RENAL DISEASE (H): ICD-10-CM

## 2019-06-18 DIAGNOSIS — Z94.0 KIDNEY REPLACED BY TRANSPLANT: ICD-10-CM

## 2019-06-19 ENCOUNTER — TELEPHONE (OUTPATIENT)
Dept: TRANSPLANT | Facility: CLINIC | Age: 44
End: 2019-06-19

## 2019-06-19 RX ORDER — TACROLIMUS 0.5 MG/1
0.5 CAPSULE ORAL 2 TIMES DAILY
Qty: 60 CAPSULE | Refills: 0 | Status: SHIPPED | OUTPATIENT
Start: 2019-06-19 | End: 2019-07-16

## 2019-06-19 RX ORDER — TACROLIMUS 1 MG/1
1 CAPSULE ORAL 2 TIMES DAILY
Qty: 60 CAPSULE | Refills: 0 | Status: SHIPPED | OUTPATIENT
Start: 2019-06-19 | End: 2019-07-16

## 2019-06-19 NOTE — TELEPHONE ENCOUNTER
Received a refill request for patients tacrolimus, no labs since 1/2019 and no appointment since 6/2018.   Called Rosibel and left a v/m requesting she complete transplant labs.  She is also due for an annual follow up appointment.  Message sent to .  Discussed she should be getting labs every other month.  1 month refill sent.

## 2019-07-02 DIAGNOSIS — E55.9 VITAMIN D DEFICIENCY: ICD-10-CM

## 2019-07-02 DIAGNOSIS — Z94.0 KIDNEY REPLACED BY TRANSPLANT: ICD-10-CM

## 2019-07-02 DIAGNOSIS — D84.9 IMMUNOSUPPRESSION (H): ICD-10-CM

## 2019-07-02 DIAGNOSIS — N18.6 END STAGE RENAL DISEASE (H): ICD-10-CM

## 2019-07-02 DIAGNOSIS — E83.42 HYPOMAGNESEMIA: ICD-10-CM

## 2019-07-02 DIAGNOSIS — E87.20 ACIDOSIS: ICD-10-CM

## 2019-07-02 RX ORDER — MYCOPHENOLIC ACID 180 MG/1
540 TABLET, DELAYED RELEASE ORAL 2 TIMES DAILY
Qty: 180 TABLET | Refills: 0 | Status: SHIPPED | OUTPATIENT
Start: 2019-07-02 | End: 2019-07-31

## 2019-07-02 NOTE — TELEPHONE ENCOUNTER
[facility] pharmacy left a VM message on 7/2/19 at 7:50 am patient needs a new prescription for 180 mg Mycophenolic Acid.

## 2019-07-16 DIAGNOSIS — Z94.0 KIDNEY REPLACED BY TRANSPLANT: ICD-10-CM

## 2019-07-16 DIAGNOSIS — E83.42 HYPOMAGNESEMIA: ICD-10-CM

## 2019-07-16 DIAGNOSIS — E87.20 ACIDOSIS: ICD-10-CM

## 2019-07-16 DIAGNOSIS — D84.9 IMMUNOSUPPRESSION (H): ICD-10-CM

## 2019-07-16 DIAGNOSIS — N18.6 END STAGE RENAL DISEASE (H): ICD-10-CM

## 2019-07-16 DIAGNOSIS — E55.9 VITAMIN D DEFICIENCY: ICD-10-CM

## 2019-07-16 RX ORDER — TACROLIMUS 0.5 MG/1
0.5 CAPSULE ORAL 2 TIMES DAILY
Qty: 60 CAPSULE | Refills: 0 | Status: SHIPPED | OUTPATIENT
Start: 2019-07-16 | End: 2019-08-14

## 2019-07-16 RX ORDER — TACROLIMUS 1 MG/1
1 CAPSULE ORAL 2 TIMES DAILY
Qty: 60 CAPSULE | Refills: 0 | Status: SHIPPED | OUTPATIENT
Start: 2019-07-16 | End: 2019-08-14

## 2019-07-30 DIAGNOSIS — N18.6 END STAGE RENAL DISEASE (H): ICD-10-CM

## 2019-07-30 DIAGNOSIS — E83.42 HYPOMAGNESEMIA: ICD-10-CM

## 2019-07-30 DIAGNOSIS — Z94.0 KIDNEY REPLACED BY TRANSPLANT: ICD-10-CM

## 2019-07-30 DIAGNOSIS — E55.9 VITAMIN D DEFICIENCY: ICD-10-CM

## 2019-07-30 DIAGNOSIS — E87.20 ACIDOSIS: ICD-10-CM

## 2019-07-30 DIAGNOSIS — D84.9 IMMUNOSUPPRESSION (H): ICD-10-CM

## 2019-07-30 NOTE — TELEPHONE ENCOUNTER
[facility] Call Center    Phone Message    May a detailed message be left on voicemail: yes    Reason for Call: Other: Pharmacy called wanting a refill on the MYCOPHENOLIC capsules please call to give the order. Script can also be faxed to [phone] or call [phone] option 3 please ref#[id number]     Action Taken: Other: ump rheumatology

## 2019-07-31 RX ORDER — MYCOPHENOLIC ACID 180 MG/1
540 TABLET, DELAYED RELEASE ORAL 2 TIMES DAILY
Qty: 180 TABLET | Refills: 0 | Status: SHIPPED | OUTPATIENT
Start: 2019-07-31 | End: 2019-09-03

## 2019-08-14 ENCOUNTER — TELEPHONE (OUTPATIENT)
Dept: TRANSPLANT | Facility: CLINIC | Age: 44
End: 2019-08-14

## 2019-08-14 DIAGNOSIS — N18.6 END STAGE RENAL DISEASE (H): ICD-10-CM

## 2019-08-14 DIAGNOSIS — E55.9 VITAMIN D DEFICIENCY: ICD-10-CM

## 2019-08-14 DIAGNOSIS — Z94.0 KIDNEY REPLACED BY TRANSPLANT: ICD-10-CM

## 2019-08-14 DIAGNOSIS — D84.9 IMMUNOSUPPRESSION (H): ICD-10-CM

## 2019-08-14 DIAGNOSIS — E87.20 ACIDOSIS: ICD-10-CM

## 2019-08-14 DIAGNOSIS — E83.42 HYPOMAGNESEMIA: ICD-10-CM

## 2019-08-14 RX ORDER — TACROLIMUS 1 MG/1
1 CAPSULE ORAL 2 TIMES DAILY
Qty: 60 CAPSULE | Refills: 0 | Status: SHIPPED | OUTPATIENT
Start: 2019-08-14 | End: 2019-09-16

## 2019-08-14 RX ORDER — TACROLIMUS 0.5 MG/1
0.5 CAPSULE ORAL 2 TIMES DAILY
Qty: 60 CAPSULE | Refills: 0 | Status: SHIPPED | OUTPATIENT
Start: 2019-08-14 | End: 2019-09-16

## 2019-08-14 NOTE — TELEPHONE ENCOUNTER
ISSUE:   Tacrolimus level 7.8 on 8/13, goal 4-6, dose 1.5 mg BID    PLAN:   Please call pt and confirm this was a good 12-hour trough. Verify dose 1.5 mg BID. Confirm no new medications or illness (chris. Diarrhea). If good trough, recommend repeating level in 1-2 weeks.  Will adjust dose at that time if needed.    OUTCOME:  [employer] message sent to patient.

## 2019-08-14 NOTE — LETTER
PHYSICIAN ORDERS      DATE & TIME ISSUED: August 15, 2019 1:24 PM  PATIENT NAME: Rosibel Willingham   : 1975     [facility] MR# [if applicable]: [id number]     DIAGNOSIS:  kidney transplant   ICD-10 CODE: Z94.0     Please obtain the following labs in 1-2 weeks    12 hour tacrolimus level        Any questions please call:  [phone]  Please fax results to [phone].    .

## 2019-08-15 NOTE — TELEPHONE ENCOUNTER
Rosibel responded via mychart.  She confirms an accurate trough and current dose.    She reports some diarrhea.  She plans to repeat in 1-2 weeks.   Called and spoke with [facility] in [place].  Lab orders faxed to [phone]    Patient lives in [place] and does not come back to [place] except over the holidays.  We discussed having her local nephrologist take over in prescribing her IS medication.  Rosibel states she follows at [facility], she used to see Dr. Sheriff, but will now be seeing Dr. Iraheta.    Called [facility]. Spoke with Zunilda.  Explained that the mycophenolic acid and tac will need to be prescribed by them going forward.  Zunilda verbalized understanding and will call back if this is an issue.  We reviewed patients current doses.

## 2019-09-03 DIAGNOSIS — N18.6 END STAGE RENAL DISEASE (H): ICD-10-CM

## 2019-09-03 DIAGNOSIS — D84.9 IMMUNOSUPPRESSION (H): ICD-10-CM

## 2019-09-03 DIAGNOSIS — E55.9 VITAMIN D DEFICIENCY: ICD-10-CM

## 2019-09-03 DIAGNOSIS — E87.20 ACIDOSIS: ICD-10-CM

## 2019-09-03 DIAGNOSIS — Z94.0 KIDNEY REPLACED BY TRANSPLANT: Primary | ICD-10-CM

## 2019-09-03 DIAGNOSIS — E83.42 HYPOMAGNESEMIA: ICD-10-CM

## 2019-09-03 RX ORDER — MYCOPHENOLIC ACID 180 MG/1
540 TABLET, DELAYED RELEASE ORAL 2 TIMES DAILY
Qty: 180 TABLET | Refills: 0 | Status: SHIPPED | OUTPATIENT
Start: 2019-09-03 | End: 2019-09-27

## 2019-09-03 NOTE — TELEPHONE ENCOUNTER
Voicemail  Date/Time: 9/3/19 at  07:28 am  Reason for call: patient is in need of a refill Mycophenolic acid 180 mg please send prescription for 30 days.

## 2019-09-16 DIAGNOSIS — E83.42 HYPOMAGNESEMIA: ICD-10-CM

## 2019-09-16 DIAGNOSIS — N18.6 END STAGE RENAL DISEASE (H): ICD-10-CM

## 2019-09-16 DIAGNOSIS — E55.9 VITAMIN D DEFICIENCY: ICD-10-CM

## 2019-09-16 DIAGNOSIS — E87.20 ACIDOSIS: ICD-10-CM

## 2019-09-16 DIAGNOSIS — Z94.0 KIDNEY REPLACED BY TRANSPLANT: Primary | ICD-10-CM

## 2019-09-16 DIAGNOSIS — D84.9 IMMUNOSUPPRESSION (H): ICD-10-CM

## 2019-09-16 RX ORDER — TACROLIMUS 1 MG/1
1 CAPSULE ORAL 2 TIMES DAILY
Qty: 60 CAPSULE | Refills: 0 | Status: SHIPPED | OUTPATIENT
Start: 2019-09-16 | End: 2019-10-08

## 2019-09-16 RX ORDER — TACROLIMUS 0.5 MG/1
0.5 CAPSULE ORAL 2 TIMES DAILY
Qty: 60 CAPSULE | Refills: 0 | Status: SHIPPED | OUTPATIENT
Start: 2019-09-16 | End: 2019-10-08

## 2019-09-16 NOTE — TELEPHONE ENCOUNTER
Provider Call: Medication Refill  Route to LPN  Pharmacy Name: [facility] [place], [place]    Name of Medication: Tacrolimus 1.0 and 0.5   When will the patient be out of this medication?: Less than 24 hours (Dustin AMANDAN, then page if no answer)  Callback needed? Yes    Return Call Needed  Same as documented in contacts section  When to return call?: Same day: Route High Priority     [facility] states;  They never received the E-Scribe for the tacrolimus

## 2019-09-27 DIAGNOSIS — Z94.0 KIDNEY REPLACED BY TRANSPLANT: ICD-10-CM

## 2019-09-27 DIAGNOSIS — E83.42 HYPOMAGNESEMIA: ICD-10-CM

## 2019-09-27 DIAGNOSIS — N18.6 END STAGE RENAL DISEASE (H): ICD-10-CM

## 2019-09-27 DIAGNOSIS — E55.9 VITAMIN D DEFICIENCY: ICD-10-CM

## 2019-09-27 DIAGNOSIS — E87.20 ACIDOSIS: ICD-10-CM

## 2019-09-27 DIAGNOSIS — D84.9 IMMUNOSUPPRESSION (H): ICD-10-CM

## 2019-09-27 RX ORDER — MYCOPHENOLIC ACID 180 MG/1
540 TABLET, DELAYED RELEASE ORAL 2 TIMES DAILY
Qty: 180 TABLET | Refills: 0 | Status: SHIPPED | OUTPATIENT
Start: 2019-09-27

## 2019-09-27 NOTE — TELEPHONE ENCOUNTER
Provider Call: Medication Refill  Route to LPN  Pharmacy Name: [facility]  Pharmacy Location: [place]  Name of Medication: Mycophenolic Acid 180 mg  When will the patient be out of this medication?: Less than 3 days (Route high priority)  Callback needed? If needed

## 2019-10-02 ENCOUNTER — TRANSFERRED RECORDS (OUTPATIENT)
Dept: HEALTH INFORMATION MANAGEMENT | Facility: CLINIC | Age: 44
End: 2019-10-02

## 2019-10-08 ENCOUNTER — TELEPHONE (OUTPATIENT)
Dept: TRANSPLANT | Facility: CLINIC | Age: 44
End: 2019-10-08

## 2019-10-08 DIAGNOSIS — E83.42 HYPOMAGNESEMIA: ICD-10-CM

## 2019-10-08 DIAGNOSIS — D84.9 IMMUNOSUPPRESSION (H): ICD-10-CM

## 2019-10-08 DIAGNOSIS — E87.20 ACIDOSIS: ICD-10-CM

## 2019-10-08 DIAGNOSIS — E55.9 VITAMIN D DEFICIENCY: ICD-10-CM

## 2019-10-08 DIAGNOSIS — Z94.0 KIDNEY REPLACED BY TRANSPLANT: Primary | ICD-10-CM

## 2019-10-08 DIAGNOSIS — N18.6 END STAGE RENAL DISEASE (H): ICD-10-CM

## 2019-10-08 RX ORDER — TACROLIMUS 0.5 MG/1
0.5 CAPSULE ORAL 2 TIMES DAILY
Qty: 60 CAPSULE | Refills: 0 | Status: SHIPPED | OUTPATIENT
Start: 2019-10-08 | End: 2019-11-20

## 2019-10-08 RX ORDER — TACROLIMUS 1 MG/1
1 CAPSULE ORAL 2 TIMES DAILY
Qty: 60 CAPSULE | Refills: 0 | Status: SHIPPED | OUTPATIENT
Start: 2019-10-08 | End: 2019-11-21

## 2019-10-08 NOTE — TELEPHONE ENCOUNTER
Received a request to refill Tacrolimus. Rosibel has not been seen by nephrology here for over 1 year. Per notes she lives in [place] and follows with [facility].   Called Rosibel, no answer. Voicemail left informing Rosibel that she will need to begin having IS meds filled with her local nephrologist. Requested a call back.  Because she does have recent labs, and will be out of meds in less than 24 hours sent prescription for one month with no refills.

## 2019-11-11 DIAGNOSIS — D84.9 IMMUNOSUPPRESSION (H): ICD-10-CM

## 2019-11-11 DIAGNOSIS — E87.20 ACIDOSIS: ICD-10-CM

## 2019-11-11 DIAGNOSIS — E55.9 VITAMIN D DEFICIENCY: ICD-10-CM

## 2019-11-11 DIAGNOSIS — E83.42 HYPOMAGNESEMIA: ICD-10-CM

## 2019-11-11 DIAGNOSIS — Z94.0 KIDNEY REPLACED BY TRANSPLANT: Primary | ICD-10-CM

## 2019-11-11 DIAGNOSIS — N18.6 END STAGE RENAL DISEASE (H): ICD-10-CM

## 2019-11-12 DIAGNOSIS — E55.9 VITAMIN D DEFICIENCY: ICD-10-CM

## 2019-11-12 DIAGNOSIS — E87.20 ACIDOSIS: ICD-10-CM

## 2019-11-12 DIAGNOSIS — Z94.0 KIDNEY REPLACED BY TRANSPLANT: Primary | ICD-10-CM

## 2019-11-12 DIAGNOSIS — E83.42 HYPOMAGNESEMIA: ICD-10-CM

## 2019-11-12 DIAGNOSIS — N18.6 END STAGE RENAL DISEASE (H): ICD-10-CM

## 2019-11-12 DIAGNOSIS — D84.9 IMMUNOSUPPRESSION (H): ICD-10-CM

## 2019-11-12 RX ORDER — TACROLIMUS 1 MG/1
1 CAPSULE ORAL 2 TIMES DAILY
Qty: 60 CAPSULE | Refills: 0 | OUTPATIENT
Start: 2019-11-12

## 2019-11-12 RX ORDER — TACROLIMUS 0.5 MG/1
0.5 CAPSULE ORAL 2 TIMES DAILY
Qty: 60 CAPSULE | Refills: 0 | OUTPATIENT
Start: 2019-11-12

## 2019-11-13 RX ORDER — TACROLIMUS 0.5 MG/1
0.5 CAPSULE ORAL 2 TIMES DAILY
Qty: 60 CAPSULE | Refills: 0 | OUTPATIENT
Start: 2019-11-13

## 2019-11-20 ENCOUNTER — TELEPHONE (OUTPATIENT)
Dept: TRANSPLANT | Facility: CLINIC | Age: 44
End: 2019-11-20

## 2019-11-20 DIAGNOSIS — D84.9 IMMUNOSUPPRESSION (H): ICD-10-CM

## 2019-11-20 DIAGNOSIS — E83.42 HYPOMAGNESEMIA: ICD-10-CM

## 2019-11-20 DIAGNOSIS — E55.9 VITAMIN D DEFICIENCY: ICD-10-CM

## 2019-11-20 DIAGNOSIS — N18.6 END STAGE RENAL DISEASE (H): ICD-10-CM

## 2019-11-20 DIAGNOSIS — Z94.0 KIDNEY REPLACED BY TRANSPLANT: ICD-10-CM

## 2019-11-20 DIAGNOSIS — E87.20 ACIDOSIS: ICD-10-CM

## 2019-11-20 NOTE — TELEPHONE ENCOUNTER
Provider Call: Medication Refill  Route to LPN  Pharmacy Name: [facility]   Pharmacy Location: [place], [place]  Name of Medication: Tacrolimus 0.5 mg and 1mg  When will the patient be out of this medication?: Less than 3 days (Route high priority)  Callback needed? If needed

## 2019-11-21 DIAGNOSIS — N18.6 END STAGE RENAL DISEASE (H): ICD-10-CM

## 2019-11-21 DIAGNOSIS — E55.9 VITAMIN D DEFICIENCY: ICD-10-CM

## 2019-11-21 DIAGNOSIS — D84.9 IMMUNOSUPPRESSION (H): ICD-10-CM

## 2019-11-21 DIAGNOSIS — Z94.0 KIDNEY REPLACED BY TRANSPLANT: Primary | ICD-10-CM

## 2019-11-21 DIAGNOSIS — E83.42 HYPOMAGNESEMIA: ICD-10-CM

## 2019-11-21 DIAGNOSIS — E87.20 ACIDOSIS: ICD-10-CM

## 2019-11-21 RX ORDER — TACROLIMUS 0.5 MG/1
CAPSULE ORAL
Qty: 60 CAPSULE | Refills: 3 | Status: SHIPPED | OUTPATIENT
Start: 2019-11-21 | End: 2020-03-03

## 2019-11-21 RX ORDER — TACROLIMUS 1 MG/1
1 CAPSULE ORAL 2 TIMES DAILY
Qty: 60 CAPSULE | Refills: 0 | Status: SHIPPED | OUTPATIENT
Start: 2019-11-21 | End: 2020-03-03

## 2019-11-21 RX ORDER — TACROLIMUS 0.5 MG/1
0.5 CAPSULE ORAL 2 TIMES DAILY
Qty: 60 CAPSULE | Refills: 0 | Status: SHIPPED | OUTPATIENT
Start: 2019-11-21 | End: 2020-03-03

## 2019-11-21 RX ORDER — TACROLIMUS 1 MG/1
1 CAPSULE ORAL 2 TIMES DAILY
Qty: 60 CAPSULE | Refills: 0 | Status: SHIPPED | OUTPATIENT
Start: 2019-11-21 | End: 2020-03-23

## 2019-12-12 DIAGNOSIS — D84.9 IMMUNOSUPPRESSION (H): ICD-10-CM

## 2019-12-12 DIAGNOSIS — E55.9 VITAMIN D DEFICIENCY: ICD-10-CM

## 2019-12-12 DIAGNOSIS — E87.20 ACIDOSIS: ICD-10-CM

## 2019-12-12 DIAGNOSIS — Z94.0 KIDNEY REPLACED BY TRANSPLANT: ICD-10-CM

## 2019-12-12 DIAGNOSIS — N18.6 END STAGE RENAL DISEASE (H): ICD-10-CM

## 2019-12-12 DIAGNOSIS — E83.42 HYPOMAGNESEMIA: ICD-10-CM

## 2020-02-13 DIAGNOSIS — Z79.899 ENCOUNTER FOR LONG-TERM CURRENT USE OF MEDICATION: ICD-10-CM

## 2020-02-13 DIAGNOSIS — Z94.0 KIDNEY REPLACED BY TRANSPLANT: Primary | ICD-10-CM

## 2020-02-13 DIAGNOSIS — Z48.298 AFTERCARE FOLLOWING ORGAN TRANSPLANT: ICD-10-CM

## 2020-03-03 DIAGNOSIS — N18.6 END STAGE RENAL DISEASE (H): ICD-10-CM

## 2020-03-03 DIAGNOSIS — E83.42 HYPOMAGNESEMIA: ICD-10-CM

## 2020-03-03 DIAGNOSIS — E55.9 VITAMIN D DEFICIENCY: ICD-10-CM

## 2020-03-03 DIAGNOSIS — E87.20 ACIDOSIS: ICD-10-CM

## 2020-03-03 DIAGNOSIS — Z94.0 KIDNEY REPLACED BY TRANSPLANT: Primary | ICD-10-CM

## 2020-03-03 DIAGNOSIS — D84.9 IMMUNOSUPPRESSION (H): ICD-10-CM

## 2020-03-04 RX ORDER — TACROLIMUS 0.5 MG/1
0.5 CAPSULE ORAL 2 TIMES DAILY
Qty: 60 CAPSULE | Refills: 0 | Status: SHIPPED | OUTPATIENT
Start: 2020-03-04

## 2020-03-10 ENCOUNTER — HEALTH MAINTENANCE LETTER (OUTPATIENT)
Age: 45
End: 2020-03-10

## 2020-03-23 DIAGNOSIS — E87.20 ACIDOSIS: ICD-10-CM

## 2020-03-23 DIAGNOSIS — N18.6 END STAGE RENAL DISEASE (H): ICD-10-CM

## 2020-03-23 DIAGNOSIS — E83.42 HYPOMAGNESEMIA: ICD-10-CM

## 2020-03-23 DIAGNOSIS — D84.9 IMMUNOSUPPRESSION (H): ICD-10-CM

## 2020-03-23 DIAGNOSIS — Z94.0 KIDNEY REPLACED BY TRANSPLANT: Primary | ICD-10-CM

## 2020-03-23 DIAGNOSIS — E55.9 VITAMIN D DEFICIENCY: ICD-10-CM

## 2020-03-23 RX ORDER — TACROLIMUS 1 MG/1
1 CAPSULE ORAL 2 TIMES DAILY
Qty: 60 CAPSULE | Refills: 0 | Status: SHIPPED | OUTPATIENT
Start: 2020-03-23

## 2020-03-24 ENCOUNTER — TELEPHONE (OUTPATIENT)
Dept: TRANSPLANT | Facility: CLINIC | Age: 45
End: 2020-03-24

## 2020-03-24 NOTE — TELEPHONE ENCOUNTER
Received a request to fill Rosibel's Tacrolimus. Rosibel follows with a local nephrologist in [place]. I notified Rosibel that I will give one month of Tacrolimus, but she needs to have her local MD fill meds from now on. Rosibel states that she is working on it, but the pharmacy hasn't reached her doctor yet.

## 2020-04-23 DIAGNOSIS — Z94.0 KIDNEY REPLACED BY TRANSPLANT: ICD-10-CM

## 2020-04-23 DIAGNOSIS — N18.6 END STAGE RENAL DISEASE (H): ICD-10-CM

## 2020-04-23 DIAGNOSIS — D84.9 IMMUNOSUPPRESSION (H): ICD-10-CM

## 2020-04-23 DIAGNOSIS — E87.20 ACIDOSIS: ICD-10-CM

## 2020-04-23 DIAGNOSIS — E55.9 VITAMIN D DEFICIENCY: ICD-10-CM

## 2020-04-23 DIAGNOSIS — E83.42 HYPOMAGNESEMIA: ICD-10-CM

## 2020-04-23 RX ORDER — AMLODIPINE BESYLATE 10 MG/1
10 TABLET ORAL DAILY
Qty: 90 TABLET | Refills: 0 | Status: SHIPPED | OUTPATIENT
Start: 2020-04-23

## 2020-04-23 NOTE — TELEPHONE ENCOUNTER
Refill request for Amlodipine besylate 10 mg tabs once daily being refilled at just 3 months per protocol. Patient needing an office visit--virtually or over the phone due to COVID. Last appointment in 2018. Message sent to Rosibel via [employer].

## 2020-06-10 ENCOUNTER — TRANSFERRED RECORDS (OUTPATIENT)
Dept: HEALTH INFORMATION MANAGEMENT | Facility: CLINIC | Age: 45
End: 2020-06-10

## 2020-06-11 ENCOUNTER — TELEPHONE (OUTPATIENT)
Dept: TRANSPLANT | Facility: CLINIC | Age: 45
End: 2020-06-11

## 2020-06-11 NOTE — TELEPHONE ENCOUNTER
Issue:  Tacrolimus level 6.9 (goal 4-6), current dose 1.5 mg BID  Creatinine 1.26-at baseline    Plan:  Call Rosibel and assess:  Was this a good 12 hour trough?  Is current dose 1.5 mg BID?  Any acute health issues? Vomiting? Diarrhea?  Previous levels at goal. Maintain same dose and have a level redrawn in 1 week.  Assess if local nephrologist is now prescribing meds and dosing immunosuppression?    LPN Task:  Please call with above plan. Send a lab order if needed.  Thanks!

## 2020-06-11 NOTE — TELEPHONE ENCOUNTER
Call placed to patient. No answer. Detailed voice message left with instructions listed below. Will try back

## 2020-06-12 NOTE — TELEPHONE ENCOUNTER
Call placed to patient. No answer. Voice message left requesting a return call to discuss tacrolimus level; and if local nephrologist is managing IS. Instructions left for patient to repeat level in 1-2 weeks.

## 2020-06-15 ENCOUNTER — DOCUMENTATION ONLY (OUTPATIENT)
Dept: TRANSPLANT | Facility: CLINIC | Age: 45
End: 2020-06-15

## 2020-06-15 NOTE — PROGRESS NOTES
Received the following MyChart from Rosibel:    Hey there,  I got a couple voice mails about my tacrolimus.  Labs were 12 hours and 15 minutes after I took my last dose.  I'm still taking 1.5 mg twice/day.   I'm not able to do another draw next week.  My nephrologist here in [place] hasn't reached out and she said she would if she was concerned at all with the results.   I feel amazing and have no health concerns. I'm being extremely careful with my surroundings due to COVID-19.   Feel free to message me with any other inquiries.  Thanks so much,  Rosibel

## 2020-08-18 NOTE — PROGRESS NOTES
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  Wants OCTAVIO removed.    Transplant History:    Transplant Type:  DDKT  Donor Type:  - Brain Death   Transplant Date:  2017 (Kidney)   Ureteral Stent:  Yes - removed today.    Crossmatch:  negative   DSA at Tx:  No  Baseline Cr: 2.7-3.2   DeNovo DSA:     Acute Rejection Hx:  No    Present Maintenance Immunosuppression:  Tacrolimus and Mycophenolate mofetil    CMV IgG Ab Discordance:    EBV IgG Ab Discordance:      BK Viremia:    EBV Viremia:      Transplant Coordinator: Chinyere Burnham     Transplant Office Phone Number: [phone]        Immunsupresent Medications     Immunosuppressive Agents Disp Start End    tacrolimus (PROGRAF - GENERIC EQUIVALENT) 1 MG capsule 100 capsule 2017     Sig - Route: Take 4 capsules (4 mg) by mouth 2 times daily - Oral    Class: Historical    Notes to Pharmacy: Take with 0.5 mg cap q 12 hours, for total dose of 4.5 mg BID per Dr. Beltran    PROGRAF 1 MG PO CAPSULE 180 capsule 2017     Sig - Route: Take 1 capsule (1 mg) by mouth 2 times daily - Oral    Class: E-Prescribe    Notes to Pharmacy: Take with 0.5 mg po bid total dose 3.5 mg po bid    tacrolimus (PROGRAF - GENERIC EQUIVALENT) 0.5 MG capsule 60 capsule 2017     Sig - Route: Take 1 capsule (0.5 mg) by mouth every 12 hours Use for dose adjustments. - Oral    Class: E-Prescribe    Notes to Pharmacy: Total dose of 3.5 mg po every 12 hours    mycophenolic acid (MYFORTIC - GENERIC EQUIVALENT) 180 MG EC tablet 240 tablet 2017     Sig - Route: Take 3 tablets (540 mg) by mouth 2 times daily - Oral    Class: E-Prescribe        Possible Immunosuppression-related side effects:   []             headache  []             vivid dreams  []             irritability  []             fine tremor  []             nausea  []             diarrhea  []             neuropathy      []             edema  []              renal calcineurin toxicity  []             hyperkalemia  []             post-transplant diabetes  []             decreased appetite  []             increased appetite  []             other:  []             none    Prescription Medications as of 5/15/2017             tacrolimus (PROGRAF - GENERIC EQUIVALENT) 1 MG capsule Take 4 capsules (4 mg) by mouth 2 times daily    PROGRAF 1 MG PO CAPSULE Take 1 capsule (1 mg) by mouth 2 times daily    tacrolimus (PROGRAF - GENERIC EQUIVALENT) 0.5 MG capsule Take 1 capsule (0.5 mg) by mouth every 12 hours Use for dose adjustments.    amLODIPine (NORVASC) 5 MG tablet Take 1 tablet (5 mg) by mouth daily    nystatin (MYCOSTATIN) 158268 unit/mL SUSP suspension Swish and swallow 5 mLs (500,000 Units) in mouth 4 times daily    mycophenolic acid (MYFORTIC - GENERIC EQUIVALENT) 180 MG EC tablet Take 3 tablets (540 mg) by mouth 2 times daily    sodium bicarbonate 650 MG tablet Take 1 tablet (650 mg) by mouth 2 times daily    cholecalciferol (VITAMIN  -D) 1000 UNITS capsule Take 1 capsule (1,000 Units) by mouth daily    magnesium oxide (MAG-OX) 400 MG tablet Take 1 tablet (400 mg) by mouth 2 times daily    ondansetron (ZOFRAN) 4 MG tablet Take 1 tablet (4 mg) by mouth every 8 hours as needed for nausea    sulfamethoxazole-trimethoprim (BACTRIM/SEPTRA) 400-80 MG per tablet Take 1 tablet by mouth Every Mon, Wed, Fri Morning    valGANciclovir (VALCYTE) 450 MG tablet Take 1 tablet (450 mg) by mouth twice a week    acetaminophen (TYLENOL) 325 MG tablet Take 2 tablets (650 mg) by mouth every 4 hours as needed for mild pain or fever    senna-docusate (SENOKOT-S;PERICOLACE) 8.6-50 MG per tablet Take 1-2 tablets by mouth daily as needed for constipation      Facility Administered Medications as of 5/15/2017             levofloxacin (LEVAQUIN) infusion 250 mg Inject 50 mLs (250 mg) into the vein once    lactated ringers infusion (Discontinued) Inject into the vein continuous    No Pre Procedure  "Antibiotic Needed (Discontinued) 1 each continuous    lactated ringers infusion (Discontinued) Inject into the vein continuous    ondansetron (ZOFRAN-ODT) ODT tab 4 mg (Discontinued) Take 1 tablet (4 mg) by mouth every 30 minutes as needed for nausea or vomiting    Linked Group 1:  \"Or\" Linked Group Details     ondansetron (ZOFRAN) injection 4 mg (Discontinued) Inject 2 mLs (4 mg) into the vein every 30 minutes as needed for nausea or vomiting    Linked Group 1:  \"Or\" Linked Group Details     prochlorperazine (COMPAZINE) injection 5-10 mg (Discontinued) Inject 1-2 mLs (5-10 mg) into the vein every 6 hours as needed for nausea or vomiting    meperidine (DEMEROL) injection 12.5 mg (Discontinued) Inject 0.5 mLs (12.5 mg) into the vein every 15 minutes as needed for post anesthesia shivering    naloxone (NARCAN) injection 0.1-0.4 mg (Discontinued) Inject 0.25-1 mLs (0.1-0.4 mg) into the vein every 2 minutes as needed for opioid reversal    lactated ringers infusion (Discontinued) Inject into the vein continuous prn    midazolam (VERSED) injection (Discontinued) Inject into the vein as needed for anxiety    lidocaine injection 2% (MDV) (Discontinued) Inject into the vein as needed    propofol (DIPRIVAN) injection 10 mg/mL vial (Discontinued) Inject into the vein continuous prn    propofol (DIPRIVAN) injection 10 mg/mL vial (Discontinued) Inject into the vein as needed    fentaNYL Citrate (PF) (SUBLIMAZE) injection (Discontinued) Inject into the vein as needed for moderate to severe pain          O:   Temp:  [97.9  F (36.6  C)-98.7  F (37.1  C)] 98.2  F (36.8  C)  Pulse:  [84-92] 92  Resp:  [16-20] 16  BP: ()/(60-87) 130/80  SpO2:  [98 %-100 %] 100 %  General Appearance: in no apparent distress.   Skin: Normal, no rashes or jaundice  Heart: regular rate and rhythm, normal S1 and S2  Lungs: easy respirations, no audible wheezing.  Abdomen: flat, The wound is dry and intact, without hernia. The abdomen is non-tender. " The kidney graft is not tender.  There is no ascites.  Extremities: Tremor absent.   Edema: absent. 1+    Transplant Immunosuppression Labs Latest Ref Rng & Units 5/15/2017 5/14/2017 5/12/2017 5/11/2017 5/9/2017   Tacro Level 5.0 - 15.0 ug/L - 8.1 5.3 7.0 4.3(L)   Tacro Level - - Not Provided 05.11.17 @ 2100 Not Provided Not Provided   Creat 0.52 - 1.04 mg/dL 3.10(H) 2.87(H) 2.90(H) 2.77(H) 3.24(H)   BUN 7 - 30 mg/dL 33(H) 35(H) 34(H) 32(H) 44(H)   WBC 4.0 - 11.0 10e9/L 5.1 4.5 4.8 5.5 6.7   Neutrophil % 84.6 83.4 - 85.8 -   ANEU 1.6 - 8.3 10e9/L 4.3 3.7 - 4.9 -       Chemistries:   Recent Labs   Lab Test  05/15/17   1002   BUN  33*   CR  3.10*   GFRESTIMATED  17*   GLC  88     Lab Results   Component Value Date    A1C 4.0 04/22/2017     Recent Labs   Lab Test  04/22/17   1230   ALBUMIN  4.8   BILITOTAL  0.5   ALKPHOS  56   AST  16   ALT  32     Urine Studies:  Recent Labs   Lab Test  05/15/17   1038   COLOR  Yellow   APPEARANCE  Clear   URINEGLC  Negative   URINEBILI  Negative   URINEKETONE  Negative   SG  1.012   UBLD  Small*   URINEPH  6.5   PROTEIN  30*   NITRITE  Negative   LEUKEST  Negative   RBCU  5*   WBCU  3*     Recent Labs   Lab Test  05/08/17   1145  04/28/17   1110   UTPG  1.24*  3.72*     Hematology:   Recent Labs   Lab Test  05/15/17   1002  05/14/17   0840  05/12/17   0945   HGB  8.4*  8.5*  8.8*   PLT  334  389  377   WBC  5.1  4.5  4.8     Coags:   Recent Labs   Lab Test  04/22/17   1230  12/11/16   0748   INR  1.04  1.11     HLA antibodies:   SA1 Hi Risk Ronda   Date Value Ref Range Status   04/22/2017   Final    A:29 B:13 18 27 35 37 38 39 41 44 45 46 47 48 49 50 51 52 53 54 55 56 57   58 59 60 61 62 63 64 65 67 71 72 75 76 77 78 82 Cw:2 9 10       SA1 Mod Risk Ronda   Date Value Ref Range Status   04/22/2017 A:11 25 43 66 69 B:73 Cw:4 5 6 12 15 17 18  Final     SA2 Hi Risk Ronda   Date Value Ref Range Status   04/22/2017 None  Final     SA2 Mod Risk Ronda   Date Value Ref Range Status   04/22/2017 DR:13   Final       Assessment: Persistently elevated Cr.      Plan:    1. Graft function: persistently elevated Cr.  Plan for biopsy tomorrow.  Dr. Beltran aware.  2. Immunosuppression Management: No change Tac, MMF .  Complexity of management:Medium.  Contributing factors: potential rejection and delayed graft function  3. Will give 1 L fluid bolus today.  4. Will make decision on OCTAVIO after biopsy.    Followup: will follow up after biopsy      Bebeto Duran MD  Transplant Surgery Fellow  Pager: [phone]    I have reviewed history, examined patient and discussed plan with the fellow/resident/OG.  I concur with the findings in this note.       Immunosuppressive regimen, management and long term risks discussed in detail. Changes, when applicable made as per orders.      Total time: 15 min  Counseling time: 10 min                 no

## 2020-12-27 ENCOUNTER — HEALTH MAINTENANCE LETTER (OUTPATIENT)
Age: 45
End: 2020-12-27

## 2021-03-06 ENCOUNTER — HEALTH MAINTENANCE LETTER (OUTPATIENT)
Age: 46
End: 2021-03-06

## 2021-03-31 NOTE — TELEPHONE ENCOUNTER
Rosibel called back. 08/29/17  Stopped taking Valcyte on Wednesday, August 23rd, 2017.   Confirmed she will be going in to lab for EBV/CMV PCR QT with her next routine lab draw.  
Yes

## 2021-04-24 ENCOUNTER — HEALTH MAINTENANCE LETTER (OUTPATIENT)
Age: 46
End: 2021-04-24

## 2021-05-31 ENCOUNTER — RECORDS - HEALTHEAST (OUTPATIENT)
Dept: ADMINISTRATIVE | Facility: CLINIC | Age: 46
End: 2021-05-31

## 2021-10-09 ENCOUNTER — HEALTH MAINTENANCE LETTER (OUTPATIENT)
Age: 46
End: 2021-10-09

## 2022-03-20 ENCOUNTER — HEALTH MAINTENANCE LETTER (OUTPATIENT)
Age: 47
End: 2022-03-20

## 2022-05-16 ENCOUNTER — HEALTH MAINTENANCE LETTER (OUTPATIENT)
Age: 47
End: 2022-05-16

## 2022-09-11 ENCOUNTER — HEALTH MAINTENANCE LETTER (OUTPATIENT)
Age: 47
End: 2022-09-11

## 2023-05-06 ENCOUNTER — HEALTH MAINTENANCE LETTER (OUTPATIENT)
Age: 48
End: 2023-05-06

## 2023-06-03 ENCOUNTER — HEALTH MAINTENANCE LETTER (OUTPATIENT)
Age: 48
End: 2023-06-03

## 2023-10-03 ENCOUNTER — TELEPHONE (OUTPATIENT)
Dept: TRANSPLANT | Facility: CLINIC | Age: 48
End: 2023-10-03
Payer: COMMERCIAL

## 2023-10-03 NOTE — TELEPHONE ENCOUNTER
Patient Call: General  Route to LPN    Reason for call: DR. Julian would like to speak with Coordinator regarding patient's rising Creatinine level     Call back needed? Yes    Return Call Needed  Same as documented in contacts section  When to return call?: Same day: Route High Priority

## 2023-10-04 NOTE — TELEPHONE ENCOUNTER
Dr. Mccain states Ivette' UPC is up to 1.5 g, started and ACE inhibitor a couple of months ago. Creatinine is now up to 1.8. Wondering if she should have a biopsy.  Discussed we have not seen Rosibel in >5 years and have not received lab results in >3 years.   Increase in creatinine may be due to ACE, or any other number of factors.   Advised Dr. Hancock that we cannot see Rosibel virtually, recommended he refer her to a local transplant center for consult.

## 2024-04-25 NOTE — H&P
Patient: Sandy Smith    Procedure Summary       Date: 04/25/24 Room / Location: [facility] 03 / [facility]    Anesthesia Start: 0806 Anesthesia Stop: 0911    Procedure: LEFT ENDOSCOPIC CARPAL TUNNEL RELEASE (Left: Hand) Diagnosis:       Carpal tunnel syndrome, right upper limb      (Carpal tunnel syndrome, right upper limb [G56.01])    Surgeons: Aurelio Hastings DO Responsible Provider: Austin Dawkins MD    Anesthesia Type: general ASA Status: 3            Anesthesia Type: general    Vitals Value Taken Time   BP 94/70 04/25/24 0859   Temp 36.1 °C (97 °F) 04/25/24 0859   Pulse 61 04/25/24 0859   Resp 13 04/25/24 0859   SpO2 99 % 04/25/24 0859       Anesthesia Post Evaluation    Patient location during evaluation: PACU  Patient participation: complete - patient participated  Level of consciousness: awake  Pain management: adequate  Airway patency: patent  Cardiovascular status: acceptable  Respiratory status: acceptable  Hydration status: acceptable  Postoperative Nausea and Vomiting: none        There were no known notable events for this encounter.     Transplant Surgery History and Physical    Name: Rosibel Willingham MRN # [id number]   Age: 41 year old YOB: 1975     Date of Admission: April 22, 2017  Admitting service: Transplant - Kidney  Admitting physician: Dr. Montelongo            Chief Complaint:   ESRD         History of Present Illness:   Rsoibel Willingham is a 41 year old female with ESRD of unknown origin on dialysis since July 2016 presents today as DDKT candidate. She feels well today. She still makes urine. Denies fever, chills, SOB, chest pain, nausea/vomiting dysuria.          Review of Systems:   The Review of Systems is negative other than noted in the HPI         Past Medical History:     Past Medical History:   Diagnosis Date     Anemia of chronic kidney failure      End stage kidney disease (H)      Hypertension      Secondary hyperparathyroidism (H)              Past Surgical History:     Past Surgical History:   Procedure Laterality Date     APPENDECTOMY       CREATE FISTULA ARTERIOVENOUS UPPER EXTREMITY               Social History:     Social History     Social History     Marital status:      Spouse name: N/A     Number of children: N/A     Years of education: N/A     Occupational History     Not on file.     Social History Main Topics     Smoking status: Former Smoker     Packs/day: 0.50     Years: 2.00     Types: Cigarettes     Quit date: 9/26/1998     Smokeless tobacco: Never Used     Alcohol use 1.2 oz/week     2 Standard drinks or equivalent per week     Drug use: No     Sexual activity: Not on file     Other Topics Concern     Not on file     Social History Narrative            Family History:     Family History   Problem Relation Age of Onset     Family History Negative              Allergies:      Allergies   Allergen Reactions     Erythromycin Hives            Medications:     No current facility-administered medications on file prior to encounter.   Current Outpatient Prescriptions on File Prior to  "Encounter:  amLODIPine (NORVASC) 10 MG tablet Take 10 mg by mouth daily   metoprolol (TOPROL-XL) 25 MG 24 hr tablet Take 25 mg by mouth daily            Vital Signs:   Blood pressure (!) 168/95, pulse 100, temperature 97.8  F (36.6  C), temperature source Oral, resp. rate 16, height 1.575 m (5' 2\"), weight 52.9 kg (116 lb 10 oz).    Vitals were reviewed.         Physical Exam:   General: Alert & oriented (x3), following commands  Chest: Non labored breathing, clear on auscultation  CVS: RRR. Pansystolic murmur radiating to the neck  Abdomen: soft, non tender, non distended  Extremities: Warm and well perfused, non tender, peripheral pulses palpable         Data:   Labs:  Pending    Imaging:   Pending            Assessment and Plan:   Assessment:   41F w/ ESRD on dialysis here as a DDKT candidate      Plan:   - Admit to Transplant surgery service  - NPO after lunch  - Nephro consult for dialysis today  - Pre op work up  - STAT labs       Assessment and plan discussed with the primary team.     Ariel Rico MD  Surgery, PGY-1  [phone]    I have reviewed history, examined patient and discussed plan with the fellow/resident/OG.  I concur with the findings in this note.             "

## (undated) DEVICE — CONNECTOR WATER VALVE PERFUSION PACK STR 020272801

## (undated) DEVICE — SEAL CYSTOSCOPE TIP 6-12FR CS-B612

## (undated) DEVICE — SU ETHILON 3-0 PS-1 18" 1663H

## (undated) DEVICE — SU PDS II 6-0 RB-2DA 30" Z149H

## (undated) DEVICE — PACK GOWN 3/PK DISP XL SBA32GPFCB

## (undated) DEVICE — RETR VISCERA FISH MED 3204

## (undated) DEVICE — DRAPE LAP W/ARMBOARD 29410

## (undated) DEVICE — LINEN TOWEL PACK X5 5464

## (undated) DEVICE — STPL LINEAR CUTTER VASCULAR 60X25MM GIA6025S

## (undated) DEVICE — SU SILK 0 TIE 6X30" A306H

## (undated) DEVICE — SU VICRYL 3-0 SH 27" J316H

## (undated) DEVICE — TUBING IRRIG CYSTO/BLADDER SET 81" LF 2C4040

## (undated) DEVICE — SU SILK 3-0 TIE 12X30" A304H

## (undated) DEVICE — TAPE MICROPORE 2"X1.5YD 1530S-2

## (undated) DEVICE — CLIP HORIZON LG ORANGE 004200

## (undated) DEVICE — Device

## (undated) DEVICE — SPONGE RAY-TEC 4X8" 7318

## (undated) DEVICE — SPECIMEN CONTAINER 5OZ STERILE 2600SA

## (undated) DEVICE — PUNCH AORTIC 5MM SINGLE USE 1001-626

## (undated) DEVICE — BASIN SET SINGLE STERILE 13752-624

## (undated) DEVICE — DRAPE ISOLATION BAG 1003

## (undated) DEVICE — NDL ANGIOCATH 14GA 1.25" 4048

## (undated) DEVICE — WIPES FOLEY CARE SURESTEP PROVON DFC100

## (undated) DEVICE — PITCHER STERILE 1000ML  SSK9004A

## (undated) DEVICE — SU SILK 1 TIE 6X30" A307H

## (undated) DEVICE — LINEN TOWEL PACK X30 5481

## (undated) DEVICE — DRSG MEDIPORE 3 1/2X13 3/4" 3573

## (undated) DEVICE — SU PROLENE 6-0 RB-2DA 30" 8711H

## (undated) DEVICE — LINEN GOWN XLG 5407

## (undated) DEVICE — SU PDS II 4-0 RB-1 27" Z304H

## (undated) DEVICE — PREP CHLORAPREP 26ML TINTED ORANGE  260815

## (undated) DEVICE — SU PDS II 0 TP-1 60" Z991G

## (undated) DEVICE — DEVICE CATH STABILIZATION STATLOCK FOLEY 3-WAY FOL0105

## (undated) DEVICE — SU PDS II 5-0 RB-1 27" Z303H

## (undated) DEVICE — SU SILK 2-0 TIE 12X30" A305H

## (undated) DEVICE — BLADE CLIPPER SGL USE 9680

## (undated) DEVICE — DRAIN BLAKE 19FR W/TROCAR SIL

## (undated) DEVICE — DRSG DRAIN 2X2" 7087

## (undated) DEVICE — PREP SKIN SCRUB TRAY 4461A

## (undated) DEVICE — PREP POVIDONE IODINE SOLUTION 10% 120ML

## (undated) DEVICE — DRAPE MAYO STAND 23X54 8337

## (undated) DEVICE — CATH PLUG W/CAP 000076

## (undated) DEVICE — DRSG GAUZE 4X4" TRAY 6939

## (undated) DEVICE — DRAPE SLUSH/WARMER 66X44" ORS-320

## (undated) DEVICE — SU MONOCRYL 4-0 PS-2 27" UND Y426H

## (undated) DEVICE — INSERT FOGARTY 33MM TRACTION HYDRAJAW HYDRA33

## (undated) DEVICE — PAD CHUX UNDERPAD 23X24" 7136

## (undated) DEVICE — SU SILK 4-0 TIE 12X30" A303H

## (undated) DEVICE — JELLY LUBRICATING SURGILUBE 2OZ TUBE

## (undated) DEVICE — SOL NACL 0.9% INJ 1000ML BAG 07983-09

## (undated) DEVICE — SU PROLENE 5-0 RB-1DA 36"  8556H

## (undated) DEVICE — DRAIN JACKSON PRATT RESERVOIR 100ML SU130-1305

## (undated) DEVICE — SU DERMABOND ADVANCED .7ML DNX6

## (undated) DEVICE — LINEN TOWEL PACK X6 WHITE 5487

## (undated) DEVICE — DECANTER BAG 2002S

## (undated) DEVICE — SOL NACL 0.9% IRRIG 1000ML BOTTLE 2F7124

## (undated) RX ORDER — FUROSEMIDE 10 MG/ML
INJECTION INTRAMUSCULAR; INTRAVENOUS
Status: DISPENSED
Start: 2017-04-23

## (undated) RX ORDER — SODIUM CHLORIDE 9 MG/ML
INJECTION, SOLUTION INTRAVENOUS
Status: DISPENSED
Start: 2017-04-23

## (undated) RX ORDER — SODIUM CHLORIDE 450 MG/100ML
INJECTION, SOLUTION INTRAVENOUS
Status: DISPENSED
Start: 2017-04-23

## (undated) RX ORDER — HYDROMORPHONE HYDROCHLORIDE 1 MG/ML
INJECTION, SOLUTION INTRAMUSCULAR; INTRAVENOUS; SUBCUTANEOUS
Status: DISPENSED
Start: 2017-04-23

## (undated) RX ORDER — LEVOFLOXACIN 5 MG/ML
INJECTION, SOLUTION INTRAVENOUS
Status: DISPENSED
Start: 2017-05-15

## (undated) RX ORDER — FENTANYL CITRATE 50 UG/ML
INJECTION, SOLUTION INTRAMUSCULAR; INTRAVENOUS
Status: DISPENSED
Start: 2017-04-23